# Patient Record
Sex: MALE | Race: WHITE | NOT HISPANIC OR LATINO
[De-identification: names, ages, dates, MRNs, and addresses within clinical notes are randomized per-mention and may not be internally consistent; named-entity substitution may affect disease eponyms.]

---

## 2017-01-03 ENCOUNTER — RX RENEWAL (OUTPATIENT)
Age: 52
End: 2017-01-03

## 2017-01-09 ENCOUNTER — APPOINTMENT (OUTPATIENT)
Dept: FAMILY MEDICINE | Facility: CLINIC | Age: 52
End: 2017-01-09

## 2017-01-09 DIAGNOSIS — Z87.898 PERSONAL HISTORY OF OTHER SPECIFIED CONDITIONS: ICD-10-CM

## 2017-01-09 DIAGNOSIS — Z87.828 PERSONAL HISTORY OF OTHER (HEALED) PHYSICAL INJURY AND TRAUMA: ICD-10-CM

## 2017-01-18 VITALS — SYSTOLIC BLOOD PRESSURE: 130 MMHG | HEART RATE: 72 BPM | DIASTOLIC BLOOD PRESSURE: 72 MMHG

## 2017-03-07 ENCOUNTER — RX RENEWAL (OUTPATIENT)
Age: 52
End: 2017-03-07

## 2017-03-22 ENCOUNTER — RX RENEWAL (OUTPATIENT)
Age: 52
End: 2017-03-22

## 2017-03-31 ENCOUNTER — RX RENEWAL (OUTPATIENT)
Age: 52
End: 2017-03-31

## 2017-04-02 ENCOUNTER — RX RENEWAL (OUTPATIENT)
Age: 52
End: 2017-04-02

## 2017-06-21 ENCOUNTER — RX RENEWAL (OUTPATIENT)
Age: 52
End: 2017-06-21

## 2017-06-22 ENCOUNTER — RX RENEWAL (OUTPATIENT)
Age: 52
End: 2017-06-22

## 2017-07-25 ENCOUNTER — APPOINTMENT (OUTPATIENT)
Dept: FAMILY MEDICINE | Facility: CLINIC | Age: 52
End: 2017-07-25
Payer: MEDICARE

## 2017-07-25 VITALS
HEART RATE: 82 BPM | OXYGEN SATURATION: 96 % | BODY MASS INDEX: 27.77 KG/M2 | DIASTOLIC BLOOD PRESSURE: 70 MMHG | SYSTOLIC BLOOD PRESSURE: 120 MMHG | WEIGHT: 205 LBS | HEIGHT: 72 IN

## 2017-07-25 PROCEDURE — 36415 COLL VENOUS BLD VENIPUNCTURE: CPT

## 2017-07-25 PROCEDURE — 99214 OFFICE O/P EST MOD 30 MIN: CPT | Mod: 25

## 2017-07-25 RX ORDER — QUETIAPINE FUMARATE 25 MG/1
25 TABLET ORAL
Qty: 1 | Refills: 1 | Status: DISCONTINUED | COMMUNITY
Start: 2017-06-22 | End: 2017-07-25

## 2017-07-28 LAB
25(OH)D3 SERPL-MCNC: 25.8 NG/ML
ALBUMIN SERPL ELPH-MCNC: 4.6 G/DL
ALP BLD-CCNC: 67 U/L
ALT SERPL-CCNC: 16 U/L
ANION GAP SERPL CALC-SCNC: 16 MMOL/L
AST SERPL-CCNC: 16 U/L
BASOPHILS # BLD AUTO: 0.04 K/UL
BASOPHILS NFR BLD AUTO: 0.8 %
BILIRUB SERPL-MCNC: 0.2 MG/DL
BUN SERPL-MCNC: 14 MG/DL
CALCIUM SERPL-MCNC: 9.8 MG/DL
CHLORIDE SERPL-SCNC: 105 MMOL/L
CHOLEST SERPL-MCNC: 167 MG/DL
CHOLEST/HDLC SERPL: 4.4 RATIO
CO2 SERPL-SCNC: 25 MMOL/L
CREAT SERPL-MCNC: 0.8 MG/DL
EOSINOPHIL # BLD AUTO: 0.07 K/UL
EOSINOPHIL NFR BLD AUTO: 1.5 %
GLUCOSE SERPL-MCNC: 99 MG/DL
HBA1C MFR BLD HPLC: 5.4 %
HCT VFR BLD CALC: 45.6 %
HDLC SERPL-MCNC: 38 MG/DL
HGB BLD-MCNC: 14.7 G/DL
IMM GRANULOCYTES NFR BLD AUTO: 0 %
LDLC SERPL CALC-MCNC: 55 MG/DL
LYMPHOCYTES # BLD AUTO: 1.42 K/UL
LYMPHOCYTES NFR BLD AUTO: 30.1 %
MAN DIFF?: NORMAL
MCHC RBC-ENTMCNC: 28.7 PG
MCHC RBC-ENTMCNC: 32.2 GM/DL
MCV RBC AUTO: 89.1 FL
MONOCYTES # BLD AUTO: 0.45 K/UL
MONOCYTES NFR BLD AUTO: 9.6 %
NEUTROPHILS # BLD AUTO: 2.73 K/UL
NEUTROPHILS NFR BLD AUTO: 58 %
PLATELET # BLD AUTO: 205 K/UL
POTASSIUM SERPL-SCNC: 4.7 MMOL/L
PROT SERPL-MCNC: 6.6 G/DL
PSA SERPL-MCNC: 0.57 NG/ML
RBC # BLD: 5.12 M/UL
RBC # FLD: 14.5 %
SODIUM SERPL-SCNC: 146 MMOL/L
TRIGL SERPL-MCNC: 372 MG/DL
TSH SERPL-ACNC: 1.68 UIU/ML
WBC # FLD AUTO: 4.71 K/UL

## 2017-07-31 ENCOUNTER — RX RENEWAL (OUTPATIENT)
Age: 52
End: 2017-07-31

## 2017-11-21 ENCOUNTER — RX RENEWAL (OUTPATIENT)
Age: 52
End: 2017-11-21

## 2018-01-10 ENCOUNTER — RX RENEWAL (OUTPATIENT)
Age: 53
End: 2018-01-10

## 2018-02-13 ENCOUNTER — RX RENEWAL (OUTPATIENT)
Age: 53
End: 2018-02-13

## 2018-03-07 ENCOUNTER — APPOINTMENT (OUTPATIENT)
Dept: FAMILY MEDICINE | Facility: CLINIC | Age: 53
End: 2018-03-07

## 2018-03-12 ENCOUNTER — APPOINTMENT (OUTPATIENT)
Dept: FAMILY MEDICINE | Facility: CLINIC | Age: 53
End: 2018-03-12

## 2018-03-15 ENCOUNTER — APPOINTMENT (OUTPATIENT)
Dept: FAMILY MEDICINE | Facility: CLINIC | Age: 53
End: 2018-03-15

## 2018-06-18 ENCOUNTER — RX RENEWAL (OUTPATIENT)
Age: 53
End: 2018-06-18

## 2018-07-12 ENCOUNTER — RX RENEWAL (OUTPATIENT)
Age: 53
End: 2018-07-12

## 2018-08-02 ENCOUNTER — RX RENEWAL (OUTPATIENT)
Age: 53
End: 2018-08-02

## 2018-08-02 ENCOUNTER — MEDICATION RENEWAL (OUTPATIENT)
Age: 53
End: 2018-08-02

## 2018-08-07 ENCOUNTER — RX RENEWAL (OUTPATIENT)
Age: 53
End: 2018-08-07

## 2018-10-27 ENCOUNTER — RX RENEWAL (OUTPATIENT)
Age: 53
End: 2018-10-27

## 2018-11-02 ENCOUNTER — RX RENEWAL (OUTPATIENT)
Age: 53
End: 2018-11-02

## 2018-11-13 ENCOUNTER — RX RENEWAL (OUTPATIENT)
Age: 53
End: 2018-11-13

## 2018-12-05 ENCOUNTER — RX RENEWAL (OUTPATIENT)
Age: 53
End: 2018-12-05

## 2018-12-06 ENCOUNTER — APPOINTMENT (OUTPATIENT)
Dept: FAMILY MEDICINE | Facility: CLINIC | Age: 53
End: 2018-12-06
Payer: MEDICARE

## 2018-12-06 VITALS
HEIGHT: 72 IN | SYSTOLIC BLOOD PRESSURE: 130 MMHG | WEIGHT: 200 LBS | HEART RATE: 80 BPM | DIASTOLIC BLOOD PRESSURE: 86 MMHG | BODY MASS INDEX: 27.09 KG/M2

## 2018-12-06 DIAGNOSIS — Z23 ENCOUNTER FOR IMMUNIZATION: ICD-10-CM

## 2018-12-06 PROCEDURE — G0439: CPT

## 2018-12-06 PROCEDURE — 99213 OFFICE O/P EST LOW 20 MIN: CPT | Mod: 25

## 2018-12-06 PROCEDURE — 36415 COLL VENOUS BLD VENIPUNCTURE: CPT

## 2018-12-06 PROCEDURE — 90662 IIV NO PRSV INCREASED AG IM: CPT

## 2018-12-06 PROCEDURE — G0008: CPT

## 2018-12-08 NOTE — PHYSICAL EXAM
[General Appearance - Alert] : alert [General Appearance - In No Acute Distress] : in no acute distress [Sclera] : the sclera and conjunctiva were normal [Respiration, Rhythm And Depth] : normal respiratory rhythm and effort [Auscultation Breath Sounds / Voice Sounds] : lungs were clear to auscultation bilaterally [Heart Rate And Rhythm] : heart rate was normal and rhythm regular [Heart Sounds] : normal S1 and S2 [Abdomen Soft] : soft [Abdomen Tenderness] : non-tender [Supraclavicular Lymph Nodes Enlarged Bilaterally] : supraclavicular [No Spinal Tenderness] : no spinal tenderness [Involuntary Movements] : no involuntary movements were seen [Skin Turgor] : normal skin turgor [Oriented To Time, Place, And Person] : oriented to person, place, and time [FreeTextEntry1] : LE paraplegia  wearing braces

## 2018-12-08 NOTE — REVIEW OF SYSTEMS
[see HPI] : see HPI [Negative] : Gastrointestinal [Fever] : no fever [Chills] : no chills [Constipation] : no constipation [Diarrhea] : no diarrhea
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
## 2018-12-08 NOTE — HEALTH RISK ASSESSMENT
[0] : 1) Little interest or pleasure doing things: Not at all (0) [1] : 2) Feeling down, depressed, or hopeless for several days (1) [de-identified] : Denies [de-identified] : as in HPI  [de-identified] : Deejay [de-identified] : Denies [de-identified] : DENIES [[id number]] : 1
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
## 2018-12-10 ENCOUNTER — EMERGENCY (EMERGENCY)
Facility: HOSPITAL | Age: 53
LOS: 1 days | Discharge: HOME | End: 2018-12-10
Attending: EMERGENCY MEDICINE
Payer: MEDICARE

## 2018-12-10 VITALS
HEIGHT: 72 IN | HEART RATE: 68 BPM | SYSTOLIC BLOOD PRESSURE: 114 MMHG | DIASTOLIC BLOOD PRESSURE: 75 MMHG | TEMPERATURE: 97 F | WEIGHT: 199.96 LBS | RESPIRATION RATE: 17 BRPM | OXYGEN SATURATION: 97 %

## 2018-12-10 LAB
ALBUMIN SERPL ELPH-MCNC: 4 G/DL — SIGNIFICANT CHANGE UP (ref 3.3–5.2)
ALP SERPL-CCNC: 66 U/L — SIGNIFICANT CHANGE UP (ref 40–120)
ALT FLD-CCNC: 17 U/L — SIGNIFICANT CHANGE UP
AMPHET UR-MCNC: NEGATIVE — SIGNIFICANT CHANGE UP
ANION GAP SERPL CALC-SCNC: 13 MMOL/L — SIGNIFICANT CHANGE UP (ref 5–17)
APAP SERPL-MCNC: <7.5 UG/ML — LOW (ref 10–26)
APPEARANCE UR: CLEAR — SIGNIFICANT CHANGE UP
AST SERPL-CCNC: 17 U/L — SIGNIFICANT CHANGE UP
BACTERIA # UR AUTO: ABNORMAL
BARBITURATES UR SCN-MCNC: NEGATIVE — SIGNIFICANT CHANGE UP
BASOPHILS # BLD AUTO: 0 K/UL — SIGNIFICANT CHANGE UP (ref 0–0.2)
BASOPHILS NFR BLD AUTO: 0.4 % — SIGNIFICANT CHANGE UP (ref 0–2)
BENZODIAZ UR-MCNC: POSITIVE
BILIRUB SERPL-MCNC: 0.4 MG/DL — SIGNIFICANT CHANGE UP (ref 0.4–2)
BILIRUB UR-MCNC: NEGATIVE — SIGNIFICANT CHANGE UP
BUN SERPL-MCNC: 17 MG/DL — SIGNIFICANT CHANGE UP (ref 8–20)
CALCIUM SERPL-MCNC: 9.3 MG/DL — SIGNIFICANT CHANGE UP (ref 8.6–10.2)
CHLORIDE SERPL-SCNC: 103 MMOL/L — SIGNIFICANT CHANGE UP (ref 98–107)
CO2 SERPL-SCNC: 27 MMOL/L — SIGNIFICANT CHANGE UP (ref 22–29)
COCAINE METAB.OTHER UR-MCNC: NEGATIVE — SIGNIFICANT CHANGE UP
COLOR SPEC: YELLOW — SIGNIFICANT CHANGE UP
CREAT SERPL-MCNC: 0.71 MG/DL — SIGNIFICANT CHANGE UP (ref 0.5–1.3)
DIFF PNL FLD: ABNORMAL
EOSINOPHIL # BLD AUTO: 0.1 K/UL — SIGNIFICANT CHANGE UP (ref 0–0.5)
EOSINOPHIL NFR BLD AUTO: 1.2 % — SIGNIFICANT CHANGE UP (ref 0–5)
EPI CELLS # UR: SIGNIFICANT CHANGE UP
ETHANOL SERPL-MCNC: <10 MG/DL — SIGNIFICANT CHANGE UP
GLUCOSE SERPL-MCNC: 88 MG/DL — SIGNIFICANT CHANGE UP (ref 70–115)
GLUCOSE UR QL: NEGATIVE MG/DL — SIGNIFICANT CHANGE UP
HCT VFR BLD CALC: 42.1 % — SIGNIFICANT CHANGE UP (ref 42–52)
HGB BLD-MCNC: 13.7 G/DL — LOW (ref 14–18)
KETONES UR-MCNC: ABNORMAL
LEUKOCYTE ESTERASE UR-ACNC: ABNORMAL
LYMPHOCYTES # BLD AUTO: 1 K/UL — SIGNIFICANT CHANGE UP (ref 1–4.8)
LYMPHOCYTES # BLD AUTO: 11.6 % — LOW (ref 20–55)
MCHC RBC-ENTMCNC: 28.8 PG — SIGNIFICANT CHANGE UP (ref 27–31)
MCHC RBC-ENTMCNC: 32.5 G/DL — SIGNIFICANT CHANGE UP (ref 32–36)
MCV RBC AUTO: 88.6 FL — SIGNIFICANT CHANGE UP (ref 80–94)
METHADONE UR-MCNC: NEGATIVE — SIGNIFICANT CHANGE UP
MONOCYTES # BLD AUTO: 1.3 K/UL — HIGH (ref 0–0.8)
MONOCYTES NFR BLD AUTO: 15.7 % — HIGH (ref 3–10)
NEUTROPHILS # BLD AUTO: 5.8 K/UL — SIGNIFICANT CHANGE UP (ref 1.8–8)
NEUTROPHILS NFR BLD AUTO: 71 % — SIGNIFICANT CHANGE UP (ref 37–73)
NITRITE UR-MCNC: NEGATIVE — SIGNIFICANT CHANGE UP
OPIATES UR-MCNC: NEGATIVE — SIGNIFICANT CHANGE UP
PCP SPEC-MCNC: SIGNIFICANT CHANGE UP
PCP UR-MCNC: NEGATIVE — SIGNIFICANT CHANGE UP
PH UR: 6.5 — SIGNIFICANT CHANGE UP (ref 5–8)
PLATELET # BLD AUTO: 168 K/UL — SIGNIFICANT CHANGE UP (ref 150–400)
POTASSIUM SERPL-MCNC: 4 MMOL/L — SIGNIFICANT CHANGE UP (ref 3.5–5.3)
POTASSIUM SERPL-SCNC: 4 MMOL/L — SIGNIFICANT CHANGE UP (ref 3.5–5.3)
PROT SERPL-MCNC: 6.5 G/DL — LOW (ref 6.6–8.7)
PROT UR-MCNC: 100 MG/DL
RBC # BLD: 4.75 M/UL — SIGNIFICANT CHANGE UP (ref 4.6–6.2)
RBC # FLD: 14.4 % — SIGNIFICANT CHANGE UP (ref 11–15.6)
RBC CASTS # UR COMP ASSIST: ABNORMAL /HPF (ref 0–4)
SALICYLATES SERPL-MCNC: <0.6 MG/DL — LOW (ref 10–20)
SODIUM SERPL-SCNC: 143 MMOL/L — SIGNIFICANT CHANGE UP (ref 135–145)
SP GR SPEC: 1.02 — SIGNIFICANT CHANGE UP (ref 1.01–1.02)
THC UR QL: NEGATIVE — SIGNIFICANT CHANGE UP
TSH SERPL-MCNC: 1.55 UIU/ML — SIGNIFICANT CHANGE UP (ref 0.27–4.2)
UROBILINOGEN FLD QL: 1 MG/DL
WBC # BLD: 8.2 K/UL — SIGNIFICANT CHANGE UP (ref 4.8–10.8)
WBC # FLD AUTO: 8.2 K/UL — SIGNIFICANT CHANGE UP (ref 4.8–10.8)
WBC UR QL: SIGNIFICANT CHANGE UP

## 2018-12-10 PROCEDURE — 70450 CT HEAD/BRAIN W/O DYE: CPT | Mod: 26

## 2018-12-10 PROCEDURE — 71045 X-RAY EXAM CHEST 1 VIEW: CPT | Mod: 26

## 2018-12-10 PROCEDURE — 99285 EMERGENCY DEPT VISIT HI MDM: CPT

## 2018-12-10 PROCEDURE — 93010 ELECTROCARDIOGRAM REPORT: CPT

## 2018-12-10 RX ORDER — NALOXONE HYDROCHLORIDE 4 MG/.1ML
0.4 SPRAY NASAL ONCE
Refills: 0 | Status: COMPLETED | OUTPATIENT
Start: 2018-12-10 | End: 2018-12-10

## 2018-12-10 RX ADMIN — NALOXONE HYDROCHLORIDE 0.4 MILLIGRAM(S): 4 SPRAY NASAL at 15:47

## 2018-12-10 RX ADMIN — NALOXONE HYDROCHLORIDE 0.4 MILLIGRAM(S): 4 SPRAY NASAL at 14:55

## 2018-12-10 NOTE — ED ADULT NURSE NOTE - NSIMPLEMENTINTERV_GEN_ALL_ED
Implemented All Fall with Harm Risk Interventions:  [place] to call system. Call bell, personal items and telephone within reach. Instruct patient to call for assistance. Room bathroom lighting operational. Non-slip footwear when patient is off stretcher. Physically safe environment: no spills, clutter or unnecessary equipment. Stretcher in lowest position, wheels locked, appropriate side rails in place. Provide visual cue, wrist band, yellow gown, etc. Monitor gait and stability. Monitor for mental status changes and reorient to person, place, and time. Review medications for side effects contributing to fall risk. Reinforce activity limits and safety measures with patient and family. Provide visual clues: red socks.

## 2018-12-10 NOTE — ED PROVIDER NOTE - PROGRESS NOTE DETAILS
Cora: pt is considerably more alert. Pt able to provide Hx- stating he took 2 benzo pills and nothing else. Denies feeling depressed or that it was a suicide attempt. Pt still somewhat somnolent and awaiting further sobriety MONIKA: I evaluated the patient. He is comfortable and in no distress. At his mental status baseline, awake, alert, no slurred speech. Ambulette coming to get him at noon.
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
## 2018-12-10 NOTE — ED ADULT NURSE REASSESSMENT NOTE - NS ED NURSE REASSESS COMMENT FT1
Pt. received at 1930. resting in stretcher in yellow gown in cardiac monitor. VSS. oriented to person, place, and time. Requesting food, respirations even and unlabored. informed of plan of care. will continue to monitor and maintain safety.
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
## 2018-12-10 NOTE — ED PROVIDER NOTE - NEUROLOGICAL, MLM
moves all extremities equally, localizes painful stimuli, opens eyes to painful stimuli, incomprehensible sounds
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
## 2018-12-11 VITALS
SYSTOLIC BLOOD PRESSURE: 119 MMHG | OXYGEN SATURATION: 97 % | HEART RATE: 99 BPM | RESPIRATION RATE: 18 BRPM | DIASTOLIC BLOOD PRESSURE: 83 MMHG | TEMPERATURE: 98 F

## 2018-12-11 NOTE — CHART NOTE - NSCHARTNOTEFT_GEN_A_CORE
SW Note - per Dr Sigala called the home AID listed in chart for pt Tesfaye Vargas [phone] and AID states she is not in a position to be able to return to the hospital tonight. She was told earlier "pt will be kept overnight for observation" Ms Vargas states she is able to come in the AM and transport pt home but in unable to transport pt tonight. Communicated same to Dr Sigala.
Spoke to Oscar from [facility] agency. Pt's transport is not available until noon.  will call back and may need details as to why patient came to ER. Relayed to agency, release form must be signed to further discuss patients status.

## 2018-12-11 NOTE — PROVIDER CONTACT NOTE (OTHER) - ASSESSMENT
CM determined that pt has 12h/7d of aide services through [facility] and [facility].  Aide in place for 12:30.  [facility] arranged for 12:30 p/u. Floor RN, MD, Pt, and ANGELICAO aware of same

## 2018-12-11 NOTE — ED ADULT NURSE REASSESSMENT NOTE - NS ED NURSE REASSESS COMMENT FT1
Patient recd this carolyne, ALISE/Yvonne3, VSS, waiting to go back to group home, call put out to group home.

## 2018-12-11 NOTE — ED ADULT NURSE REASSESSMENT NOTE - NS ED NURSE REASSESS COMMENT FT1
Pt. sleeping but awakens to voice. offers no complaints at this time. no aide at group home for patient. will arrange for transport in AM. pt. informed of plan of care. no further questions at this time. will continue to monitor and maintain safety.

## 2018-12-13 LAB
ALBUMIN SERPL ELPH-MCNC: 4.5 G/DL
ALP BLD-CCNC: 83 U/L
ALT SERPL-CCNC: 35 U/L
ANION GAP SERPL CALC-SCNC: 14 MMOL/L
AST SERPL-CCNC: 11 U/L
BASOPHILS # BLD AUTO: 0.07 K/UL
BASOPHILS NFR BLD AUTO: 1 %
BILIRUB SERPL-MCNC: 0.2 MG/DL
BUN SERPL-MCNC: 17 MG/DL
CALCIUM SERPL-MCNC: 9.4 MG/DL
CHLORIDE SERPL-SCNC: 109 MMOL/L
CHOLEST SERPL-MCNC: 153 MG/DL
CHOLEST/HDLC SERPL: 3.6 RATIO
CO2 SERPL-SCNC: 23 MMOL/L
CREAT SERPL-MCNC: 0.71 MG/DL
EOSINOPHIL # BLD AUTO: 0.1 K/UL
EOSINOPHIL NFR BLD AUTO: 1.4 %
GLUCOSE SERPL-MCNC: 108 MG/DL
HBA1C MFR BLD HPLC: 5.6 %
HCT VFR BLD CALC: 45.6 %
HDLC SERPL-MCNC: 43 MG/DL
HGB BLD-MCNC: 15 G/DL
IMM GRANULOCYTES NFR BLD AUTO: 0.1 %
LDLC SERPL CALC-MCNC: NORMAL
LYMPHOCYTES # BLD AUTO: 1.29 K/UL
LYMPHOCYTES NFR BLD AUTO: 18 %
MAN DIFF?: NORMAL
MCHC RBC-ENTMCNC: 29.5 PG
MCHC RBC-ENTMCNC: 32.9 GM/DL
MCV RBC AUTO: 89.6 FL
MONOCYTES # BLD AUTO: 0.54 K/UL
MONOCYTES NFR BLD AUTO: 7.6 %
NEUTROPHILS # BLD AUTO: 5.14 K/UL
NEUTROPHILS NFR BLD AUTO: 71.9 %
PLATELET # BLD AUTO: 190 K/UL
POTASSIUM SERPL-SCNC: 4 MMOL/L
PROT SERPL-MCNC: 6.2 G/DL
PSA SERPL-MCNC: 0.7 NG/ML
RBC # BLD: 5.09 M/UL
RBC # FLD: 15 %
SODIUM SERPL-SCNC: 146 MMOL/L
TRIGL SERPL-MCNC: 562 MG/DL
WBC # FLD AUTO: 7.15 K/UL

## 2018-12-18 LAB — HEMOCCULT STL QL IA: NEGATIVE

## 2018-12-31 ENCOUNTER — RX RENEWAL (OUTPATIENT)
Age: 53
End: 2018-12-31

## 2019-01-27 ENCOUNTER — RX RENEWAL (OUTPATIENT)
Age: 54
End: 2019-01-27

## 2019-01-30 ENCOUNTER — RX RENEWAL (OUTPATIENT)
Age: 54
End: 2019-01-30

## 2019-02-15 ENCOUNTER — OTHER (OUTPATIENT)
Age: 54
End: 2019-02-15

## 2019-02-19 ENCOUNTER — APPOINTMENT (OUTPATIENT)
Dept: ORTHOPEDIC SURGERY | Facility: CLINIC | Age: 54
End: 2019-02-19

## 2019-02-26 ENCOUNTER — RX RENEWAL (OUTPATIENT)
Age: 54
End: 2019-02-26

## 2019-02-26 ENCOUNTER — APPOINTMENT (OUTPATIENT)
Dept: ORTHOPEDIC SURGERY | Facility: CLINIC | Age: 54
End: 2019-02-26
Payer: MEDICARE

## 2019-02-26 VITALS
DIASTOLIC BLOOD PRESSURE: 78 MMHG | HEIGHT: 72 IN | HEART RATE: 76 BPM | WEIGHT: 200 LBS | BODY MASS INDEX: 27.09 KG/M2 | SYSTOLIC BLOOD PRESSURE: 132 MMHG

## 2019-02-26 DIAGNOSIS — T14.8XXA OTHER INJURY OF UNSPECIFIED BODY REGION, INITIAL ENCOUNTER: ICD-10-CM

## 2019-02-26 DIAGNOSIS — L08.9 OTHER INJURY OF UNSPECIFIED BODY REGION, INITIAL ENCOUNTER: ICD-10-CM

## 2019-02-26 DIAGNOSIS — Z87.2 PERSONAL HISTORY OF DISEASES OF THE SKIN AND SUBCUTANEOUS TISSUE: ICD-10-CM

## 2019-02-26 PROCEDURE — 99205 OFFICE O/P NEW HI 60 MIN: CPT

## 2019-02-26 PROCEDURE — 72100 X-RAY EXAM L-S SPINE 2/3 VWS: CPT
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
## 2019-02-26 NOTE — DISCUSSION/SUMMARY
[de-identified] : Pt is already under the care of Dr. Les London. As seeing Dr. London is a physiatrist, I would recommend following up with Dr. London for further evaluation. If there is some surgical focus vs back pain, I would be more than happy to see this patient back for a surgical discussion. I believe this patient is a complex spine patient at this point in time given his history of paraplegia wound problems etc. patient is complaining of chronic back pain and I do not think operative management is in this patient's best interest at this point in time the above-mentioned physiatry he will be contacted with regard to this patient with regard to optimal management of his back pain. I would recommend epidural type injections less pain management techniques.

## 2019-02-26 NOTE — ADDENDUM
[FreeTextEntry1] : Documented by Kaz Onofre acting as a scribe for Dr. Raffy Arreguin on 02/26/2019 . All medical record entries made by the Scribe were at my, Dr. Raffy Arreguin, direction and personally dictated by me on 02/26/2019 . I have reviewed the chart and agree that the record accurately reflects my personal performance of the history, physical exam, assessment and plan. I have also personally directed, reviewed, and agreed with the chart.

## 2019-02-26 NOTE — PHYSICAL EXAM
[Poor Appearance] : well-appearing [Acute Distress] : not in acute distress [de-identified] : The patient appears well nourished and in no apparent distress. The patient is alert and oriented to person, place, and time. Affect and mood appear normal. The head is normocephalic and atraumatic. The eyes reveal normal sclera and extra ocular muscles are intact. The tongue is midline with no apparent lesions. Skin shows normal turgor with no evidence of eczema or psoriasis. No respiratory distress noted. \par \par seated in a motorized wheelchair. he has b/l AFO's in place. he is paralyzed essentially from waist down. some UE diminished motor tone. [de-identified] : X-ray of the lumbar spine taken today shows lumbar spondylosis.

## 2019-02-28 ENCOUNTER — RX RENEWAL (OUTPATIENT)
Age: 54
End: 2019-02-28

## 2019-04-23 ENCOUNTER — RX RENEWAL (OUTPATIENT)
Age: 54
End: 2019-04-23

## 2019-04-30 ENCOUNTER — RX RENEWAL (OUTPATIENT)
Age: 54
End: 2019-04-30

## 2019-05-14 ENCOUNTER — RX RENEWAL (OUTPATIENT)
Age: 54
End: 2019-05-14

## 2019-05-25 ENCOUNTER — RX RENEWAL (OUTPATIENT)
Age: 54
End: 2019-05-25

## 2019-07-02 ENCOUNTER — RX RENEWAL (OUTPATIENT)
Age: 54
End: 2019-07-02

## 2019-08-26 ENCOUNTER — RX RENEWAL (OUTPATIENT)
Age: 54
End: 2019-08-26

## 2019-09-24 ENCOUNTER — RX RENEWAL (OUTPATIENT)
Age: 54
End: 2019-09-24

## 2019-10-01 ENCOUNTER — RX RENEWAL (OUTPATIENT)
Age: 54
End: 2019-10-01

## 2019-10-24 ENCOUNTER — RX RENEWAL (OUTPATIENT)
Age: 54
End: 2019-10-24

## 2020-01-02 ENCOUNTER — RX RENEWAL (OUTPATIENT)
Age: 55
End: 2020-01-02

## 2020-01-24 ENCOUNTER — RX RENEWAL (OUTPATIENT)
Age: 55
End: 2020-01-24

## 2020-02-06 ENCOUNTER — RX RENEWAL (OUTPATIENT)
Age: 55
End: 2020-02-06

## 2020-02-13 ENCOUNTER — RX RENEWAL (OUTPATIENT)
Age: 55
End: 2020-02-13

## 2020-02-28 ENCOUNTER — RX RENEWAL (OUTPATIENT)
Age: 55
End: 2020-02-28

## 2020-03-27 ENCOUNTER — RX RENEWAL (OUTPATIENT)
Age: 55
End: 2020-03-27

## 2020-04-23 ENCOUNTER — RX RENEWAL (OUTPATIENT)
Age: 55
End: 2020-04-23

## 2020-06-04 ENCOUNTER — RX RENEWAL (OUTPATIENT)
Age: 55
End: 2020-06-04

## 2020-06-08 ENCOUNTER — APPOINTMENT (OUTPATIENT)
Dept: PULMONOLOGY | Facility: CLINIC | Age: 55
End: 2020-06-08
Payer: MEDICARE

## 2020-06-08 VITALS — BODY MASS INDEX: 26.45 KG/M2 | WEIGHT: 195 LBS

## 2020-06-08 PROCEDURE — 99214 OFFICE O/P EST MOD 30 MIN: CPT

## 2020-06-08 RX ORDER — METHYLPREDNISOLONE 4 MG/1
4 TABLET ORAL
Qty: 21 | Refills: 0 | Status: DISCONTINUED | COMMUNITY
Start: 2020-02-06 | End: 2020-06-08

## 2020-06-08 RX ORDER — ACETAMINOPHEN 500 MG/1
500 TABLET ORAL
Qty: 100 | Refills: 4 | Status: DISCONTINUED | COMMUNITY
Start: 2017-04-02 | End: 2020-06-08

## 2020-06-10 NOTE — PHYSICAL EXAM
[No Acute Distress] : no acute distress [Normal Oropharynx] : normal oropharynx [Normal Appearance] : normal appearance [No Neck Mass] : no neck mass [Normal Rate/Rhythm] : normal rate/rhythm [Normal S1, S2] : normal s1, s2 [No Murmurs] : no murmurs [No Resp Distress] : no resp distress [Clear to Auscultation Bilaterally] : clear to auscultation bilaterally [No Abnormalities] : no abnormalities [Benign] : benign [Normal Gait] : normal gait [No Clubbing] : no clubbing [No Cyanosis] : no cyanosis [No Edema] : no edema [FROM] : FROM [Normal Color/ Pigmentation] : normal color/ pigmentation [Oriented x3] : oriented x3 [Normal Affect] : normal affect [TextBox_2] : confined to a wheelchair [TextBox_132] : Bilateral lower extremity paralysis
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
## 2020-06-10 NOTE — DISCUSSION/SUMMARY
[FreeTextEntry1] : 54-year-old male, seen today for reevaluation of his sleep apnea. Patient states that his CPAP machine is only 2 years old and therefore, will require a repair attempts before placement. Previous studies from [facility]. Will be obtained.

## 2020-06-15 ENCOUNTER — RX RENEWAL (OUTPATIENT)
Age: 55
End: 2020-06-15

## 2020-06-22 ENCOUNTER — RX RENEWAL (OUTPATIENT)
Age: 55
End: 2020-06-22

## 2020-07-09 DIAGNOSIS — Z01.818 ENCOUNTER FOR OTHER PREPROCEDURAL EXAMINATION: ICD-10-CM

## 2020-07-12 ENCOUNTER — APPOINTMENT (OUTPATIENT)
Dept: DISASTER EMERGENCY | Facility: CLINIC | Age: 55
End: 2020-07-12

## 2020-07-15 ENCOUNTER — APPOINTMENT (OUTPATIENT)
Dept: PULMONOLOGY | Facility: CLINIC | Age: 55
End: 2020-07-15
Payer: MEDICARE

## 2020-07-15 VITALS — HEART RATE: 81 BPM | OXYGEN SATURATION: 94 % | RESPIRATION RATE: 16 BRPM

## 2020-07-15 VITALS — WEIGHT: 215 LBS | HEIGHT: 72 IN | BODY MASS INDEX: 29.12 KG/M2

## 2020-07-15 PROCEDURE — 99213 OFFICE O/P EST LOW 20 MIN: CPT

## 2020-07-15 RX ORDER — VITAMIN B COMPLEX
TABLET ORAL
Qty: 100 | Refills: 4 | Status: COMPLETED | COMMUNITY
Start: 2017-03-07 | End: 2020-07-15

## 2020-07-15 NOTE — HISTORY OF PRESENT ILLNESS
[TextBox_4] : 55-year-old male, seen today status post ORIF for a new BiPAP machine. Patient did not receive his machine. He has no complaints of cough, wheeze, or sputum. He is complaining of excessive daytime somnolence

## 2020-07-15 NOTE — DISCUSSION/SUMMARY
[FreeTextEntry1] : 54-year-old male with a history of excessive daytime somnolence previously maintained on BiPAP seen today for expected review on new BiPAP. Patient presently has not received a machine and information was given to him regarding contact with his DME. Next followup will be the TeleHealth

## 2020-07-15 NOTE — PHYSICAL EXAM
[Normal Appearance] : normal appearance [No Acute Distress] : no acute distress [Normal Oropharynx] : normal oropharynx [Normal Rate/Rhythm] : normal rate/rhythm [Normal S1, S2] : normal s1, s2 [No Neck Mass] : no neck mass [Clear to Auscultation Bilaterally] : clear to auscultation bilaterally [No Murmurs] : no murmurs [No Resp Distress] : no resp distress [No Abnormalities] : no abnormalities [Benign] : benign [No Clubbing] : no clubbing [No Cyanosis] : no cyanosis [Normal Gait] : normal gait [No Edema] : no edema [Normal Color/ Pigmentation] : normal color/ pigmentation [FROM] : FROM [Oriented x3] : oriented x3 [Normal Affect] : normal affect [TextBox_2] : confined to a wheelchair [TextBox_132] : Bilateral lower extremity paralysis

## 2020-08-24 ENCOUNTER — APPOINTMENT (OUTPATIENT)
Dept: PULMONOLOGY | Facility: CLINIC | Age: 55
End: 2020-08-24
Payer: MEDICARE

## 2020-08-24 DIAGNOSIS — G47.9 SLEEP DISORDER, UNSPECIFIED: ICD-10-CM

## 2020-08-24 PROCEDURE — 99213 OFFICE O/P EST LOW 20 MIN: CPT | Mod: 95

## 2020-08-25 NOTE — HISTORY OF PRESENT ILLNESS
[Home] : at home, [unfilled] , at the time of the visit. [Medical Office: ([facility])___] : at the medical office located in  [Verbal consent obtained from patient] : the patient, [unfilled] [Obstructive Sleep Apnea] : obstructive sleep apnea [BPAP:] : BPAP [Lab] : lab [TextBox_4] : Telehealth via [facility] [TextBox_108] : 6.6 [TextBox_100] : 9/30/16 [TextBox_135] : 15/11 [TextBox_120] : split night study [TextBox_127] : 8/6/20 [TextBox_129] : 8/23/20 [TextBox_133] : 72 [TextBox_137] : 56 [TextBox_141] : 7 [TextBox_147] : 6.8 [TextBox_143] : 2 [TextBox_158] : [employer] [TextBox_160] : F38 [TextBox_165] : Did not receive heated hose which is affecting compliance \par  [TextBox_162] : 6/8/20

## 2020-08-25 NOTE — PHYSICAL EXAM
[No Acute Distress] : no acute distress [Well Nourished] : well nourished [Well Groomed] : well groomed [Normal Appearance] : normal appearance [No Deformities] : no deformities [Well Developed] : well developed [TextBox_2] : In wheelchair

## 2020-08-25 NOTE — DISCUSSION/SUMMARY
[FreeTextEntry1] : The patient's obstructive sleep apnea has always been mild and despite the use of BiPAP, which as been compromised by his equipment is appendectomy. Hypoxia. Next has not significantly changed. He remains just above normal. His complaints of unrefreshing sleep, and daytime somnolence may be secondary to traumatic brain injury, depression, as well as medication

## 2020-09-01 ENCOUNTER — EMERGENCY (EMERGENCY)
Facility: HOSPITAL | Age: 55
LOS: 1 days | Discharge: HOME | End: 2020-09-01
Attending: STUDENT IN AN ORGANIZED HEALTH CARE EDUCATION/TRAINING PROGRAM
Payer: MEDICARE

## 2020-09-01 VITALS
WEIGHT: 214.95 LBS | TEMPERATURE: 98 F | RESPIRATION RATE: 16 BRPM | HEART RATE: 98 BPM | DIASTOLIC BLOOD PRESSURE: 81 MMHG | SYSTOLIC BLOOD PRESSURE: 133 MMHG | HEIGHT: 72 IN | OXYGEN SATURATION: 97 %

## 2020-09-01 PROCEDURE — 99284 EMERGENCY DEPT VISIT MOD MDM: CPT

## 2020-09-01 PROCEDURE — 99283 EMERGENCY DEPT VISIT LOW MDM: CPT

## 2020-09-01 PROCEDURE — 73564 X-RAY EXAM KNEE 4 OR MORE: CPT

## 2020-09-01 PROCEDURE — 73564 X-RAY EXAM KNEE 4 OR MORE: CPT | Mod: 26,LT

## 2020-09-01 RX ORDER — OXYCODONE AND ACETAMINOPHEN 5; 325 MG/1; MG/1
1 TABLET ORAL ONCE
Refills: 0 | Status: DISCONTINUED | OUTPATIENT
Start: 2020-09-01 | End: 2020-09-01

## 2020-09-01 RX ORDER — IBUPROFEN 200 MG
600 TABLET ORAL ONCE
Refills: 0 | Status: COMPLETED | OUTPATIENT
Start: 2020-09-01 | End: 2020-09-01

## 2020-09-01 RX ADMIN — OXYCODONE AND ACETAMINOPHEN 1 TABLET(S): 5; 325 TABLET ORAL at 14:33

## 2020-09-01 RX ADMIN — Medication 600 MILLIGRAM(S): at 17:11

## 2020-09-01 NOTE — ED PROVIDER NOTE - PROGRESS NOTE DETAILS
advised on prelim results of xray and highly arthritic appearing knee. advised on RICE, and fu with orthopedic for continued pain

## 2020-09-01 NOTE — ED ADULT TRIAGE NOTE - CHIEF COMPLAINT QUOTE
Patient A&Ox4 complaining of pain to left knee secondary to slip & fall out of wheelchair last night. Stated took 1000mg Tylenol x4 hours ago. Presents with +4 pitting edema to bilat feet, chronic in nature.

## 2020-09-01 NOTE — ED PROVIDER NOTE - CLINICAL SUMMARY MEDICAL DECISION MAKING FREE TEXT BOX
male wheelchair bound with mechanical fall last evening transferring. c/o left knee pain. no apparent deformity. pms intact. xray pain control and re-eval

## 2020-09-01 NOTE — ED PROVIDER NOTE - ATTENDING CONTRIBUTION TO CARE
54 YOM pmh TBI wheelchair bound was transferring from chair to to bed, did not have LOC. Used his life alert device and assistance helped him back into his bed. Went to sleep and this morning upon waking was complaining of left knee pain which prompted him to come to ED. Does not take blood thinners. Did not hit head.   AP - will get xray knee r/o traumatic injury. offered pain control.

## 2020-09-01 NOTE — ED PROVIDER NOTE - PATIENT PORTAL LINK FT
You can access the FollowMyHealth Patient Portal offered by [facility] by registering at the following website: [email or web address]. By joining [employer]’s FollowMyHealth portal, you will also be able to view your health information using other applications (apps) compatible with our system.

## 2020-09-01 NOTE — ED PROVIDER NOTE - CARE PROVIDER_API CALL
David Yoder  ORTHOPAEDIC SURGERY  [address] [place] [address]  Phone: [phone]  Fax: [phone]  Follow Up Time: 7-10 Days

## 2020-09-01 NOTE — ED PROVIDER NOTE - CONSTITUTIONAL, MLM
normal... Well appearing, awake, alert, oriented to person, place, time/situation and in no apparent distress. NCAT

## 2020-09-01 NOTE — ED PROVIDER NOTE - OBJECTIVE STATEMENT
53 yo male hx of tbi wheelchair bound was transferring from chair to bed late last night and fell. denies head injury or loc no blood thinners. states that he used his life alert device for assistance. denies neck or back pain. denies chest pain sob. c/o left knee pain worse with movements took 1000mg tylenol this morning without relief of symptoms. denies numbness or tingling of the lower extremity denies new bladder or bowel incontinence 53 yo male hx of tbi wheelchair bound was transferring from chair to bed late last night and fell. denies head injury or loc no blood thinners. states that he used his life alert device for assistance. denies neck or back pain. denies chest pain sob. c/o left knee pain worse with movements took 1000mg tylenol this morning without relief of symptoms. denies numbness or tingling of the lower extremity denies new bladder or bowel incontinence.

## 2020-09-01 NOTE — ED PROVIDER NOTE - MUSCULOSKELETAL, MLM
Spine appears normal, no midline pt vertebral or step offs. bilaterally 4+ pedal edema bilaterally. 2+ dp pulses sensation intacvt bilaterally. bilateral calfs soft no calf tenderness. KNEE without noted deformity. lateral and medial patella tenderness. no effusion. full flexion and extension

## 2020-09-01 NOTE — CHART NOTE - NSCHARTNOTEFT_GEN_A_CORE
SOCIAL WORK NOTE:  MET WITH PATIENT AS PER MD NEEDS AMBULANCE BACK TO PRIVATE HOME.  PATIENT IS ALERT AND ORIENTED. CONFIRMED HE HAS KEYS TO ENTER HIS APARTMENT AND HIS AID WILL BE THERE UNTIL 6 PM.  PATIENT DOES NOT HAVE HIS WHEELCHAIR HERE WITH HIM AND IS NON AMBULATORY.  AMBULANCE ARRANGED FOR 3;30 PM PICKUP TO HOME.

## 2020-09-08 ENCOUNTER — RX RENEWAL (OUTPATIENT)
Age: 55
End: 2020-09-08

## 2020-09-18 ENCOUNTER — APPOINTMENT (OUTPATIENT)
Dept: FAMILY MEDICINE | Facility: CLINIC | Age: 55
End: 2020-09-18
Payer: MEDICARE

## 2020-09-18 ENCOUNTER — EMERGENCY (EMERGENCY)
Facility: HOSPITAL | Age: 55
LOS: 1 days | Discharge: HOME | End: 2020-09-18
Attending: EMERGENCY MEDICINE
Payer: MEDICARE

## 2020-09-18 VITALS
WEIGHT: 215 LBS | SYSTOLIC BLOOD PRESSURE: 110 MMHG | BODY MASS INDEX: 29.12 KG/M2 | HEIGHT: 72 IN | HEART RATE: 78 BPM | DIASTOLIC BLOOD PRESSURE: 78 MMHG

## 2020-09-18 VITALS
RESPIRATION RATE: 16 BRPM | DIASTOLIC BLOOD PRESSURE: 94 MMHG | HEART RATE: 73 BPM | WEIGHT: 214.95 LBS | TEMPERATURE: 98 F | HEIGHT: 72 IN | SYSTOLIC BLOOD PRESSURE: 144 MMHG | OXYGEN SATURATION: 96 %

## 2020-09-18 LAB
ALBUMIN SERPL ELPH-MCNC: 4.2 G/DL — SIGNIFICANT CHANGE UP (ref 3.3–5.2)
ALP SERPL-CCNC: 92 U/L — SIGNIFICANT CHANGE UP (ref 40–120)
ALT FLD-CCNC: 21 U/L — SIGNIFICANT CHANGE UP
ANION GAP SERPL CALC-SCNC: 11 MMOL/L — SIGNIFICANT CHANGE UP (ref 5–17)
APPEARANCE UR: CLEAR — SIGNIFICANT CHANGE UP
AST SERPL-CCNC: 31 U/L — SIGNIFICANT CHANGE UP
BASOPHILS # BLD AUTO: 0.08 K/UL — SIGNIFICANT CHANGE UP (ref 0–0.2)
BASOPHILS NFR BLD AUTO: 1 % — SIGNIFICANT CHANGE UP (ref 0–2)
BILIRUB SERPL-MCNC: 0.2 MG/DL — LOW (ref 0.4–2)
BILIRUB UR-MCNC: NEGATIVE — SIGNIFICANT CHANGE UP
BUN SERPL-MCNC: 17 MG/DL — SIGNIFICANT CHANGE UP (ref 8–20)
CALCIUM SERPL-MCNC: 9.8 MG/DL — SIGNIFICANT CHANGE UP (ref 8.6–10.2)
CHLORIDE SERPL-SCNC: 102 MMOL/L — SIGNIFICANT CHANGE UP (ref 98–107)
CO2 SERPL-SCNC: 26 MMOL/L — SIGNIFICANT CHANGE UP (ref 22–29)
COLOR SPEC: YELLOW — SIGNIFICANT CHANGE UP
CREAT SERPL-MCNC: 0.68 MG/DL — SIGNIFICANT CHANGE UP (ref 0.5–1.3)
DIFF PNL FLD: NEGATIVE — SIGNIFICANT CHANGE UP
EOSINOPHIL # BLD AUTO: 0.18 K/UL — SIGNIFICANT CHANGE UP (ref 0–0.5)
EOSINOPHIL NFR BLD AUTO: 2.3 % — SIGNIFICANT CHANGE UP (ref 0–6)
GLUCOSE SERPL-MCNC: 107 MG/DL — HIGH (ref 70–99)
GLUCOSE UR QL: NEGATIVE MG/DL — SIGNIFICANT CHANGE UP
HCT VFR BLD CALC: 47.2 % — SIGNIFICANT CHANGE UP (ref 39–50)
HGB BLD-MCNC: 15.3 G/DL — SIGNIFICANT CHANGE UP (ref 13–17)
IMM GRANULOCYTES NFR BLD AUTO: 0.4 % — SIGNIFICANT CHANGE UP (ref 0–1.5)
KETONES UR-MCNC: NEGATIVE — SIGNIFICANT CHANGE UP
LEUKOCYTE ESTERASE UR-ACNC: NEGATIVE — SIGNIFICANT CHANGE UP
LIDOCAIN IGE QN: 31 U/L — SIGNIFICANT CHANGE UP (ref 22–51)
LYMPHOCYTES # BLD AUTO: 1.6 K/UL — SIGNIFICANT CHANGE UP (ref 1–3.3)
LYMPHOCYTES # BLD AUTO: 20.6 % — SIGNIFICANT CHANGE UP (ref 13–44)
MAGNESIUM SERPL-MCNC: 2 MG/DL — SIGNIFICANT CHANGE UP (ref 1.6–2.6)
MCHC RBC-ENTMCNC: 29.5 PG — SIGNIFICANT CHANGE UP (ref 27–34)
MCHC RBC-ENTMCNC: 32.4 GM/DL — SIGNIFICANT CHANGE UP (ref 32–36)
MCV RBC AUTO: 91.1 FL — SIGNIFICANT CHANGE UP (ref 80–100)
MONOCYTES # BLD AUTO: 0.94 K/UL — HIGH (ref 0–0.9)
MONOCYTES NFR BLD AUTO: 12.1 % — SIGNIFICANT CHANGE UP (ref 2–14)
NEUTROPHILS # BLD AUTO: 4.92 K/UL — SIGNIFICANT CHANGE UP (ref 1.8–7.4)
NEUTROPHILS NFR BLD AUTO: 63.6 % — SIGNIFICANT CHANGE UP (ref 43–77)
NITRITE UR-MCNC: NEGATIVE — SIGNIFICANT CHANGE UP
NT-PROBNP SERPL-SCNC: 10 PG/ML — SIGNIFICANT CHANGE UP (ref 0–300)
PH UR: 7 — SIGNIFICANT CHANGE UP (ref 5–8)
PLATELET # BLD AUTO: 189 K/UL — SIGNIFICANT CHANGE UP (ref 150–400)
POTASSIUM SERPL-MCNC: 4.5 MMOL/L — SIGNIFICANT CHANGE UP (ref 3.5–5.3)
POTASSIUM SERPL-SCNC: 4.5 MMOL/L — SIGNIFICANT CHANGE UP (ref 3.5–5.3)
PROT SERPL-MCNC: 6.7 G/DL — SIGNIFICANT CHANGE UP (ref 6.6–8.7)
PROT UR-MCNC: 15 MG/DL
RBC # BLD: 5.18 M/UL — SIGNIFICANT CHANGE UP (ref 4.2–5.8)
RBC # FLD: 14.3 % — SIGNIFICANT CHANGE UP (ref 10.3–14.5)
SODIUM SERPL-SCNC: 139 MMOL/L — SIGNIFICANT CHANGE UP (ref 135–145)
SP GR SPEC: 1.01 — SIGNIFICANT CHANGE UP (ref 1.01–1.02)
TROPONIN T SERPL-MCNC: <0.01 NG/ML — SIGNIFICANT CHANGE UP (ref 0–0.06)
UROBILINOGEN FLD QL: NEGATIVE MG/DL — SIGNIFICANT CHANGE UP
WBC # BLD: 7.75 K/UL — SIGNIFICANT CHANGE UP (ref 3.8–10.5)
WBC # FLD AUTO: 7.75 K/UL — SIGNIFICANT CHANGE UP (ref 3.8–10.5)

## 2020-09-18 PROCEDURE — 73502 X-RAY EXAM HIP UNI 2-3 VIEWS: CPT | Mod: 26,LT

## 2020-09-18 PROCEDURE — 73590 X-RAY EXAM OF LOWER LEG: CPT

## 2020-09-18 PROCEDURE — 71045 X-RAY EXAM CHEST 1 VIEW: CPT | Mod: 26

## 2020-09-18 PROCEDURE — 80053 COMPREHEN METABOLIC PANEL: CPT

## 2020-09-18 PROCEDURE — 93970 EXTREMITY STUDY: CPT | Mod: 26

## 2020-09-18 PROCEDURE — 36415 COLL VENOUS BLD VENIPUNCTURE: CPT

## 2020-09-18 PROCEDURE — 73630 X-RAY EXAM OF FOOT: CPT

## 2020-09-18 PROCEDURE — 72170 X-RAY EXAM OF PELVIS: CPT

## 2020-09-18 PROCEDURE — 73552 X-RAY EXAM OF FEMUR 2/>: CPT

## 2020-09-18 PROCEDURE — 73552 X-RAY EXAM OF FEMUR 2/>: CPT | Mod: 26,LT

## 2020-09-18 PROCEDURE — 81001 URINALYSIS AUTO W/SCOPE: CPT

## 2020-09-18 PROCEDURE — 93005 ELECTROCARDIOGRAM TRACING: CPT

## 2020-09-18 PROCEDURE — 99213 OFFICE O/P EST LOW 20 MIN: CPT

## 2020-09-18 PROCEDURE — 93970 EXTREMITY STUDY: CPT

## 2020-09-18 PROCEDURE — 73562 X-RAY EXAM OF KNEE 3: CPT

## 2020-09-18 PROCEDURE — 71045 X-RAY EXAM CHEST 1 VIEW: CPT

## 2020-09-18 PROCEDURE — 85025 COMPLETE CBC W/AUTO DIFF WBC: CPT

## 2020-09-18 PROCEDURE — 84484 ASSAY OF TROPONIN QUANT: CPT

## 2020-09-18 PROCEDURE — 83690 ASSAY OF LIPASE: CPT

## 2020-09-18 PROCEDURE — 99284 EMERGENCY DEPT VISIT MOD MDM: CPT | Mod: 25

## 2020-09-18 PROCEDURE — 83880 ASSAY OF NATRIURETIC PEPTIDE: CPT

## 2020-09-18 PROCEDURE — 99285 EMERGENCY DEPT VISIT HI MDM: CPT

## 2020-09-18 PROCEDURE — 73590 X-RAY EXAM OF LOWER LEG: CPT | Mod: 26,LT

## 2020-09-18 PROCEDURE — 83735 ASSAY OF MAGNESIUM: CPT

## 2020-09-18 PROCEDURE — 73502 X-RAY EXAM HIP UNI 2-3 VIEWS: CPT

## 2020-09-18 PROCEDURE — 73630 X-RAY EXAM OF FOOT: CPT | Mod: 26,LT

## 2020-09-18 PROCEDURE — 73562 X-RAY EXAM OF KNEE 3: CPT | Mod: 26,LT

## 2020-09-18 PROCEDURE — 93010 ELECTROCARDIOGRAM REPORT: CPT

## 2020-09-18 PROCEDURE — 72170 X-RAY EXAM OF PELVIS: CPT | Mod: 26,59

## 2020-09-18 RX ORDER — SERTRALINE HYDROCHLORIDE 50 MG/1
50 TABLET, FILM COATED ORAL
Qty: 90 | Refills: 0 | Status: DISCONTINUED | COMMUNITY
Start: 2018-02-13 | End: 2020-09-18

## 2020-09-18 NOTE — CHART NOTE - NSCHARTNOTEFT_GEN_A_CORE
SWNote: p/c from pt's EDMD (Brenda) to inform worker that pt is medically ready for discharge and needs [facility] assistance . Worker to speak with pt to obtain info about house arrangements. SW to follow.

## 2020-09-18 NOTE — ED PROVIDER NOTE - OBJECTIVE STATEMENT
The patient is a 55 year old male presents with LLE swelling paraplegic fell 2 weeks ago and sent by PMD for possible DVT

## 2020-09-18 NOTE — ED PROVIDER NOTE - PROGRESS NOTE DETAILS
Plan to d/c patient. The patient appears well and US showing no DVT and x-ray shows no fx according to [facility] and will dc home and follow up with PMD

## 2020-09-18 NOTE — ED ADULT NURSE REASSESSMENT NOTE - NS ED NURSE REASSESS COMMENT FT1
assumed care of pt @ 1930, report received from [facility] JOYCE. pt AOx4 in NAD, Vital Signs Stable. pt presents with LLE swelling and warmth. +2 pitting edema noted to LLE. pt states that he fell out of his wheelchair last week and hit left knee. pt denies any discomfort. no redness noted to effected extremity. respirations even and unlabored, denies any SOB. pt denies chest discomfort. awaiting ultrasound at this time. XR completed, results pending. safety maintained.

## 2020-09-18 NOTE — ED PROVIDER NOTE - ATTENDING CONTRIBUTION TO CARE
The patient seen and examined    LLE shelley    I, Duncan Galvan, performed the initial face to face bedside interview with this patient regarding history of present illness, review of symptoms and relevant past medical, social and family history.  I completed an independent physical examination.  I was the initial provider who evaluated this patient. I have signed out the follow up of any pending tests (i.e. labs, radiological studies) to the resident  I have communicated the patient’s plan of care and disposition with the resident

## 2020-09-18 NOTE — ED ADULT NURSE REASSESSMENT NOTE - NS ED NURSE REASSESS COMMENT FT1
discharge dispo received. pt denies any complaints. awaiting ambulance transport. Vital Signs Stable. all questions and concerns addressed.

## 2020-09-18 NOTE — ED PROVIDER NOTE - PATIENT PORTAL LINK FT
You can access the FollowMyHealth Patient Portal offered by [facility] by registering at the following website: [email or web address]. By joining [employer]’s FollowMyHealth portal, you will also be able to view your health information using other applications (apps) compatible with our system.

## 2020-09-18 NOTE — ED ADULT NURSE NOTE - OBJECTIVE STATEMENT
pt presents to ed a&ox3, no acute distress c/o swelling to LLE x1 week, sent by Dr Miranda for r/o DVT. notable swelling and pitting edema noted to LLE. denies pain. denies chest pain or sob. no other complaints at this time. reports slipped out of wheelchair last week and fell to floor where he bumped his knee. small superficial abrasion noted to L knee. no bleeding noted at this time. no bruising or redness noted. denies hitting head. denies blood thinners.

## 2020-09-18 NOTE — ED ADULT TRIAGE NOTE - CHIEF COMPLAINT QUOTE
Pt A&Ox4 states "My left leg is more swollen."  BIBA c/o LLE swelling. Patient is a parapalgic sent from MD office to r/o DVT, has bilat LE swelling +4 to LLE

## 2020-09-18 NOTE — ED ADULT NURSE NOTE - INTERVENTIONS DEFINITIONS
Non-slip footwear when patient is off stretcher/[place] to call system/Stretcher in lowest position, wheels locked, appropriate side rails in place/Call bell, personal items and telephone within reach

## 2020-09-19 VITALS
HEART RATE: 70 BPM | RESPIRATION RATE: 16 BRPM | DIASTOLIC BLOOD PRESSURE: 74 MMHG | SYSTOLIC BLOOD PRESSURE: 119 MMHG | TEMPERATURE: 98 F | OXYGEN SATURATION: 98 %

## 2020-09-23 NOTE — PHYSICAL EXAM
[No Acute Distress] : no acute distress [Well Nourished] : well nourished [Well Developed] : well developed [Well-Appearing] : well-appearing [Normal Sclera/Conjunctiva] : normal sclera/conjunctiva [PERRL] : pupils equal round and reactive to light [EOMI] : extraocular movements intact [Normal Outer Ear/Nose] : the outer ears and nose were normal in appearance [Normal Oropharynx] : the oropharynx was normal [No JVD] : no jugular venous distention [No Lymphadenopathy] : no lymphadenopathy [Supple] : supple [Thyroid Normal, No Nodules] : the thyroid was normal and there were no nodules present [No Respiratory Distress] : no respiratory distress  [No Accessory Muscle Use] : no accessory muscle use [Clear to Auscultation] : lungs were clear to auscultation bilaterally [Normal Rate] : normal rate  [Regular Rhythm] : with a regular rhythm [Normal S1, S2] : normal S1 and S2 [No Murmur] : no murmur heard [No Carotid Bruits] : no carotid bruits [No Abdominal Bruit] : a ~M bruit was not heard ~T in the abdomen [No Varicosities] : no varicosities [Pedal Pulses Present] : the pedal pulses are present [No Edema] : there was no peripheral edema [No Palpable Aorta] : no palpable aorta [No Extremity Clubbing/Cyanosis] : no extremity clubbing/cyanosis [Soft] : abdomen soft [Non Tender] : non-tender [Non-distended] : non-distended [No Masses] : no abdominal mass palpated [No HSM] : no HSM [Normal Bowel Sounds] : normal bowel sounds [Normal Posterior Cervical Nodes] : no posterior cervical lymphadenopathy [Normal Anterior Cervical Nodes] : no anterior cervical lymphadenopathy [No CVA Tenderness] : no CVA  tenderness [No Spinal Tenderness] : no spinal tenderness [No Joint Swelling] : no joint swelling [Grossly Normal Strength/Tone] : grossly normal strength/tone [No Rash] : no rash [Coordination Grossly Intact] : coordination grossly intact [No Focal Deficits] : no focal deficits [Normal Gait] : normal gait [Normal Affect] : the affect was normal [Normal Insight/Judgement] : insight and judgment were intact [de-identified] : left ankle swelling
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
## 2020-09-23 NOTE — PLAN
[FreeTextEntry1] : left leg swelling came here by ambulette\par needs to go to Er for Left ankle swelling - r/o DVT\par EMS called and sent to ER\par

## 2020-09-24 ENCOUNTER — EMERGENCY (EMERGENCY)
Facility: HOSPITAL | Age: 55
LOS: 1 days | Discharge: HOME | End: 2020-09-24
Attending: EMERGENCY MEDICINE
Payer: MEDICARE

## 2020-09-24 VITALS — WEIGHT: 315 LBS | HEIGHT: 72 IN

## 2020-09-24 VITALS — SYSTOLIC BLOOD PRESSURE: 138 MMHG | HEART RATE: 94 BPM | DIASTOLIC BLOOD PRESSURE: 81 MMHG

## 2020-09-24 PROCEDURE — 99284 EMERGENCY DEPT VISIT MOD MDM: CPT | Mod: 25

## 2020-09-24 PROCEDURE — 93971 EXTREMITY STUDY: CPT | Mod: 26,LT

## 2020-09-24 PROCEDURE — 93971 EXTREMITY STUDY: CPT

## 2020-09-24 PROCEDURE — 73610 X-RAY EXAM OF ANKLE: CPT

## 2020-09-24 PROCEDURE — 73610 X-RAY EXAM OF ANKLE: CPT | Mod: 26,LT

## 2020-09-24 PROCEDURE — 99284 EMERGENCY DEPT VISIT MOD MDM: CPT

## 2020-09-24 RX ORDER — BACLOFEN 100 %
30 POWDER (GRAM) MISCELLANEOUS ONCE
Refills: 0 | Status: COMPLETED | OUTPATIENT
Start: 2020-09-24 | End: 2020-09-24

## 2020-09-24 RX ADMIN — Medication 30 MILLIGRAM(S): at 18:52

## 2020-09-24 NOTE — ED ADULT TRIAGE NOTE - CHIEF COMPLAINT QUOTE
Patient c/o left lower extremity swelling.  Patient with b/l lower extremity swelling, left seems more swollen. positive pulses bilaterally, denies pain, no shortness of breath/difficulty breathing.

## 2020-09-24 NOTE — ED PROVIDER NOTE - PROGRESS NOTE DETAILS
no DVT, no ankle fraxture. again demineralization, pt explained findings. likely soft tissue injury will d/c -Slowey DO

## 2020-09-24 NOTE — ED PROVIDER NOTE - CLINICAL SUMMARY MEDICAL DECISION MAKING FREE TEXT BOX
reviewed last w/u, duplex negative but incomplete visualization- will repat. had multiple xray no obvious fx but significant demineralization, will xray ankle as wasn't completed. since patient nonweightbearing limited utility for CT/MRI for ocult fracture

## 2020-09-24 NOTE — ED PROVIDER NOTE - OBJECTIVE STATEMENT
54yo M paraplegic with injury to left leg ~ 1 week ago, stuck between bed and wall when getting out and twisted. no pain but noted swelling. last visit had Xray and US all negative per patient. tried to put on brace today but was having difficult so came to ED. swelling worsening gradually. no CP/SOB. no swelling elsehwere.

## 2020-09-24 NOTE — ED PROVIDER NOTE - PHYSICAL EXAMINATION
Gen: NAD, AOx3  Head: NCAT  HEENT: EOMI, oral mucosa moist, normal conjunctiva, neck supple  Lung: no respiratory distress, CTAB  CV:  Normal perfusion  MSK: chronic deformity b/l LE, +2 swelling left foot/ankle/calf with warmth noted in ankle region, no erythema, no drainage/wounds, no TTP  Neuro: paraplegic  Skin: No rash   Psych: normal affect

## 2020-09-24 NOTE — ED PROVIDER NOTE - NSFOLLOWUPINSTRUCTIONS_ED_ALL_ED_FT
1. Return to ED for worsening, progressive or any other concerning symptoms   2. Follow up with your primary care doctor in 2-3days   3. Take motrin 400mg every 6 hours as needed for pain and Take Tylenol up to 1000 mg every 6 hours as needed for pain.     Sprain    A sprain is a stretch or tear in one of the tough, fiber-like tissues (ligaments) in your body. This is caused by an injury to the area such as a twisting mechanism. Symptoms include pain, swelling, or bruising. Rest that area over the next several days and slowly resume activity when tolerated. Ice can help with swelling and pain.     SEEK IMMEDIATE MEDICAL CARE IF YOU HAVE ANY OF THE FOLLOWING SYMPTOMS: worsening pain, inability to move that body part, numbness or tingling.

## 2020-09-24 NOTE — ED PROVIDER NOTE - PATIENT PORTAL LINK FT
You can access the FollowMyHealth Patient Portal offered by [facility] by registering at the following website: [email or web address]. By joining [employer]’s FollowMyHealth portal, you will also be able to view your health information using other applications (apps) compatible with our system.

## 2020-09-28 ENCOUNTER — APPOINTMENT (OUTPATIENT)
Dept: INTERNAL MEDICINE | Facility: CLINIC | Age: 55
End: 2020-09-28

## 2020-10-03 ENCOUNTER — TRANSCRIPTION ENCOUNTER (OUTPATIENT)
Age: 55
End: 2020-10-03

## 2020-10-12 ENCOUNTER — APPOINTMENT (OUTPATIENT)
Dept: FAMILY MEDICINE | Facility: CLINIC | Age: 55
End: 2020-10-12
Payer: MEDICARE

## 2020-10-12 VITALS
HEART RATE: 74 BPM | TEMPERATURE: 97.7 F | WEIGHT: 215 LBS | SYSTOLIC BLOOD PRESSURE: 130 MMHG | DIASTOLIC BLOOD PRESSURE: 84 MMHG | BODY MASS INDEX: 29.12 KG/M2 | OXYGEN SATURATION: 94 % | HEIGHT: 72 IN

## 2020-10-12 DIAGNOSIS — E78.5 HYPERLIPIDEMIA, UNSPECIFIED: ICD-10-CM

## 2020-10-12 DIAGNOSIS — R60.0 LOCALIZED EDEMA: ICD-10-CM

## 2020-10-12 PROCEDURE — 36415 COLL VENOUS BLD VENIPUNCTURE: CPT

## 2020-10-12 PROCEDURE — 99215 OFFICE O/P EST HI 40 MIN: CPT | Mod: 25

## 2020-10-13 PROBLEM — R60.0 PEDAL EDEMA: Noted: 2020-10-13

## 2020-10-13 RX ORDER — HYDROCHLOROTHIAZIDE 12.5 MG/1
12.5 CAPSULE ORAL
Qty: 90 | Refills: 1 | Status: DISCONTINUED | COMMUNITY
Start: 2020-08-13 | End: 2020-10-13

## 2020-10-13 NOTE — HISTORY OF PRESENT ILLNESS
[FreeTextEntry8] : Mr. MOOKIE PIPER is a 55 year male who presents to office to establish care. Patient states he has a history of lower leg swelling but seems to be worsening in the past 2 months. \par Patient is also requesting refill of ED medications. According to patient, his insurance does not cover sildenafil and would like to try Levitra.\par He is due for labs today. He is requesting referrals for  Dentist, PT, Neurologist.

## 2020-10-13 NOTE — PLAN
[FreeTextEntry1] : 1. Labs in office today\par 2. Follow up in 1 month to re-eval leg swelling\par 3. Rx new ED medication\par 4. Catch up Health Maintenance at follow up\par

## 2020-10-13 NOTE — PHYSICAL EXAM
[No Acute Distress] : no acute distress [Well Nourished] : well nourished [Well Developed] : well developed [Normal Sclera/Conjunctiva] : normal sclera/conjunctiva [EOMI] : extraocular movements intact [Normal Outer Ear/Nose] : the outer ears and nose were normal in appearance [No Lymphadenopathy] : no lymphadenopathy [Supple] : supple [No Respiratory Distress] : no respiratory distress  [No Accessory Muscle Use] : no accessory muscle use [Clear to Auscultation] : lungs were clear to auscultation bilaterally [Normal Rate] : normal rate  [Regular Rhythm] : with a regular rhythm [Normal S1, S2] : normal S1 and S2 [No Murmur] : no murmur heard [No Rash] : no rash [Coordination Grossly Intact] : coordination grossly intact [No Focal Deficits] : no focal deficits [Normal Gait] : normal gait [Normal Affect] : the affect was normal [de-identified] : bilateral pedal edema [de-identified] : Paraplegic

## 2020-10-16 LAB
ALBUMIN SERPL ELPH-MCNC: 4.2 G/DL
ALP BLD-CCNC: 60 U/L
ALT SERPL-CCNC: 20 U/L
ANION GAP SERPL CALC-SCNC: 12 MMOL/L
AST SERPL-CCNC: 17 U/L
BASOPHILS # BLD AUTO: 0.06 K/UL
BASOPHILS NFR BLD AUTO: 1 %
BILIRUB SERPL-MCNC: 0.2 MG/DL
BUN SERPL-MCNC: 15 MG/DL
CALCIUM SERPL-MCNC: 9 MG/DL
CHLORIDE SERPL-SCNC: 108 MMOL/L
CHOLEST SERPL-MCNC: 150 MG/DL
CHOLEST/HDLC SERPL: 3.5 RATIO
CO2 SERPL-SCNC: 23 MMOL/L
CREAT SERPL-MCNC: 0.78 MG/DL
EOSINOPHIL # BLD AUTO: 0.18 K/UL
EOSINOPHIL NFR BLD AUTO: 3 %
ESTIMATED AVERAGE GLUCOSE: 111 MG/DL
GLUCOSE SERPL-MCNC: 117 MG/DL
HBA1C MFR BLD HPLC: 5.5 %
HCT VFR BLD CALC: 44.9 %
HDLC SERPL-MCNC: 43 MG/DL
HGB BLD-MCNC: 14.4 G/DL
IMM GRANULOCYTES NFR BLD AUTO: 0.3 %
LDLC SERPL CALC-MCNC: 54 MG/DL
LYMPHOCYTES # BLD AUTO: 1.38 K/UL
LYMPHOCYTES NFR BLD AUTO: 22.7 %
MAN DIFF?: NORMAL
MCHC RBC-ENTMCNC: 29.4 PG
MCHC RBC-ENTMCNC: 32.1 GM/DL
MCV RBC AUTO: 91.8 FL
MONOCYTES # BLD AUTO: 0.63 K/UL
MONOCYTES NFR BLD AUTO: 10.3 %
NEUTROPHILS # BLD AUTO: 3.82 K/UL
NEUTROPHILS NFR BLD AUTO: 62.7 %
PLATELET # BLD AUTO: 164 K/UL
POTASSIUM SERPL-SCNC: 4.5 MMOL/L
PROT SERPL-MCNC: 5.9 G/DL
RBC # BLD: 4.89 M/UL
RBC # FLD: 14.6 %
SODIUM SERPL-SCNC: 143 MMOL/L
TRIGL SERPL-MCNC: 266 MG/DL
TSH SERPL-ACNC: 1.31 UIU/ML
WBC # FLD AUTO: 6.09 K/UL

## 2020-10-26 LAB
TESTOST BND SERPL-MCNC: 4.6 PG/ML
TESTOST SERPL-MCNC: 308.3 NG/DL

## 2020-10-27 ENCOUNTER — TRANSCRIPTION ENCOUNTER (OUTPATIENT)
Age: 55
End: 2020-10-27

## 2020-11-04 ENCOUNTER — EMERGENCY (EMERGENCY)
Facility: HOSPITAL | Age: 55
LOS: 1 days | Discharge: HOME | End: 2020-11-04
Attending: STUDENT IN AN ORGANIZED HEALTH CARE EDUCATION/TRAINING PROGRAM
Payer: MEDICARE

## 2020-11-04 VITALS
WEIGHT: 190.04 LBS | HEIGHT: 72 IN | SYSTOLIC BLOOD PRESSURE: 139 MMHG | HEART RATE: 75 BPM | TEMPERATURE: 98 F | OXYGEN SATURATION: 96 % | DIASTOLIC BLOOD PRESSURE: 90 MMHG | RESPIRATION RATE: 18 BRPM

## 2020-11-04 LAB
ALBUMIN SERPL ELPH-MCNC: 4.5 G/DL — SIGNIFICANT CHANGE UP (ref 3.3–5.2)
ALP SERPL-CCNC: 68 U/L — SIGNIFICANT CHANGE UP (ref 40–120)
ALT FLD-CCNC: 28 U/L — SIGNIFICANT CHANGE UP
ANION GAP SERPL CALC-SCNC: 11 MMOL/L — SIGNIFICANT CHANGE UP (ref 5–17)
APPEARANCE UR: CLEAR — SIGNIFICANT CHANGE UP
AST SERPL-CCNC: 27 U/L — SIGNIFICANT CHANGE UP
BILIRUB SERPL-MCNC: 0.4 MG/DL — SIGNIFICANT CHANGE UP (ref 0.4–2)
BILIRUB UR-MCNC: NEGATIVE — SIGNIFICANT CHANGE UP
BUN SERPL-MCNC: 14 MG/DL — SIGNIFICANT CHANGE UP (ref 8–20)
CALCIUM SERPL-MCNC: 9.6 MG/DL — SIGNIFICANT CHANGE UP (ref 8.6–10.2)
CHLORIDE SERPL-SCNC: 102 MMOL/L — SIGNIFICANT CHANGE UP (ref 98–107)
CK SERPL-CCNC: 97 U/L — SIGNIFICANT CHANGE UP (ref 30–200)
CO2 SERPL-SCNC: 30 MMOL/L — HIGH (ref 22–29)
COLOR SPEC: YELLOW — SIGNIFICANT CHANGE UP
CREAT SERPL-MCNC: 0.79 MG/DL — SIGNIFICANT CHANGE UP (ref 0.5–1.3)
DIFF PNL FLD: NEGATIVE — SIGNIFICANT CHANGE UP
GLUCOSE SERPL-MCNC: 118 MG/DL — HIGH (ref 70–99)
GLUCOSE UR QL: NEGATIVE MG/DL — SIGNIFICANT CHANGE UP
HCT VFR BLD CALC: 48.1 % — SIGNIFICANT CHANGE UP (ref 39–50)
HGB BLD-MCNC: 15.8 G/DL — SIGNIFICANT CHANGE UP (ref 13–17)
KETONES UR-MCNC: NEGATIVE — SIGNIFICANT CHANGE UP
LEUKOCYTE ESTERASE UR-ACNC: NEGATIVE — SIGNIFICANT CHANGE UP
MAGNESIUM SERPL-MCNC: 1.8 MG/DL — SIGNIFICANT CHANGE UP (ref 1.8–2.6)
MCHC RBC-ENTMCNC: 29.6 PG — SIGNIFICANT CHANGE UP (ref 27–34)
MCHC RBC-ENTMCNC: 32.8 GM/DL — SIGNIFICANT CHANGE UP (ref 32–36)
MCV RBC AUTO: 90.1 FL — SIGNIFICANT CHANGE UP (ref 80–100)
NITRITE UR-MCNC: NEGATIVE — SIGNIFICANT CHANGE UP
PH UR: 7 — SIGNIFICANT CHANGE UP (ref 5–8)
PLATELET # BLD AUTO: 202 K/UL — SIGNIFICANT CHANGE UP (ref 150–400)
POTASSIUM SERPL-MCNC: 4.3 MMOL/L — SIGNIFICANT CHANGE UP (ref 3.5–5.3)
POTASSIUM SERPL-SCNC: 4.3 MMOL/L — SIGNIFICANT CHANGE UP (ref 3.5–5.3)
PROT SERPL-MCNC: 7.1 G/DL — SIGNIFICANT CHANGE UP (ref 6.6–8.7)
PROT UR-MCNC: NEGATIVE MG/DL — SIGNIFICANT CHANGE UP
RBC # BLD: 5.34 M/UL — SIGNIFICANT CHANGE UP (ref 4.2–5.8)
RBC # FLD: 13.4 % — SIGNIFICANT CHANGE UP (ref 10.3–14.5)
SODIUM SERPL-SCNC: 143 MMOL/L — SIGNIFICANT CHANGE UP (ref 135–145)
SP GR SPEC: 1 — LOW (ref 1.01–1.02)
TROPONIN T SERPL-MCNC: <0.01 NG/ML — SIGNIFICANT CHANGE UP (ref 0–0.06)
UROBILINOGEN FLD QL: NEGATIVE MG/DL — SIGNIFICANT CHANGE UP
WBC # BLD: 6.23 K/UL — SIGNIFICANT CHANGE UP (ref 3.8–10.5)
WBC # FLD AUTO: 6.23 K/UL — SIGNIFICANT CHANGE UP (ref 3.8–10.5)

## 2020-11-04 PROCEDURE — 93010 ELECTROCARDIOGRAM REPORT: CPT

## 2020-11-04 PROCEDURE — 70450 CT HEAD/BRAIN W/O DYE: CPT

## 2020-11-04 PROCEDURE — 82550 ASSAY OF CK (CPK): CPT

## 2020-11-04 PROCEDURE — 85027 COMPLETE CBC AUTOMATED: CPT

## 2020-11-04 PROCEDURE — 83735 ASSAY OF MAGNESIUM: CPT

## 2020-11-04 PROCEDURE — 99285 EMERGENCY DEPT VISIT HI MDM: CPT

## 2020-11-04 PROCEDURE — 36415 COLL VENOUS BLD VENIPUNCTURE: CPT

## 2020-11-04 PROCEDURE — 81001 URINALYSIS AUTO W/SCOPE: CPT

## 2020-11-04 PROCEDURE — 93005 ELECTROCARDIOGRAM TRACING: CPT

## 2020-11-04 PROCEDURE — 80053 COMPREHEN METABOLIC PANEL: CPT

## 2020-11-04 PROCEDURE — 99284 EMERGENCY DEPT VISIT MOD MDM: CPT | Mod: 25

## 2020-11-04 PROCEDURE — 71045 X-RAY EXAM CHEST 1 VIEW: CPT

## 2020-11-04 PROCEDURE — 71045 X-RAY EXAM CHEST 1 VIEW: CPT | Mod: 26

## 2020-11-04 PROCEDURE — 84484 ASSAY OF TROPONIN QUANT: CPT
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
## 2020-11-04 NOTE — ED PROVIDER NOTE - CLINICAL SUMMARY MEDICAL DECISION MAKING FREE TEXT BOX
patient called as CODE STROKE by RN, canceled. NIH 0 has baseline paraplegia. complaint of total body heavinee- b/l arms. and 'brain fog' but AOx3 and speaking clearly. will check labs. screening EKG. ct head likely dc

## 2020-11-04 NOTE — ED PROVIDER NOTE - PROGRESS NOTE DETAILS
Pt states he felt entire body heaviness earlier but denies any acute neuro deficit occuring pt feels completely better here, labs at baseline, no acute findings on cth, pt follow up with as outpt

## 2020-11-04 NOTE — ED PROVIDER NOTE - PATIENT PORTAL LINK FT
You can access the FollowMyHealth Patient Portal offered by [facility] by registering at the following website: [email or web address]. By joining [employer]’s FollowMyHealth portal, you will also be able to view your health information using other applications (apps) compatible with our system.
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
## 2020-11-04 NOTE — ED PROVIDER NOTE - NSFOLLOWUPINSTRUCTIONS_ED_ALL_ED_FT
Medical Clearance    A medical screening exam has been done. This exam is meant to determine whether you need emergency treatment. Your exam has not demonstrated the need for emergency treatment at this point. It is safe for you to go to your caregiver's office or clinic for further evaluation and/or treatment. You should make an appointment to see your caregiver as soon as he or she is available.    1) Please follow-up with your primary care doctor in the next 2 days.  Please call tomorrow for an appointment.  If you cannot follow-up with your primary care doctor please return to the ED for any urgent issues.  2) You were given a copy of the tests performed today.  Please bring the results with you and review them with your primary care doctor.  3) If you have any worsening of symptoms or any other concerns please return to the ED immediately.  4) Please continue taking your home medications as directed.

## 2020-11-04 NOTE — ED PROVIDER NOTE - PHYSICAL EXAMINATION
Gen: NAD, AOx3  Head: NCAT  HEENT: PERRL, EOMI, oral mucosa moist, normal conjunctiva, neck supple  Lung: CTAB, no respiratory distress  CV: rrr, no murmur, Normal perfusion  Abd: soft, NTND  MSK: no visible deformities  Neuro: CN II-XII intact, 5/5 UE strength, sensation intact, no dysmetria/ataxia, paralyzed b/l LE- legs in brace  Skin: No rash   Psych: normal affect

## 2020-11-05 VITALS
HEART RATE: 84 BPM | TEMPERATURE: 98 F | DIASTOLIC BLOOD PRESSURE: 71 MMHG | SYSTOLIC BLOOD PRESSURE: 114 MMHG | RESPIRATION RATE: 18 BRPM | OXYGEN SATURATION: 93 %

## 2020-11-05 PROCEDURE — 70450 CT HEAD/BRAIN W/O DYE: CPT | Mod: 26

## 2020-11-11 ENCOUNTER — APPOINTMENT (OUTPATIENT)
Dept: NEUROLOGY | Facility: CLINIC | Age: 55
End: 2020-11-11
Payer: MEDICARE

## 2020-11-11 PROCEDURE — 99072 ADDL SUPL MATRL&STAF TM PHE: CPT

## 2020-11-11 PROCEDURE — 99202 OFFICE O/P NEW SF 15 MIN: CPT

## 2020-11-11 RX ORDER — VARDENAFIL 10 MG/1
10 TABLET, FILM COATED ORAL DAILY
Qty: 10 | Refills: 3 | Status: DISCONTINUED | COMMUNITY
Start: 2020-10-13 | End: 2020-11-11

## 2020-11-11 NOTE — PHYSICAL EXAM
[General Appearance - Alert] : alert [General Appearance - In No Acute Distress] : in no acute distress [Person] : oriented to person [Place] : oriented to place [Visual Intact] : visual attention was ~T not ~L decreased [Writing A Sentence] : no difficulty writing a sentence [Fluency] : fluency intact [Comprehension] : comprehension intact [Reading] : reading intact [Current Events] : adequate knowledge of current events [Past History] : adequate knowledge of personal past history [Vocabulary] : adequate range of vocabulary [Cranial Nerves Optic (II)] : visual acuity intact bilaterally,  visual fields full to confrontation, pupils equal round and reactive to light [Cranial Nerves Oculomotor (III)] : extraocular motion intact [Cranial Nerves Trigeminal (V)] : facial sensation intact symmetrically [Cranial Nerves Facial (VII)] : face symmetrical [Cranial Nerves Vestibulocochlear (VIII)] : hearing was intact bilaterally [Cranial Nerves Glossopharyngeal (IX)] : tongue and palate midline [Cranial Nerves Accessory (XI - Cranial And Spinal)] : head turning and shoulder shrug symmetric [Cranial Nerves Hypoglossal (XII)] : there was no tongue deviation with protrusion [Motor Handedness Right-Handed] : the patient is right hand dominant [Paraparesis (Lower Extremities)] : the patient was paraplegic [Motor Strength Upper Extremities Bilaterally] : strength was normal in both upper extremities [Motor Strength Lower Extremities Bilaterally] : there was weakness in both lower extremities [Tactile Decrease Entire Leg Right] : diminished right leg [Tactile Decrease Entire Leg Left] : diminished left leg [1+] : Brachioradialis left 1+ [0] : Patella left 0 [FreeTextEntry6] : flexion contraction of both knees, bilateral ankle-foot orthosis
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
## 2020-11-11 NOTE — REVIEW OF SYSTEMS
[As Noted in HPI] : as noted in HPI [Leg Weakness] : leg weakness [Numbness] : numbness [Tingling] : tingling [Abnormal Sensation] : an abnormal sensation [Inability to Walk] : inability to walk [Negative] : Heme/Lymph
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
## 2020-11-24 ENCOUNTER — APPOINTMENT (OUTPATIENT)
Dept: FAMILY MEDICINE | Facility: CLINIC | Age: 55
End: 2020-11-24
Payer: MEDICARE

## 2020-11-24 VITALS
HEART RATE: 88 BPM | SYSTOLIC BLOOD PRESSURE: 126 MMHG | TEMPERATURE: 97.4 F | OXYGEN SATURATION: 93 % | HEIGHT: 72 IN | DIASTOLIC BLOOD PRESSURE: 75 MMHG | WEIGHT: 215 LBS | BODY MASS INDEX: 29.12 KG/M2

## 2020-11-24 DIAGNOSIS — M77.8 OTHER ENTHESOPATHIES, NOT ELSEWHERE CLASSIFIED: ICD-10-CM

## 2020-11-24 DIAGNOSIS — Z12.11 ENCOUNTER FOR SCREENING FOR MALIGNANT NEOPLASM OF COLON: ICD-10-CM

## 2020-11-24 DIAGNOSIS — Z12.12 ENCOUNTER FOR SCREENING FOR MALIGNANT NEOPLASM OF COLON: ICD-10-CM

## 2020-11-24 PROCEDURE — 99214 OFFICE O/P EST MOD 30 MIN: CPT

## 2020-11-25 PROBLEM — M77.8 TENDONITIS OF ELBOW, RIGHT: Status: ACTIVE | Noted: 2020-11-25

## 2020-11-25 PROBLEM — Z12.11 SCREENING FOR COLORECTAL CANCER: Status: RESOLVED | Noted: 2018-12-06 | Resolved: 2020-11-24

## 2020-11-25 NOTE — PHYSICAL EXAM
[No Acute Distress] : no acute distress [Well Nourished] : well nourished [Well Developed] : well developed [Normal Sclera/Conjunctiva] : normal sclera/conjunctiva [EOMI] : extraocular movements intact [Normal Outer Ear/Nose] : the outer ears and nose were normal in appearance [No Lymphadenopathy] : no lymphadenopathy [Supple] : supple [No Respiratory Distress] : no respiratory distress  [No Accessory Muscle Use] : no accessory muscle use [Clear to Auscultation] : lungs were clear to auscultation bilaterally [Normal Rate] : normal rate  [Regular Rhythm] : with a regular rhythm [Normal S1, S2] : normal S1 and S2 [No Murmur] : no murmur heard [No Rash] : no rash [Coordination Grossly Intact] : coordination grossly intact [No Focal Deficits] : no focal deficits [Normal Gait] : normal gait [Normal Affect] : the affect was normal [de-identified] : bilateral pedal edema [de-identified] : Paraplegic

## 2020-11-25 NOTE — HISTORY OF PRESENT ILLNESS
[FreeTextEntry1] : c/o of right elbow pain\par follow up for paraplegic spasticity [de-identified] : Mr. MOOKIE PIPER is a 55 year male who presents to office c/o of right elbow pain, it is a recurring symptoms and he typically goes for physical therapy to help with pain.\par Patient also had appointment with Neurology and has to change to new neurologist because of distance and convenience of the building. Patient has the appointment for Dec. He is also due to see Urologist in December as well. \par [facility]

## 2020-11-27 ENCOUNTER — RX RENEWAL (OUTPATIENT)
Age: 55
End: 2020-11-27

## 2020-11-30 ENCOUNTER — APPOINTMENT (OUTPATIENT)
Dept: FAMILY MEDICINE | Facility: CLINIC | Age: 55
End: 2020-11-30

## 2020-12-07 ENCOUNTER — APPOINTMENT (OUTPATIENT)
Dept: UROLOGY | Facility: CLINIC | Age: 55
End: 2020-12-07
Payer: MEDICARE

## 2020-12-07 VITALS
DIASTOLIC BLOOD PRESSURE: 77 MMHG | BODY MASS INDEX: 29.12 KG/M2 | HEART RATE: 96 BPM | WEIGHT: 215 LBS | SYSTOLIC BLOOD PRESSURE: 119 MMHG | RESPIRATION RATE: 16 BRPM | HEIGHT: 72 IN | TEMPERATURE: 98.6 F

## 2020-12-07 DIAGNOSIS — N52.9 MALE ERECTILE DYSFUNCTION, UNSPECIFIED: ICD-10-CM

## 2020-12-07 PROCEDURE — 99072 ADDL SUPL MATRL&STAF TM PHE: CPT

## 2020-12-07 PROCEDURE — 99203 OFFICE O/P NEW LOW 30 MIN: CPT

## 2020-12-07 RX ORDER — AMOXICILLIN AND CLAVULANATE POTASSIUM 875; 125 MG/1; MG/1
875-125 TABLET, COATED ORAL
Qty: 20 | Refills: 0 | Status: DISCONTINUED | COMMUNITY
Start: 2020-08-17 | End: 2020-12-07

## 2020-12-07 NOTE — ASSESSMENT
[FreeTextEntry1] : Impression:\par \par erectile dysfunction\par \par Plan:\par \par tadalafil 20 mg\par Followup in 6 months.

## 2020-12-07 NOTE — LETTER BODY
[Dear  ___] : Dear  [unfilled], [Courtesy Letter:] : I had the pleasure of seeing your patient, [unfilled], in my office today. [Please see my note below.] : Please see my note below. [Sincerely,] : Sincerely, [FreeTextEntry3] : Ed\par \par Abraham Lee MD\par [facility]\par  of Urology\par [employer] at [facility]\par

## 2020-12-07 NOTE — HISTORY OF PRESENT ILLNESS
[FreeTextEntry1] : patient has not had adequate erections.  Libido is ok.  he has tried sildenafil. He took 100 mg.  It was on an empty stomach.  He

## 2020-12-16 ENCOUNTER — TRANSCRIPTION ENCOUNTER (OUTPATIENT)
Age: 55
End: 2020-12-16

## 2021-01-04 ENCOUNTER — TRANSCRIPTION ENCOUNTER (OUTPATIENT)
Age: 56
End: 2021-01-04

## 2021-01-08 ENCOUNTER — RX RENEWAL (OUTPATIENT)
Age: 56
End: 2021-01-08

## 2021-01-21 ENCOUNTER — TRANSCRIPTION ENCOUNTER (OUTPATIENT)
Age: 56
End: 2021-01-21

## 2021-02-03 ENCOUNTER — RX RENEWAL (OUTPATIENT)
Age: 56
End: 2021-02-03

## 2021-02-23 ENCOUNTER — RX RENEWAL (OUTPATIENT)
Age: 56
End: 2021-02-23

## 2021-02-24 ENCOUNTER — APPOINTMENT (OUTPATIENT)
Dept: FAMILY MEDICINE | Facility: CLINIC | Age: 56
End: 2021-02-24

## 2021-03-04 ENCOUNTER — RX RENEWAL (OUTPATIENT)
Age: 56
End: 2021-03-04

## 2021-03-05 ENCOUNTER — RX RENEWAL (OUTPATIENT)
Age: 56
End: 2021-03-05

## 2021-03-16 ENCOUNTER — RX RENEWAL (OUTPATIENT)
Age: 56
End: 2021-03-16

## 2021-03-29 ENCOUNTER — RX RENEWAL (OUTPATIENT)
Age: 56
End: 2021-03-29

## 2021-03-30 ENCOUNTER — RX RENEWAL (OUTPATIENT)
Age: 56
End: 2021-03-30

## 2021-04-16 ENCOUNTER — APPOINTMENT (OUTPATIENT)
Dept: FAMILY MEDICINE | Facility: CLINIC | Age: 56
End: 2021-04-16

## 2021-05-06 ENCOUNTER — APPOINTMENT (OUTPATIENT)
Dept: FAMILY MEDICINE | Facility: CLINIC | Age: 56
End: 2021-05-06
Payer: MEDICARE

## 2021-05-06 VITALS
TEMPERATURE: 98.6 F | HEIGHT: 72 IN | DIASTOLIC BLOOD PRESSURE: 80 MMHG | HEART RATE: 81 BPM | OXYGEN SATURATION: 96 % | BODY MASS INDEX: 29.12 KG/M2 | WEIGHT: 215 LBS | SYSTOLIC BLOOD PRESSURE: 100 MMHG

## 2021-05-06 VITALS — DIASTOLIC BLOOD PRESSURE: 82 MMHG | SYSTOLIC BLOOD PRESSURE: 108 MMHG

## 2021-05-06 DIAGNOSIS — R53.83 OTHER FATIGUE: ICD-10-CM

## 2021-05-06 DIAGNOSIS — R32 UNSPECIFIED URINARY INCONTINENCE: ICD-10-CM

## 2021-05-06 DIAGNOSIS — E78.1 PURE HYPERGLYCERIDEMIA: ICD-10-CM

## 2021-05-06 DIAGNOSIS — E55.9 VITAMIN D DEFICIENCY, UNSPECIFIED: ICD-10-CM

## 2021-05-06 PROCEDURE — 99072 ADDL SUPL MATRL&STAF TM PHE: CPT

## 2021-05-06 PROCEDURE — 99214 OFFICE O/P EST MOD 30 MIN: CPT

## 2021-05-06 RX ORDER — TADALAFIL 10 MG/1
10 TABLET, FILM COATED ORAL
Qty: 12 | Refills: 3 | Status: DISCONTINUED | COMMUNITY
Start: 2020-11-11 | End: 2021-05-06

## 2021-05-06 RX ORDER — AMOXICILLIN 500 MG/1
500 CAPSULE ORAL
Qty: 4 | Refills: 3 | Status: DISCONTINUED | COMMUNITY
Start: 2018-09-19 | End: 2021-05-06

## 2021-05-06 NOTE — PHYSICAL EXAM
[Normal] : affect was normal and insight and judgment were intact [de-identified] : motor strength 1/5 b/l LE, 5/5 UE b/l, sensation intact
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
## 2021-05-06 NOTE — HEALTH RISK ASSESSMENT
[Patient reported colonoscopy was normal] : Patient reported colonoscopy was normal [Good] : ~his/her~  mood as  good [Yes] : Yes [Monthly or less (1 pt)] : Monthly or less (1 point) [1 or 2 (0 pts)] : 1 or 2 (0 points) [Never (0 pts)] : Never (0 points) [No] : In the past 12 months have you used drugs other than those required for medical reasons? No [No falls in past year] : Patient reported no falls in the past year [Patient not ambulatory] : Patient is not ambulatory [Assistive Device] : Patient uses an assistive device [2] : 1) Little interest or pleasure doing things for more than half of the days (2) [3] : 2) Feeling down, depressed, or hopeless for nearly every day (3) [HIV test declined] : HIV test declined [Hepatitis C test declined] : Hepatitis C test declined [Alone] : lives alone [On disability] : on disability [Single] : single [Feels Safe at Home] : Feels safe at home [Fully functional (bathing, dressing, toileting, transferring, walking, feeding)] : Fully functional (bathing, dressing, toileting, transferring, walking, feeding) [Fully functional (using the telephone, shopping, preparing meals, housekeeping, doing laundry, using] : Fully functional and needs no help or supervision to perform IADLs (using the telephone, shopping, preparing meals, housekeeping, doing laundry, using transportation, managing medications and managing finances) [] : No [de-identified] : 13 yrs, 1 ppd [YearQuit] : 1994 [Audit-CScore] : 1 [DUL2Zeici] : 5 [KAI9Lhqvy] : 14 [Sexually Active] : not sexually active [High Risk Behavior] : no high risk behavior [Reports changes in hearing] : Reports no changes in hearing [Reports changes in vision] : Reports no changes in vision [Reports changes in dental health] : Reports no changes in dental health [ColonoscopyDate] : 12/20 [ColonoscopyComments] : Cologuard
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
## 2021-05-17 ENCOUNTER — APPOINTMENT (OUTPATIENT)
Dept: UROLOGY | Facility: CLINIC | Age: 56
End: 2021-05-17

## 2021-05-18 DIAGNOSIS — M77.01 MEDIAL EPICONDYLITIS, RIGHT ELBOW: ICD-10-CM

## 2021-05-20 ENCOUNTER — APPOINTMENT (OUTPATIENT)
Dept: UROLOGY | Facility: CLINIC | Age: 56
End: 2021-05-20
Payer: MEDICARE

## 2021-05-20 VITALS — SYSTOLIC BLOOD PRESSURE: 116 MMHG | HEART RATE: 92 BPM | TEMPERATURE: 98.5 F | DIASTOLIC BLOOD PRESSURE: 81 MMHG

## 2021-05-20 PROCEDURE — 99212 OFFICE O/P EST SF 10 MIN: CPT

## 2021-05-20 PROCEDURE — 99072 ADDL SUPL MATRL&STAF TM PHE: CPT

## 2021-05-20 RX ORDER — BACILLUS COAGULANS 1B CELL
CAPSULE ORAL
Qty: 90 | Refills: 0 | Status: DISCONTINUED | COMMUNITY
Start: 2020-12-22 | End: 2021-05-20

## 2021-05-20 NOTE — HISTORY OF PRESENT ILLNESS
[FreeTextEntry1] : patient has been taking tadalafil as needed. works well. no dyspepsia or headache. no lightheadedness. no other complaints.

## 2021-05-20 NOTE — ASSESSMENT
[FreeTextEntry1] : Impression:\par ED\par \par Plan:\par \par continue tadalfil\par follow up in one year.

## 2021-05-20 NOTE — PHYSICAL EXAM

## 2021-05-20 NOTE — LETTER BODY
[Dear  ___] : Dear  [unfilled], [Courtesy Letter:] : I had the pleasure of seeing your patient, [unfilled], in my office today. [Please see my note below.] : Please see my note below. [Sincerely,] : Sincerely, [FreeTextEntry3] : Ed\par \par Abraham Lee MD\par [facility]\par  of Urology\par [employer] at [facility]\par

## 2021-06-02 ENCOUNTER — APPOINTMENT (OUTPATIENT)
Dept: UROLOGY | Facility: CLINIC | Age: 56
End: 2021-06-02
Payer: MEDICARE

## 2021-06-02 VITALS
RESPIRATION RATE: 18 BRPM | HEART RATE: 95 BPM | DIASTOLIC BLOOD PRESSURE: 83 MMHG | SYSTOLIC BLOOD PRESSURE: 117 MMHG | TEMPERATURE: 97.8 F

## 2021-06-02 LAB
BILIRUB UR QL STRIP: NORMAL
CLARITY UR: CLEAR
COLLECTION METHOD: NORMAL
GLUCOSE UR-MCNC: NORMAL
HCG UR QL: 0.2 EU/DL
HGB UR QL STRIP.AUTO: ABNORMAL
KETONES UR-MCNC: NORMAL
LEUKOCYTE ESTERASE UR QL STRIP: ABNORMAL
NITRITE UR QL STRIP: NORMAL
PH UR STRIP: 7
PROT UR STRIP-MCNC: NORMAL
SP GR UR STRIP: 1.01

## 2021-06-02 PROCEDURE — 99213 OFFICE O/P EST LOW 20 MIN: CPT

## 2021-06-02 PROCEDURE — 99072 ADDL SUPL MATRL&STAF TM PHE: CPT

## 2021-06-02 NOTE — HISTORY OF PRESENT ILLNESS
[Urinary Urgency] : urinary urgency [Urinary Frequency] : urinary frequency [Weak Stream] : weak stream [FreeTextEntry1] : Was seen by Dr. Sandoval ([facility]) 5/20/21 for c/o dysuria, was treated for UTI. At present  c/o frequency, urgency, weak stream. Denies hematuria, fever, chills.\par In motorized wheelchair, paraplegic

## 2021-06-02 NOTE — ASSESSMENT
[FreeTextEntry1] : Abnormal urine\par \par Urine dip> small blood, trace leukocytes\par Lab: culture\par RTO as scheduled or sooner if no resolution in symptoms\par

## 2021-06-02 NOTE — PHYSICAL EXAM
[General Appearance - Well Developed] : well developed [General Appearance - Well Nourished] : well nourished [Normal Appearance] : normal appearance [Well Groomed] : well groomed [General Appearance - In No Acute Distress] : no acute distress [Skin Color & Pigmentation] : normal skin color and pigmentation [] : no respiratory distress [Exaggerated Use Of Accessory Muscles For Inspiration] : no accessory muscle use [Oriented To Time, Place, And Person] : oriented to person, place, and time [Affect] : the affect was normal [Mood] : the mood was normal [Not Anxious] : not anxious [FreeTextEntry1] : wheelchair dependent

## 2021-06-07 LAB — BACTERIA UR CULT: NORMAL

## 2021-06-10 RX ORDER — CEPHALEXIN 500 MG/1
500 CAPSULE ORAL
Qty: 10 | Refills: 0 | Status: DISCONTINUED | COMMUNITY
Start: 2021-06-02 | End: 2021-06-10

## 2021-06-21 ENCOUNTER — APPOINTMENT (OUTPATIENT)
Dept: UROLOGY | Facility: CLINIC | Age: 56
End: 2021-06-21
Payer: MEDICARE

## 2021-06-21 VITALS — TEMPERATURE: 98.5 F

## 2021-06-21 PROCEDURE — 99072 ADDL SUPL MATRL&STAF TM PHE: CPT

## 2021-06-21 PROCEDURE — 99213 OFFICE O/P EST LOW 20 MIN: CPT

## 2021-06-21 NOTE — ASSESSMENT
[FreeTextEntry1] : Impression:\par \par UTI, essentially resolved. \par ED\par \par Plan:\par \par continue tadalafil\par followup in 6 months. sooner if problems develop.

## 2021-06-21 NOTE — PHYSICAL EXAM
[General Appearance - Well Developed] : well developed [General Appearance - Well Nourished] : well nourished [Normal Appearance] : normal appearance [Well Groomed] : well groomed [General Appearance - In No Acute Distress] : no acute distress [] : no rash [Oriented To Time, Place, And Person] : oriented to person, place, and time [Affect] : the affect was normal [Mood] : the mood was normal [Not Anxious] : not anxious [FreeTextEntry1] : wn a wheelchair.

## 2021-06-21 NOTE — HISTORY OF PRESENT ILLNESS
[FreeTextEntry1] : Finished a course of cephalexin. no dysuria.  no dysuria.  no urgency.  no frequency.  Tadalafil works well.

## 2021-06-21 NOTE — LETTER BODY
[Dear  ___] : Dear  [unfilled], [Courtesy Letter:] : I had the pleasure of seeing your patient, [unfilled], in my office today. [Please see my note below.] : Please see my note below. [Sincerely,] : Sincerely, [FreeTextEntry3] : Ed\par \par Abraham Lee MD\par [facility]\par  of Urology\par [employer] at [facility]\par

## 2021-06-23 ENCOUNTER — APPOINTMENT (OUTPATIENT)
Dept: FAMILY MEDICINE | Facility: CLINIC | Age: 56
End: 2021-06-23
Payer: MEDICARE

## 2021-06-23 VITALS
OXYGEN SATURATION: 98 % | SYSTOLIC BLOOD PRESSURE: 130 MMHG | TEMPERATURE: 98 F | HEIGHT: 72 IN | DIASTOLIC BLOOD PRESSURE: 80 MMHG | HEART RATE: 97 BPM

## 2021-06-23 VITALS — SYSTOLIC BLOOD PRESSURE: 124 MMHG | DIASTOLIC BLOOD PRESSURE: 82 MMHG

## 2021-06-23 DIAGNOSIS — M54.5 LOW BACK PAIN: ICD-10-CM

## 2021-06-23 DIAGNOSIS — G89.29 LOW BACK PAIN: ICD-10-CM

## 2021-06-23 PROCEDURE — 99213 OFFICE O/P EST LOW 20 MIN: CPT

## 2021-06-23 PROCEDURE — 99072 ADDL SUPL MATRL&STAF TM PHE: CPT
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
## 2021-06-23 NOTE — PHYSICAL EXAM
[Normal] : no posterior cervical lymphadenopathy and no anterior cervical lymphadenopathy [No CVA Tenderness] : no CVA  tenderness [No Spinal Tenderness] : no spinal tenderness [de-identified] : wheelchair bound
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
## 2021-06-29 ENCOUNTER — RX RENEWAL (OUTPATIENT)
Age: 56
End: 2021-06-29

## 2021-07-01 ENCOUNTER — APPOINTMENT (OUTPATIENT)
Dept: UROLOGY | Facility: CLINIC | Age: 56
End: 2021-07-01

## 2021-07-16 ENCOUNTER — APPOINTMENT (OUTPATIENT)
Dept: FAMILY MEDICINE | Facility: CLINIC | Age: 56
End: 2021-07-16

## 2021-08-02 ENCOUNTER — APPOINTMENT (OUTPATIENT)
Dept: FAMILY MEDICINE | Facility: CLINIC | Age: 56
End: 2021-08-02
Payer: MEDICARE

## 2021-08-02 ENCOUNTER — NON-APPOINTMENT (OUTPATIENT)
Age: 56
End: 2021-08-02

## 2021-08-02 VITALS
WEIGHT: 215 LBS | HEIGHT: 72 IN | OXYGEN SATURATION: 94 % | SYSTOLIC BLOOD PRESSURE: 122 MMHG | BODY MASS INDEX: 29.12 KG/M2 | HEART RATE: 80 BPM | TEMPERATURE: 97.7 F | DIASTOLIC BLOOD PRESSURE: 82 MMHG

## 2021-08-02 DIAGNOSIS — M13.0 POLYARTHRITIS, UNSPECIFIED: ICD-10-CM

## 2021-08-02 PROCEDURE — 99213 OFFICE O/P EST LOW 20 MIN: CPT

## 2021-08-02 NOTE — PHYSICAL EXAM
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 no swelling/erythema, mild tenderness in PIP joints of right hand, sensation intact [de-identified] : see msk normal...

## 2021-08-02 NOTE — HISTORY OF PRESENT ILLNESS
[FreeTextEntry8] : 57 yo male presents with complaint of pain and stiffness in b/l hands. He says he feels pain in the joints of his fingers. Pain is 7/10 in severity, varies, intermittent, dull, began 2 months ago. Denies fever, chills, cp, palpitations, sob, nv, heat/cold intolerance, dizziness, melena, hematochezia, muscle weakness, loss of sensation, bowel/bladder incontinence or calf pain.\par

## 2021-08-02 NOTE — ASSESSMENT
[FreeTextEntry1] : B/l hand pain: mild tenderness in right hand PIP joints, f/u xray, f/u labwork, c/w tylenol prn for pain\par RTC 2 wks

## 2021-08-05 ENCOUNTER — RX RENEWAL (OUTPATIENT)
Age: 56
End: 2021-08-05

## 2021-08-09 LAB
ALBUMIN SERPL ELPH-MCNC: 4.6 G/DL
ALP BLD-CCNC: 52 U/L
ALT SERPL-CCNC: 12 U/L
ANA SER IF-ACNC: NEGATIVE
ANION GAP SERPL CALC-SCNC: 14 MMOL/L
APPEARANCE: CLEAR
AST SERPL-CCNC: 15 U/L
BACTERIA: NEGATIVE
BASOPHILS # BLD AUTO: 0.08 K/UL
BASOPHILS NFR BLD AUTO: 1.4 %
BILIRUB SERPL-MCNC: 0.2 MG/DL
BILIRUBIN URINE: NEGATIVE
BLOOD URINE: NEGATIVE
BUN SERPL-MCNC: 21 MG/DL
CALCIUM SERPL-MCNC: 9.8 MG/DL
CCP AB SER IA-ACNC: <8 UNITS
CHLORIDE SERPL-SCNC: 104 MMOL/L
CO2 SERPL-SCNC: 24 MMOL/L
COLOR: YELLOW
CREAT SERPL-MCNC: 0.82 MG/DL
EOSINOPHIL # BLD AUTO: 0.07 K/UL
EOSINOPHIL NFR BLD AUTO: 1.2 %
ERYTHROCYTE [SEDIMENTATION RATE] IN BLOOD BY WESTERGREN METHOD: 9 MM/HR
GLUCOSE QUALITATIVE U: NEGATIVE
GLUCOSE SERPL-MCNC: 107 MG/DL
HBV CORE IGG+IGM SER QL: NONREACTIVE
HBV CORE IGM SER QL: NONREACTIVE
HBV SURFACE AB SER QL: NONREACTIVE
HBV SURFACE AG SER QL: NONREACTIVE
HCT VFR BLD CALC: 46.2 %
HCV AB SER QL: NONREACTIVE
HCV S/CO RATIO: 0.11 S/CO
HGB BLD-MCNC: 15 G/DL
HYALINE CASTS: 1 /LPF
IMM GRANULOCYTES NFR BLD AUTO: 0.2 %
KETONES URINE: NEGATIVE
LEUKOCYTE ESTERASE URINE: NEGATIVE
LYMPHOCYTES # BLD AUTO: 1.3 K/UL
LYMPHOCYTES NFR BLD AUTO: 22.5 %
MAN DIFF?: NORMAL
MCHC RBC-ENTMCNC: 29.6 PG
MCHC RBC-ENTMCNC: 32.5 GM/DL
MCV RBC AUTO: 91.1 FL
MICROSCOPIC-UA: NORMAL
MONOCYTES # BLD AUTO: 0.53 K/UL
MONOCYTES NFR BLD AUTO: 9.2 %
NEUTROPHILS # BLD AUTO: 3.78 K/UL
NEUTROPHILS NFR BLD AUTO: 65.5 %
NITRITE URINE: NEGATIVE
PH URINE: 6.5
PLATELET # BLD AUTO: 195 K/UL
POTASSIUM SERPL-SCNC: 4.2 MMOL/L
PROT SERPL-MCNC: 6.3 G/DL
PROTEIN URINE: NEGATIVE
RBC # BLD: 5.07 M/UL
RBC # FLD: 14.6 %
RED BLOOD CELLS URINE: 2 /HPF
RF+CCP IGG SER-IMP: NEGATIVE
RHEUMATOID FACT SER QL: 74 IU/ML
SODIUM SERPL-SCNC: 143 MMOL/L
SPECIFIC GRAVITY URINE: 1.02
SQUAMOUS EPITHELIAL CELLS: 2 /HPF
TSH SERPL-ACNC: 1 UIU/ML
UROBILINOGEN URINE: NORMAL
WBC # FLD AUTO: 5.77 K/UL
WHITE BLOOD CELLS URINE: 1 /HPF

## 2021-08-13 ENCOUNTER — APPOINTMENT (OUTPATIENT)
Dept: FAMILY MEDICINE | Facility: CLINIC | Age: 56
End: 2021-08-13
Payer: MEDICARE

## 2021-08-13 PROCEDURE — 90471 IMMUNIZATION ADMIN: CPT

## 2021-08-13 PROCEDURE — 90746 HEPB VACCINE 3 DOSE ADULT IM: CPT

## 2021-08-19 DIAGNOSIS — M19.049 PRIMARY OSTEOARTHRITIS, UNSPECIFIED HAND: ICD-10-CM

## 2021-08-26 ENCOUNTER — RX RENEWAL (OUTPATIENT)
Age: 56
End: 2021-08-26

## 2021-09-07 ENCOUNTER — RX RENEWAL (OUTPATIENT)
Age: 56
End: 2021-09-07

## 2021-09-08 ENCOUNTER — RX RENEWAL (OUTPATIENT)
Age: 56
End: 2021-09-08

## 2021-09-09 ENCOUNTER — RX RENEWAL (OUTPATIENT)
Age: 56
End: 2021-09-09

## 2021-09-13 ENCOUNTER — RX RENEWAL (OUTPATIENT)
Age: 56
End: 2021-09-13

## 2021-09-14 ENCOUNTER — RX RENEWAL (OUTPATIENT)
Age: 56
End: 2021-09-14

## 2021-09-16 PROBLEM — M19.049 CMC ARTHRITIS: Status: ACTIVE | Noted: 2021-09-16

## 2021-09-29 ENCOUNTER — RX RENEWAL (OUTPATIENT)
Age: 56
End: 2021-09-29

## 2021-10-11 ENCOUNTER — APPOINTMENT (OUTPATIENT)
Dept: PHYSICAL MEDICINE AND REHAB | Facility: CLINIC | Age: 56
End: 2021-10-11

## 2021-10-28 ENCOUNTER — RX RENEWAL (OUTPATIENT)
Age: 56
End: 2021-10-28

## 2021-10-29 ENCOUNTER — APPOINTMENT (OUTPATIENT)
Dept: PHYSICAL MEDICINE AND REHAB | Facility: CLINIC | Age: 56
End: 2021-10-29
Payer: MEDICARE

## 2021-10-29 VITALS
RESPIRATION RATE: 14 BRPM | SYSTOLIC BLOOD PRESSURE: 115 MMHG | WEIGHT: 215 LBS | DIASTOLIC BLOOD PRESSURE: 75 MMHG | HEART RATE: 69 BPM | BODY MASS INDEX: 29.12 KG/M2 | HEIGHT: 72 IN

## 2021-10-29 DIAGNOSIS — Z78.9 OTHER SPECIFIED HEALTH STATUS: ICD-10-CM

## 2021-10-29 DIAGNOSIS — Z86.39 PERSONAL HISTORY OF OTHER ENDOCRINE, NUTRITIONAL AND METABOLIC DISEASE: ICD-10-CM

## 2021-10-29 DIAGNOSIS — Z87.820 PERSONAL HISTORY OF TRAUMATIC BRAIN INJURY: ICD-10-CM

## 2021-10-29 PROCEDURE — 99204 OFFICE O/P NEW MOD 45 MIN: CPT | Mod: GC

## 2021-10-29 NOTE — ASSESSMENT
[FreeTextEntry1] : Mr. MOOKIE PIPER is a 56 year old male with a hx of TBI with resultant paraplegia who presents with low back pain. His back pain is most consistent with myofascial pain. Denies any red flag signs. MRI L Spine reviewed with patient. \par Will recommend:\par - MRI L Spine reviewed. Medicine and Neurology notes reviewed. \par - PT 2-3x/week for low back for stretching, strengthening (especially of core muscles), ROM exercises, HEP and modalities PRN including myofascial release, moist heat, and TENS therapy \par - Patient requesting names of Neurosurgeons for baclofen pump placement consultation, for which he was provided with a few names. \par - Patient to follow up with Dr. London as indicated\par \par RTC 4-6 weeks to reassess pain. Patient aware of red flag signs including any changes to their bowel/bladder control, groin numbness or new weakness. Patient knows to seek immediate attention by calling 911 or going to nearest ER if these symptoms appear.
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
## 2021-10-29 NOTE — DATA REVIEWED
[FreeTextEntry1] : MRI L SPine 21  radiology reviewed personally by me: no areas of severe canal stenosis, mild lumbar spondylosis\par \par PATIENT NAME: Duncan Cleveland\par PATIENT PHONE NUMBER:\par PATIENT ID: [id number]\par : 1965\par DATE OF EXAM: 2021\par R. Phys. Name: Andres Mays\par R. Phys. Address: [address] [place] [address], [address], [place], [facility]\par R. Phys. Phone: [phone]\par MRI-3T LUMBAR SPINE NON CONTRAST\par \par HISTORY: M56.5 Lower Back Pain\par \par TECHNIQUE: MR imaging of the lumbar spine was performed without contrast on a\par 3.0 Maria high-field wide-bore magnet.\par \par COMPARISON: MRI lumbar spine dated/10/2019\par \par FINDINGS:\par \par SEGMENTATION: Normal.\par \par ALIGNMENT: There is mild thoracic dextroscoliosis, partially imaged.\par \par BONES: Vertebral body heights are maintained. Marrow signal is within normal\par limits.\par \par CONUS: Normal in signal and morphology.\par \par CANAL: No congenital narrowing of the spinal canal.\par \par DISCS: See details below.\par \par L1-L2: There is no disc bulge, herniation, thecal sac compression or foraminal\par narrowing.\par \par L2-L3: There is no disc bulge, herniation, thecal sac compression or foraminal\par narrowing.\par \par L3-L4: There is no disc bulge, herniation, thecal sac compression or foraminal\par narrowing.\par \par L4-L5: There is a disc bulge and central annular fissure impressing the thecal\par sac. There is no significant stenosis.\par \par L5-S1: There is a central disc herniation/annular fissure impressing upon the\par thecal sac. There is no significant stenosis.\par \par The visualized abdominal and pelvic structures are unremarkable.\par \par IMPRESSION:\par \par \par No significant change since 2019. There is no high-grade stenosis at any\par lumbar level.\par \par L4-L5: There is a disc bulge and central annular fissure impressing the thecal\par sac. There is no significant stenosis.\par \par L5-S1: There is a central disc herniation/annular fissure impressing upon the\par thecal sac. There is no significant stenosis.\par \par Signed by: Hans Mccarthy MD\par Signed Date: 2021 2:47 PM EDT\par \par \par \par SIGNED BY: Hans Mccarthy M.D., Ext. [phone] 02:47 PM

## 2021-10-29 NOTE — PHYSICAL EXAM
[FreeTextEntry1] : Constitutional: In NAD, calm and cooperative, sitting in powered wheelchair\par HEENT: NCAT, Anicteric sclera, no lid-lag\par Cardio: Extremities appear pink and well perfused, no peripheral edema\par Respiratory: Normal respiratory effort on room air, no accessory muscle use\par Skin: no rashes seen on exposed skin, no visible abrasions\par Psych: Normal affect, intact judgment and insight\par Neuro: Awake, alert and oriented x 3, see below for focused neurological exam\par \par MSK (Back)\par                 Inspection: no gross swelling identified\par                 Palpation: Tenderness of the left lower upper lumbar paraspinals\par                 ROM: IMPROVED pain at end lumbar extension, but worsened with flexion.\par                 Strength: 5/5 strength in bilateral lower extremities\par                 Reflexes: 2+ Patella reflex bilaterally, 1+ Achilles reflex bilaterally, negative clonus bilaterally\par                 Sensation: Intact to light touch in bilateral lower extremities\par                 Special tests: Modified SLR: negative bilaterally\par

## 2021-11-04 ENCOUNTER — RX RENEWAL (OUTPATIENT)
Age: 56
End: 2021-11-04

## 2021-11-30 ENCOUNTER — RX RENEWAL (OUTPATIENT)
Age: 56
End: 2021-11-30

## 2021-12-04 ENCOUNTER — RX RENEWAL (OUTPATIENT)
Age: 56
End: 2021-12-04

## 2021-12-07 ENCOUNTER — RX RENEWAL (OUTPATIENT)
Age: 56
End: 2021-12-07

## 2021-12-09 ENCOUNTER — APPOINTMENT (OUTPATIENT)
Dept: UROLOGY | Facility: CLINIC | Age: 56
End: 2021-12-09

## 2021-12-10 ENCOUNTER — APPOINTMENT (OUTPATIENT)
Dept: PHYSICAL MEDICINE AND REHAB | Facility: CLINIC | Age: 56
End: 2021-12-10

## 2021-12-22 ENCOUNTER — APPOINTMENT (OUTPATIENT)
Dept: UROLOGY | Facility: CLINIC | Age: 56
End: 2021-12-22

## 2021-12-23 ENCOUNTER — RX RENEWAL (OUTPATIENT)
Age: 56
End: 2021-12-23

## 2022-01-05 ENCOUNTER — APPOINTMENT (OUTPATIENT)
Dept: PHYSICAL MEDICINE AND REHAB | Facility: CLINIC | Age: 57
End: 2022-01-05
Payer: MEDICARE

## 2022-01-05 PROCEDURE — 99213 OFFICE O/P EST LOW 20 MIN: CPT | Mod: GC

## 2022-01-09 NOTE — PHYSICAL EXAM
[FreeTextEntry1] : Constitutional: In NAD, calm and cooperative, sitting in powered wheelchair\par HEENT: NCAT, Anicteric sclera, no lid-lag\par Cardio: Extremities appear pink and well perfused, no peripheral edema\par Respiratory: Normal respiratory effort on room air, no accessory muscle use\par Skin: no rashes seen on exposed skin, no visible abrasions\par Psych: Normal affect, intact judgment and insight\par Neuro: Awake, alert and oriented x 3, see below for focused neurological exam\par \par MSK (Back)\par                 Inspection: no gross swelling identified, b/l AFOs\par                 Palpation: Tenderness of the left lower upper lumbar paraspinals\par                 ROM: IMPROVED pain at end lumbar extension, but worsened with flexion.\par                 Strength: 5/5 strength in bilateral lower extremities\par                 Reflexes: 2+ Patella reflex bilaterally, 1+ Achilles reflex bilaterally, negative clonus bilaterally\par                 Sensation: Intact to light touch in bilateral lower extremities\par                 Special tests: Modified SLR: negative bilaterally\par
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
## 2022-01-10 ENCOUNTER — APPOINTMENT (OUTPATIENT)
Dept: UROLOGY | Facility: CLINIC | Age: 57
End: 2022-01-10

## 2022-01-20 ENCOUNTER — APPOINTMENT (OUTPATIENT)
Dept: UROLOGY | Facility: CLINIC | Age: 57
End: 2022-01-20
Payer: MEDICARE

## 2022-01-20 ENCOUNTER — RESULT CHARGE (OUTPATIENT)
Age: 57
End: 2022-01-20

## 2022-01-20 LAB
BILIRUB UR QL STRIP: NORMAL
CLARITY UR: CLEAR
COLLECTION METHOD: NORMAL
GLUCOSE UR-MCNC: NORMAL
HCG UR QL: 0.2 EU/DL
HGB UR QL STRIP.AUTO: NORMAL
KETONES UR-MCNC: NORMAL
LEUKOCYTE ESTERASE UR QL STRIP: NORMAL
NITRITE UR QL STRIP: NORMAL
PH UR STRIP: 6
PROT UR STRIP-MCNC: ABNORMAL
SP GR UR STRIP: 1.02

## 2022-01-20 PROCEDURE — 99213 OFFICE O/P EST LOW 20 MIN: CPT

## 2022-01-20 NOTE — ASSESSMENT
[FreeTextEntry1] : dsyruia\par ED\par \par Paln:'\par urine culture\par tadlafil script\par \par Call on Monday

## 2022-01-20 NOTE — HISTORY OF PRESENT ILLNESS
[FreeTextEntry1] : Patient has urgency and feels he may have a UTI>  Has dysuria.  He voiding frequently. has erection issues.  could not afford meds last visit.

## 2022-01-20 NOTE — LETTER BODY
[Dear  ___] : Dear  [unfilled], [Courtesy Letter:] : I had the pleasure of seeing your patient, [unfilled], in my office today. [Please see my note below.] : Please see my note below. [Sincerely,] : Sincerely, [FreeTextEntry3] : Ed\par \par Abraham Lee MD\par [facility]\par  of Urology\par [employer] at [facility]\par

## 2022-01-23 LAB — BACTERIA UR CULT: NORMAL

## 2022-01-24 ENCOUNTER — APPOINTMENT (OUTPATIENT)
Dept: FAMILY MEDICINE | Facility: CLINIC | Age: 57
End: 2022-01-24

## 2022-02-09 ENCOUNTER — APPOINTMENT (OUTPATIENT)
Dept: FAMILY MEDICINE | Facility: CLINIC | Age: 57
End: 2022-02-09
Payer: MEDICARE

## 2022-02-09 VITALS
HEIGHT: 72 IN | TEMPERATURE: 98 F | HEART RATE: 71 BPM | SYSTOLIC BLOOD PRESSURE: 130 MMHG | OXYGEN SATURATION: 98 % | DIASTOLIC BLOOD PRESSURE: 80 MMHG | BODY MASS INDEX: 29.12 KG/M2 | WEIGHT: 215 LBS

## 2022-02-09 DIAGNOSIS — B35.3 TINEA PEDIS: ICD-10-CM

## 2022-02-09 DIAGNOSIS — G47.00 INSOMNIA, UNSPECIFIED: ICD-10-CM

## 2022-02-09 PROCEDURE — 99214 OFFICE O/P EST MOD 30 MIN: CPT

## 2022-02-09 NOTE — PHYSICAL EXAM
[Normal] : affect was normal and insight and judgment were intact [de-identified] : white skin changes b/w toes

## 2022-02-09 NOTE — HISTORY OF PRESENT ILLNESS
[FreeTextEntry1] : foot rash [de-identified] : 57 yo male presents with white skin changes b/w toes on right foot. In addition, pt reports difficulty sleeping at night. Denies fever, chills, cp, palpitations, sob, nv, heat/cold intolerance, dizziness, melena, hematochezia, muscle weakness, loss of sensation, bowel/bladder incontinence or calf pain.\par

## 2022-02-09 NOTE — ASSESSMENT
[FreeTextEntry1] : Tinea pedis: start terbinafine as prescribed x 10 days\par Insomnia: discussed sleep hygiene guidelines\par RTC 2 wks

## 2022-02-14 ENCOUNTER — APPOINTMENT (OUTPATIENT)
Dept: PULMONOLOGY | Facility: CLINIC | Age: 57
End: 2022-02-14

## 2022-02-15 ENCOUNTER — APPOINTMENT (OUTPATIENT)
Dept: PHYSICAL MEDICINE AND REHAB | Facility: CLINIC | Age: 57
End: 2022-02-15

## 2022-02-21 ENCOUNTER — RX RENEWAL (OUTPATIENT)
Age: 57
End: 2022-02-21

## 2022-02-23 ENCOUNTER — RX RENEWAL (OUTPATIENT)
Age: 57
End: 2022-02-23

## 2022-02-28 ENCOUNTER — RX RENEWAL (OUTPATIENT)
Age: 57
End: 2022-02-28

## 2022-03-02 ENCOUNTER — RX RENEWAL (OUTPATIENT)
Age: 57
End: 2022-03-02

## 2022-03-07 ENCOUNTER — RX RENEWAL (OUTPATIENT)
Age: 57
End: 2022-03-07

## 2022-03-09 ENCOUNTER — NON-APPOINTMENT (OUTPATIENT)
Age: 57
End: 2022-03-09

## 2022-03-09 ENCOUNTER — APPOINTMENT (OUTPATIENT)
Dept: NEUROSURGERY | Facility: CLINIC | Age: 57
End: 2022-03-09

## 2022-03-09 VITALS
DIASTOLIC BLOOD PRESSURE: 80 MMHG | WEIGHT: 235 LBS | HEART RATE: 87 BPM | BODY MASS INDEX: 31.83 KG/M2 | HEIGHT: 72 IN | OXYGEN SATURATION: 97 % | SYSTOLIC BLOOD PRESSURE: 129 MMHG | TEMPERATURE: 98.5 F

## 2022-03-11 ENCOUNTER — APPOINTMENT (OUTPATIENT)
Dept: PHYSICAL MEDICINE AND REHAB | Facility: CLINIC | Age: 57
End: 2022-03-11
Payer: MEDICARE

## 2022-03-11 VITALS
OXYGEN SATURATION: 92 % | DIASTOLIC BLOOD PRESSURE: 89 MMHG | HEIGHT: 72 IN | RESPIRATION RATE: 14 BRPM | WEIGHT: 235 LBS | SYSTOLIC BLOOD PRESSURE: 138 MMHG | HEART RATE: 80 BPM | BODY MASS INDEX: 31.83 KG/M2

## 2022-03-11 PROCEDURE — 99213 OFFICE O/P EST LOW 20 MIN: CPT

## 2022-03-11 NOTE — PHYSICAL EXAM
[FreeTextEntry1] : Constitutional: In NAD, calm and cooperative, sitting in powered wheelchair\par MSK (Back)\par                 Inspection: no gross swelling identified, b/l AFOs\par                 Palpation: Minimal enderness of the left lower upper lumbar paraspinals\par                 ROM: IMPROVED pain at end lumbar extension, but worsened with flexion.\par                 Strength: 5/5 strength in bilateral lower extremities\par                 Reflexes: Reflexes symmetrical bilaterally\par                 Sensation: Intact to light touch in bilateral lower extremities\par                 Special tests: Modified SLR: negative bilaterally\par
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
## 2022-03-15 ENCOUNTER — APPOINTMENT (OUTPATIENT)
Dept: UROLOGY | Facility: CLINIC | Age: 57
End: 2022-03-15

## 2022-03-20 ENCOUNTER — RX RENEWAL (OUTPATIENT)
Age: 57
End: 2022-03-20

## 2022-04-05 ENCOUNTER — APPOINTMENT (OUTPATIENT)
Dept: NEUROSURGERY | Facility: CLINIC | Age: 57
End: 2022-04-05
Payer: MEDICARE

## 2022-04-12 ENCOUNTER — APPOINTMENT (OUTPATIENT)
Dept: NEUROSURGERY | Facility: CLINIC | Age: 57
End: 2022-04-12
Payer: MEDICARE

## 2022-04-12 VITALS
OXYGEN SATURATION: 96 % | SYSTOLIC BLOOD PRESSURE: 131 MMHG | BODY MASS INDEX: 31.83 KG/M2 | HEART RATE: 73 BPM | WEIGHT: 235 LBS | DIASTOLIC BLOOD PRESSURE: 84 MMHG | HEIGHT: 72 IN

## 2022-04-12 DIAGNOSIS — Z87.891 PERSONAL HISTORY OF NICOTINE DEPENDENCE: ICD-10-CM

## 2022-04-12 PROCEDURE — 99204 OFFICE O/P NEW MOD 45 MIN: CPT

## 2022-04-12 RX ORDER — ROPINIROLE 1 MG/1
1 TABLET, FILM COATED ORAL
Qty: 90 | Refills: 3 | Status: DISCONTINUED | COMMUNITY
Start: 2021-08-11 | End: 2022-04-12

## 2022-04-12 RX ORDER — MELATONIN 5 MG
5 CAPSULE ORAL
Qty: 30 | Refills: 1 | Status: DISCONTINUED | COMMUNITY
Start: 2021-09-01 | End: 2022-04-12

## 2022-04-12 RX ORDER — ACETAMINOPHEN 500 MG/1
500 TABLET, COATED ORAL EVERY 6 HOURS
Qty: 100 | Refills: 0 | Status: DISCONTINUED | COMMUNITY
Start: 2021-11-30 | End: 2022-04-12

## 2022-04-19 ENCOUNTER — APPOINTMENT (OUTPATIENT)
Dept: PHYSICAL MEDICINE AND REHAB | Facility: CLINIC | Age: 57
End: 2022-04-19

## 2022-04-22 ENCOUNTER — APPOINTMENT (OUTPATIENT)
Dept: PHYSICAL MEDICINE AND REHAB | Facility: CLINIC | Age: 57
End: 2022-04-22

## 2022-04-23 NOTE — REASON FOR VISIT
[Referred By: _________] : Patient was referred by ELISA [FreeTextEntry1] : ITP Baclofen trial consult

## 2022-04-23 NOTE — REVIEW OF SYSTEMS
[Inability to Walk] : inability to walk [Incontinence] : incontinence [As Noted in HPI] : as noted in HPI [Negative] : Respiratory [Abdominal Pain] : no abdominal pain [Vomiting] : no vomiting [Diarrhea] : no diarrhea [Skin Lesions] : no skin lesions [Easy Bleeding] : no tendency for easy bleeding [Easy Bruising] : no tendency for easy bruising [de-identified] : paraplegic [FreeTextEntry8] : paraplegia [FreeTextEntry9] : spasticity BLE
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
## 2022-04-23 NOTE — PHYSICAL EXAM
[General Appearance - Alert] : alert [General Appearance - In No Acute Distress] : in no acute distress [Oriented To Time, Place, And Person] : oriented to person, place, and time [1] : L2 Iliopsoas 1/5 [5] : C7 triceps 5/5 [0] : L2 Iliopsoas 0/5 [Sclera] : the sclera and conjunctiva were normal [Hearing Threshold Finger Rub Not [place]] : hearing was normal [Neck Appearance] : the appearance of the neck was normal [] : no respiratory distress [Respiration, Rhythm And Depth] : normal respiratory rhythm and effort [Involuntary Movements] : no involuntary movements were seen [Skin Color & Pigmentation] : normal skin color and pigmentation [FreeTextEntry1] : Paraplegic, RLE hip flex 1/5, LLE trace hip flex - non ambulatory

## 2022-05-02 ENCOUNTER — APPOINTMENT (OUTPATIENT)
Dept: PHYSICAL MEDICINE AND REHAB | Facility: CLINIC | Age: 57
End: 2022-05-02

## 2022-05-11 ENCOUNTER — APPOINTMENT (OUTPATIENT)
Dept: PHYSICAL MEDICINE AND REHAB | Facility: CLINIC | Age: 57
End: 2022-05-11

## 2022-05-11 ENCOUNTER — APPOINTMENT (OUTPATIENT)
Dept: PHYSICAL MEDICINE AND REHAB | Facility: CLINIC | Age: 57
End: 2022-05-11
Payer: MEDICARE

## 2022-05-11 DIAGNOSIS — M79.18 MYALGIA, OTHER SITE: ICD-10-CM

## 2022-05-11 DIAGNOSIS — M47.816 SPONDYLOSIS W/OUT MYELOPATHY OR RADICULOPATHY, LUMBAR REGION: ICD-10-CM

## 2022-05-11 PROCEDURE — 99213 OFFICE O/P EST LOW 20 MIN: CPT | Mod: 95
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
## 2022-05-12 ENCOUNTER — RX RENEWAL (OUTPATIENT)
Age: 57
End: 2022-05-12

## 2022-05-17 ENCOUNTER — RX RENEWAL (OUTPATIENT)
Age: 57
End: 2022-05-17

## 2022-05-20 ENCOUNTER — OUTPATIENT (OUTPATIENT)
Dept: OUTPATIENT SERVICES | Facility: HOSPITAL | Age: 57
LOS: 1 days | End: 2022-05-20
Payer: MEDICARE

## 2022-05-20 ENCOUNTER — RX RENEWAL (OUTPATIENT)
Age: 57
End: 2022-05-20

## 2022-05-20 VITALS
DIASTOLIC BLOOD PRESSURE: 88 MMHG | HEART RATE: 76 BPM | WEIGHT: 214.95 LBS | HEIGHT: 72 IN | RESPIRATION RATE: 16 BRPM | TEMPERATURE: 98 F | SYSTOLIC BLOOD PRESSURE: 128 MMHG | OXYGEN SATURATION: 96 %

## 2022-05-20 DIAGNOSIS — Z98.890 OTHER SPECIFIED POSTPROCEDURAL STATES: Chronic | ICD-10-CM

## 2022-05-20 DIAGNOSIS — Z01.818 ENCOUNTER FOR OTHER PREPROCEDURAL EXAMINATION: ICD-10-CM

## 2022-05-20 DIAGNOSIS — R25.2 CRAMP AND SPASM: ICD-10-CM

## 2022-05-20 DIAGNOSIS — Z89.022 ACQUIRED ABSENCE OF LEFT FINGER(S): Chronic | ICD-10-CM

## 2022-05-20 DIAGNOSIS — G47.33 OBSTRUCTIVE SLEEP APNEA (ADULT) (PEDIATRIC): ICD-10-CM

## 2022-05-20 DIAGNOSIS — S06.9X9A UNSPECIFIED INTRACRANIAL INJURY WITH LOSS OF CONSCIOUSNESS OF UNSPECIFIED DURATION, INITIAL ENCOUNTER: Chronic | ICD-10-CM

## 2022-05-20 LAB
ANION GAP SERPL CALC-SCNC: 13 MMOL/L — SIGNIFICANT CHANGE UP (ref 5–17)
BUN SERPL-MCNC: 16 MG/DL — SIGNIFICANT CHANGE UP (ref 7–23)
CALCIUM SERPL-MCNC: 9.5 MG/DL — SIGNIFICANT CHANGE UP (ref 8.4–10.5)
CHLORIDE SERPL-SCNC: 104 MMOL/L — SIGNIFICANT CHANGE UP (ref 96–108)
CO2 SERPL-SCNC: 24 MMOL/L — SIGNIFICANT CHANGE UP (ref 22–31)
CREAT SERPL-MCNC: 0.85 MG/DL — SIGNIFICANT CHANGE UP (ref 0.5–1.3)
EGFR: 103 ML/MIN/1.73M2 — SIGNIFICANT CHANGE UP
GLUCOSE SERPL-MCNC: 94 MG/DL — SIGNIFICANT CHANGE UP (ref 70–99)
HCT VFR BLD CALC: 47.5 % — SIGNIFICANT CHANGE UP (ref 39–50)
HGB BLD-MCNC: 15.1 G/DL — SIGNIFICANT CHANGE UP (ref 13–17)
MCHC RBC-ENTMCNC: 28.7 PG — SIGNIFICANT CHANGE UP (ref 27–34)
MCHC RBC-ENTMCNC: 31.8 GM/DL — LOW (ref 32–36)
MCV RBC AUTO: 90.3 FL — SIGNIFICANT CHANGE UP (ref 80–100)
NRBC # BLD: 0 /100 WBCS — SIGNIFICANT CHANGE UP (ref 0–0)
PLATELET # BLD AUTO: 177 K/UL — SIGNIFICANT CHANGE UP (ref 150–400)
POTASSIUM SERPL-MCNC: 4.2 MMOL/L — SIGNIFICANT CHANGE UP (ref 3.5–5.3)
POTASSIUM SERPL-SCNC: 4.2 MMOL/L — SIGNIFICANT CHANGE UP (ref 3.5–5.3)
RBC # BLD: 5.26 M/UL — SIGNIFICANT CHANGE UP (ref 4.2–5.8)
RBC # FLD: 13.8 % — SIGNIFICANT CHANGE UP (ref 10.3–14.5)
SODIUM SERPL-SCNC: 141 MMOL/L — SIGNIFICANT CHANGE UP (ref 135–145)
WBC # BLD: 6.58 K/UL — SIGNIFICANT CHANGE UP (ref 3.8–10.5)
WBC # FLD AUTO: 6.58 K/UL — SIGNIFICANT CHANGE UP (ref 3.8–10.5)

## 2022-05-20 PROCEDURE — 36415 COLL VENOUS BLD VENIPUNCTURE: CPT

## 2022-05-20 PROCEDURE — G0463: CPT

## 2022-05-20 PROCEDURE — 85027 COMPLETE CBC AUTOMATED: CPT

## 2022-05-20 PROCEDURE — 80048 BASIC METABOLIC PNL TOTAL CA: CPT

## 2022-05-20 PROCEDURE — 87640 STAPH A DNA AMP PROBE: CPT

## 2022-05-20 PROCEDURE — 87641 MR-STAPH DNA AMP PROBE: CPT
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
## 2022-05-20 NOTE — H&P PST ADULT - PROBLEM SELECTOR PLAN 1
Baclofen Pump Trial on 6/6/22.  Medical Eval pending  Pre-op education provided - all questions answered. Pt verbalized understanding
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
## 2022-05-21 LAB
MRSA PCR RESULT.: SIGNIFICANT CHANGE UP
S AUREUS DNA NOSE QL NAA+PROBE: SIGNIFICANT CHANGE UP

## 2022-06-02 ENCOUNTER — APPOINTMENT (OUTPATIENT)
Dept: UROLOGY | Facility: CLINIC | Age: 57
End: 2022-06-02

## 2022-06-07 PROBLEM — E55.9 VITAMIN D DEFICIENCY, UNSPECIFIED: Chronic | Status: ACTIVE | Noted: 2022-05-20

## 2022-06-07 PROBLEM — M21.372 FOOT DROP, LEFT FOOT: Chronic | Status: ACTIVE | Noted: 2022-05-20

## 2022-06-07 PROBLEM — G47.33 OBSTRUCTIVE SLEEP APNEA (ADULT) (PEDIATRIC): Chronic | Status: ACTIVE | Noted: 2022-05-20

## 2022-06-07 PROBLEM — E78.1 PURE HYPERGLYCERIDEMIA: Chronic | Status: ACTIVE | Noted: 2022-05-20

## 2022-06-07 PROBLEM — G47.00 INSOMNIA, UNSPECIFIED: Chronic | Status: ACTIVE | Noted: 2022-05-20

## 2022-06-07 PROBLEM — I10 ESSENTIAL (PRIMARY) HYPERTENSION: Chronic | Status: ACTIVE | Noted: 2022-05-20

## 2022-06-07 PROBLEM — G82.20 PARAPLEGIA, UNSPECIFIED: Chronic | Status: ACTIVE | Noted: 2020-09-18

## 2022-06-07 PROBLEM — R25.2 CRAMP AND SPASM: Chronic | Status: ACTIVE | Noted: 2022-05-20

## 2022-06-07 PROBLEM — Z87.898 PERSONAL HISTORY OF OTHER SPECIFIED CONDITIONS: Chronic | Status: ACTIVE | Noted: 2022-05-20

## 2022-06-10 ENCOUNTER — APPOINTMENT (OUTPATIENT)
Dept: FAMILY MEDICINE | Facility: CLINIC | Age: 57
End: 2022-06-10

## 2022-06-14 ENCOUNTER — APPOINTMENT (OUTPATIENT)
Dept: NEUROSURGERY | Facility: CLINIC | Age: 57
End: 2022-06-14

## 2022-06-14 ENCOUNTER — APPOINTMENT (OUTPATIENT)
Dept: NEUROSURGERY | Facility: HOSPITAL | Age: 57
End: 2022-06-14

## 2022-06-14 DIAGNOSIS — R25.2 CRAMP AND SPASM: ICD-10-CM

## 2022-06-14 PROCEDURE — 99213 OFFICE O/P EST LOW 20 MIN: CPT | Mod: 57,95

## 2022-06-20 ENCOUNTER — OUTPATIENT (OUTPATIENT)
Dept: OUTPATIENT SERVICES | Facility: HOSPITAL | Age: 57
LOS: 1 days | End: 2022-06-20
Payer: MEDICARE

## 2022-06-20 DIAGNOSIS — Z11.52 ENCOUNTER FOR SCREENING FOR COVID-19: ICD-10-CM

## 2022-06-20 DIAGNOSIS — Z98.890 OTHER SPECIFIED POSTPROCEDURAL STATES: Chronic | ICD-10-CM

## 2022-06-20 DIAGNOSIS — Z89.022 ACQUIRED ABSENCE OF LEFT FINGER(S): Chronic | ICD-10-CM

## 2022-06-20 DIAGNOSIS — S06.9X9A UNSPECIFIED INTRACRANIAL INJURY WITH LOSS OF CONSCIOUSNESS OF UNSPECIFIED DURATION, INITIAL ENCOUNTER: Chronic | ICD-10-CM

## 2022-06-20 LAB — SARS-COV-2 RNA SPEC QL NAA+PROBE: SIGNIFICANT CHANGE UP

## 2022-06-20 PROCEDURE — U0003: CPT

## 2022-06-20 PROCEDURE — C9803: CPT

## 2022-06-20 PROCEDURE — U0005: CPT

## 2022-06-21 ENCOUNTER — APPOINTMENT (OUTPATIENT)
Dept: FAMILY MEDICINE | Facility: CLINIC | Age: 57
End: 2022-06-21
Payer: MEDICARE

## 2022-06-21 DIAGNOSIS — Z02.83 ENCOUNTER FOR BLOOD-ALCOHOL AND BLOOD-DRUG TEST: ICD-10-CM

## 2022-06-21 PROCEDURE — P9615: CPT

## 2022-06-22 ENCOUNTER — APPOINTMENT (OUTPATIENT)
Dept: FAMILY MEDICINE | Facility: CLINIC | Age: 57
End: 2022-06-22

## 2022-06-23 ENCOUNTER — APPOINTMENT (OUTPATIENT)
Dept: NEUROSURGERY | Facility: HOSPITAL | Age: 57
End: 2022-06-23

## 2022-06-23 PROCEDURE — ZZZZZ: CPT

## 2022-06-24 LAB
AMPHET UR-MCNC: NEGATIVE
BARBITURATES UR-MCNC: NEGATIVE
BENZODIAZ UR-MCNC: NEGATIVE
COCAINE METAB.OTHER UR-MCNC: NEGATIVE
CREATININE, URINE: 23.2 MG/DL
METHADONE UR-MCNC: NEGATIVE
METHAQUALONE UR-MCNC: NEGATIVE
OPIATES UR-MCNC: NEGATIVE
PCP UR-MCNC: NEGATIVE
PROPOXYPH UR QL: NEGATIVE
THC UR QL: NEGATIVE

## 2022-06-29 ENCOUNTER — RX RENEWAL (OUTPATIENT)
Age: 57
End: 2022-06-29

## 2022-07-06 ENCOUNTER — RX RENEWAL (OUTPATIENT)
Age: 57
End: 2022-07-06

## 2022-07-06 ENCOUNTER — APPOINTMENT (OUTPATIENT)
Dept: UROLOGY | Facility: CLINIC | Age: 57
End: 2022-07-06

## 2022-07-07 ENCOUNTER — APPOINTMENT (OUTPATIENT)
Dept: FAMILY MEDICINE | Facility: CLINIC | Age: 57
End: 2022-07-07

## 2022-07-07 ENCOUNTER — EMERGENCY (EMERGENCY)
Facility: HOSPITAL | Age: 57
LOS: 1 days | Discharge: HOME | End: 2022-07-07
Attending: STUDENT IN AN ORGANIZED HEALTH CARE EDUCATION/TRAINING PROGRAM
Payer: COMMERCIAL

## 2022-07-07 VITALS
SYSTOLIC BLOOD PRESSURE: 138 MMHG | TEMPERATURE: 98 F | WEIGHT: 190.04 LBS | OXYGEN SATURATION: 95 % | HEART RATE: 104 BPM | DIASTOLIC BLOOD PRESSURE: 98 MMHG | HEIGHT: 72 IN | RESPIRATION RATE: 20 BRPM

## 2022-07-07 VITALS
SYSTOLIC BLOOD PRESSURE: 146 MMHG | OXYGEN SATURATION: 96 % | TEMPERATURE: 98 F | RESPIRATION RATE: 19 BRPM | DIASTOLIC BLOOD PRESSURE: 80 MMHG | HEART RATE: 75 BPM

## 2022-07-07 DIAGNOSIS — Z98.890 OTHER SPECIFIED POSTPROCEDURAL STATES: Chronic | ICD-10-CM

## 2022-07-07 DIAGNOSIS — Z89.022 ACQUIRED ABSENCE OF LEFT FINGER(S): Chronic | ICD-10-CM

## 2022-07-07 DIAGNOSIS — U07.1 COVID-19: ICD-10-CM

## 2022-07-07 DIAGNOSIS — S06.9X9A UNSPECIFIED INTRACRANIAL INJURY WITH LOSS OF CONSCIOUSNESS OF UNSPECIFIED DURATION, INITIAL ENCOUNTER: Chronic | ICD-10-CM

## 2022-07-07 LAB
ALBUMIN SERPL ELPH-MCNC: 4 G/DL — SIGNIFICANT CHANGE UP (ref 3.3–5.2)
ALP SERPL-CCNC: 67 U/L — SIGNIFICANT CHANGE UP (ref 40–120)
ALT FLD-CCNC: 18 U/L — SIGNIFICANT CHANGE UP
ANION GAP SERPL CALC-SCNC: 13 MMOL/L — SIGNIFICANT CHANGE UP (ref 5–17)
APTT BLD: 33.7 SEC — SIGNIFICANT CHANGE UP (ref 27.5–35.5)
AST SERPL-CCNC: 34 U/L — SIGNIFICANT CHANGE UP
B PERT DNA SPEC QL NAA+PROBE: SIGNIFICANT CHANGE UP
BASOPHILS # BLD AUTO: 0.02 K/UL — SIGNIFICANT CHANGE UP (ref 0–0.2)
BASOPHILS NFR BLD AUTO: 0.5 % — SIGNIFICANT CHANGE UP (ref 0–2)
BILIRUB SERPL-MCNC: 0.4 MG/DL — SIGNIFICANT CHANGE UP (ref 0.4–2)
BUN SERPL-MCNC: 11.4 MG/DL — SIGNIFICANT CHANGE UP (ref 8–20)
C PNEUM DNA SPEC QL NAA+PROBE: SIGNIFICANT CHANGE UP
CALCIUM SERPL-MCNC: 8.7 MG/DL — SIGNIFICANT CHANGE UP (ref 8.6–10.2)
CHLORIDE SERPL-SCNC: 110 MMOL/L — HIGH (ref 98–107)
CK SERPL-CCNC: 83 U/L — SIGNIFICANT CHANGE UP (ref 30–200)
CO2 SERPL-SCNC: 19 MMOL/L — LOW (ref 22–29)
CREAT SERPL-MCNC: 0.73 MG/DL — SIGNIFICANT CHANGE UP (ref 0.5–1.3)
CRP SERPL-MCNC: 12 MG/L — HIGH
D DIMER BLD IA.RAPID-MCNC: <150 NG/ML DDU — SIGNIFICANT CHANGE UP
EGFR: 107 ML/MIN/1.73M2 — SIGNIFICANT CHANGE UP
EOSINOPHIL # BLD AUTO: 0.05 K/UL — SIGNIFICANT CHANGE UP (ref 0–0.5)
EOSINOPHIL NFR BLD AUTO: 1.1 % — SIGNIFICANT CHANGE UP (ref 0–6)
FERRITIN SERPL-MCNC: 160 NG/ML — SIGNIFICANT CHANGE UP (ref 30–400)
FLUAV H1 2009 PAND RNA SPEC QL NAA+PROBE: SIGNIFICANT CHANGE UP
FLUAV H1 RNA SPEC QL NAA+PROBE: SIGNIFICANT CHANGE UP
FLUAV H3 RNA SPEC QL NAA+PROBE: SIGNIFICANT CHANGE UP
FLUAV SUBTYP SPEC NAA+PROBE: SIGNIFICANT CHANGE UP
FLUBV RNA SPEC QL NAA+PROBE: SIGNIFICANT CHANGE UP
GLUCOSE SERPL-MCNC: 143 MG/DL — HIGH (ref 70–99)
HADV DNA SPEC QL NAA+PROBE: SIGNIFICANT CHANGE UP
HCOV PNL SPEC NAA+PROBE: SIGNIFICANT CHANGE UP
HCT VFR BLD CALC: 46 % — SIGNIFICANT CHANGE UP (ref 39–50)
HGB BLD-MCNC: 15.3 G/DL — SIGNIFICANT CHANGE UP (ref 13–17)
HMPV RNA SPEC QL NAA+PROBE: SIGNIFICANT CHANGE UP
HPIV1 RNA SPEC QL NAA+PROBE: SIGNIFICANT CHANGE UP
HPIV2 RNA SPEC QL NAA+PROBE: SIGNIFICANT CHANGE UP
HPIV3 RNA SPEC QL NAA+PROBE: SIGNIFICANT CHANGE UP
HPIV4 RNA SPEC QL NAA+PROBE: SIGNIFICANT CHANGE UP
IMM GRANULOCYTES NFR BLD AUTO: 0.5 % — SIGNIFICANT CHANGE UP (ref 0–1.5)
INR BLD: 0.97 RATIO — SIGNIFICANT CHANGE UP (ref 0.88–1.16)
LYMPHOCYTES # BLD AUTO: 0.96 K/UL — LOW (ref 1–3.3)
LYMPHOCYTES # BLD AUTO: 22 % — SIGNIFICANT CHANGE UP (ref 13–44)
MCHC RBC-ENTMCNC: 29.5 PG — SIGNIFICANT CHANGE UP (ref 27–34)
MCHC RBC-ENTMCNC: 33.3 GM/DL — SIGNIFICANT CHANGE UP (ref 32–36)
MCV RBC AUTO: 88.6 FL — SIGNIFICANT CHANGE UP (ref 80–100)
MONOCYTES # BLD AUTO: 0.4 K/UL — SIGNIFICANT CHANGE UP (ref 0–0.9)
MONOCYTES NFR BLD AUTO: 9.2 % — SIGNIFICANT CHANGE UP (ref 2–14)
NEUTROPHILS # BLD AUTO: 2.91 K/UL — SIGNIFICANT CHANGE UP (ref 1.8–7.4)
NEUTROPHILS NFR BLD AUTO: 66.7 % — SIGNIFICANT CHANGE UP (ref 43–77)
PLATELET # BLD AUTO: 152 K/UL — SIGNIFICANT CHANGE UP (ref 150–400)
POTASSIUM SERPL-MCNC: 4.6 MMOL/L — SIGNIFICANT CHANGE UP (ref 3.5–5.3)
POTASSIUM SERPL-SCNC: 4.6 MMOL/L — SIGNIFICANT CHANGE UP (ref 3.5–5.3)
PROCALCITONIN SERPL-MCNC: 0.05 NG/ML — SIGNIFICANT CHANGE UP (ref 0.02–0.1)
PROT SERPL-MCNC: 6.5 G/DL — LOW (ref 6.6–8.7)
PROTHROM AB SERPL-ACNC: 11.2 SEC — SIGNIFICANT CHANGE UP (ref 10.5–13.4)
RAPID RVP RESULT: DETECTED
RBC # BLD: 5.19 M/UL — SIGNIFICANT CHANGE UP (ref 4.2–5.8)
RBC # FLD: 13.6 % — SIGNIFICANT CHANGE UP (ref 10.3–14.5)
RV+EV RNA SPEC QL NAA+PROBE: SIGNIFICANT CHANGE UP
SARS-COV-2 RNA SPEC QL NAA+PROBE: DETECTED
SODIUM SERPL-SCNC: 142 MMOL/L — SIGNIFICANT CHANGE UP (ref 135–145)
TROPONIN T SERPL-MCNC: <0.01 NG/ML — SIGNIFICANT CHANGE UP (ref 0–0.06)
WBC # BLD: 4.36 K/UL — SIGNIFICANT CHANGE UP (ref 3.8–10.5)
WBC # FLD AUTO: 4.36 K/UL — SIGNIFICANT CHANGE UP (ref 3.8–10.5)

## 2022-07-07 PROCEDURE — 85610 PROTHROMBIN TIME: CPT

## 2022-07-07 PROCEDURE — 99285 EMERGENCY DEPT VISIT HI MDM: CPT | Mod: 25

## 2022-07-07 PROCEDURE — 93005 ELECTROCARDIOGRAM TRACING: CPT

## 2022-07-07 PROCEDURE — 0225U NFCT DS DNA&RNA 21 SARSCOV2: CPT

## 2022-07-07 PROCEDURE — 36415 COLL VENOUS BLD VENIPUNCTURE: CPT

## 2022-07-07 PROCEDURE — 85025 COMPLETE CBC W/AUTO DIFF WBC: CPT

## 2022-07-07 PROCEDURE — 80053 COMPREHEN METABOLIC PANEL: CPT

## 2022-07-07 PROCEDURE — 71045 X-RAY EXAM CHEST 1 VIEW: CPT | Mod: 26

## 2022-07-07 PROCEDURE — 84145 PROCALCITONIN (PCT): CPT

## 2022-07-07 PROCEDURE — 99284 EMERGENCY DEPT VISIT MOD MDM: CPT

## 2022-07-07 PROCEDURE — 82550 ASSAY OF CK (CPK): CPT

## 2022-07-07 PROCEDURE — 93010 ELECTROCARDIOGRAM REPORT: CPT

## 2022-07-07 PROCEDURE — 82728 ASSAY OF FERRITIN: CPT

## 2022-07-07 PROCEDURE — 99213 OFFICE O/P EST LOW 20 MIN: CPT | Mod: 95

## 2022-07-07 PROCEDURE — 86140 C-REACTIVE PROTEIN: CPT

## 2022-07-07 PROCEDURE — 71045 X-RAY EXAM CHEST 1 VIEW: CPT

## 2022-07-07 PROCEDURE — 85730 THROMBOPLASTIN TIME PARTIAL: CPT

## 2022-07-07 PROCEDURE — 94640 AIRWAY INHALATION TREATMENT: CPT

## 2022-07-07 PROCEDURE — 84484 ASSAY OF TROPONIN QUANT: CPT

## 2022-07-07 PROCEDURE — 85379 FIBRIN DEGRADATION QUANT: CPT

## 2022-07-07 RX ORDER — ALBUTEROL 90 UG/1
2 AEROSOL, METERED ORAL ONCE
Refills: 0 | Status: COMPLETED | OUTPATIENT
Start: 2022-07-07 | End: 2022-07-07

## 2022-07-07 RX ORDER — NAPROXEN 500 MG/1
500 TABLET ORAL
Qty: 42 | Refills: 0 | Status: DISCONTINUED | COMMUNITY
Start: 2021-12-30 | End: 2022-07-07

## 2022-07-07 RX ORDER — ACETAMINOPHEN 500 MG
650 TABLET ORAL ONCE
Refills: 0 | Status: COMPLETED | OUTPATIENT
Start: 2022-07-07 | End: 2022-07-07

## 2022-07-07 RX ORDER — NIRMATRELVIR AND RITONAVIR 150-100 MG
1 KIT ORAL
Qty: 10 | Refills: 0
Start: 2022-07-07 | End: 2022-07-11

## 2022-07-07 RX ORDER — BACLOFEN 10 MG/1
10 TABLET ORAL
Qty: 270 | Refills: 0 | Status: DISCONTINUED | COMMUNITY
Start: 2020-05-18 | End: 2022-07-07

## 2022-07-07 RX ORDER — GUAIFENESIN/DEXTROMETHORPHAN 600MG-30MG
10 TABLET, EXTENDED RELEASE 12 HR ORAL ONCE
Refills: 0 | Status: COMPLETED | OUTPATIENT
Start: 2022-07-07 | End: 2022-07-07

## 2022-07-07 RX ORDER — SODIUM CHLORIDE 9 MG/ML
1000 INJECTION INTRAMUSCULAR; INTRAVENOUS; SUBCUTANEOUS ONCE
Refills: 0 | Status: COMPLETED | OUTPATIENT
Start: 2022-07-07 | End: 2022-07-07

## 2022-07-07 RX ADMIN — Medication 10 MILLILITER(S): at 14:59

## 2022-07-07 RX ADMIN — Medication 650 MILLIGRAM(S): at 14:59

## 2022-07-07 RX ADMIN — SODIUM CHLORIDE 1000 MILLILITER(S): 9 INJECTION INTRAMUSCULAR; INTRAVENOUS; SUBCUTANEOUS at 14:59

## 2022-07-07 RX ADMIN — ALBUTEROL 2 PUFF(S): 90 AEROSOL, METERED ORAL at 14:52

## 2022-07-07 NOTE — REVIEW OF SYSTEMS
[Fatigue] : fatigue [Nasal Discharge] : nasal discharge [Shortness Of Breath] : shortness of breath [Cough] : cough [Negative] : Psychiatric

## 2022-07-07 NOTE — ED ADULT NURSE NOTE - OBJECTIVE STATEMENT
pt alrtt and oriented x4 comes in cov+ as of sunday home test. pt c/o cough, body aches congestion. was told to come to Emergency Department for antivirals. respiratins even unlabored
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
## 2022-07-07 NOTE — ED PROVIDER NOTE - PATIENT PORTAL LINK FT
You can access the FollowMyHealth Patient Portal offered by [facility] by registering at the following website: [email or web address]. By joining [employer]’s FollowMyHealth portal, you will also be able to view your health information using other applications (apps) compatible with our system.

## 2022-07-07 NOTE — ASSESSMENT
[FreeTextEntry1] : Covid19 infection: pt notes sob, recommend ER evaluation, pt referred to hospital\par RTC 2 wks

## 2022-07-07 NOTE — ED PROVIDER NOTE - CLINICAL SUMMARY MEDICAL DECISION MAKING FREE TEXT BOX
plan to check labs, r/o pna, will teat with antiviral given  h/o tbi and plan for outpatient monoclonal antibody therapy

## 2022-07-07 NOTE — HISTORY OF PRESENT ILLNESS
[Home] : at home, [unfilled] , at the time of the visit. [Medical Office: ([facility])___] : at the medical office located in  [Verbal consent obtained from patient] : the patient, [unfilled] [FreeTextEntry8] : 56 yo male presents with complaint of covid19 infection. pt reports that reports malaise, nasal congestion, tested positive on Bernard 7/3/22. Reports having difficulty breathing for the past few days. Denies fever, chills, cp, palpitations, nv, heat/cold intolerance, dizziness, melena, hematochezia, muscle weakness, loss of sensation, bowel/bladder incontinence or calf pain.\par

## 2022-07-07 NOTE — ED PROVIDER NOTE - MUSCULOSKELETAL, MLM
Spine appears normal, range of motion is not limited, no muscle or joint tenderness, b/l leg swelling
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
## 2022-07-07 NOTE — ED PROVIDER NOTE - NSFOLLOWUPINSTRUCTIONS_ED_ALL_ED_FT
-You tested positive for COVID  -A prescription for Paxlovid was sent to [facility] - take twice daily for 5 days   -Return to the ED with any new/worse/concerning symptoms      SEEK IMMEDIATE MEDICAL CARE IF YOU HAVE ANY OF THE FOLLOWING SYMPTOMS:  shortness of breath, chest pain, back pain, abdominal pain, fever, coughing up blood, lightheadedness/dizziness.

## 2022-07-07 NOTE — ED ADULT NURSE REASSESSMENT NOTE - NS ED NURSE REASSESS COMMENT FT1
Report received, care assumed.  Patient has been medically cleared for discharge.  Ambulance present to take patient back to his home.  IV removed.  Offers no complaints at this time.

## 2022-07-12 ENCOUNTER — RX RENEWAL (OUTPATIENT)
Age: 57
End: 2022-07-12

## 2022-07-13 ENCOUNTER — OUTPATIENT (OUTPATIENT)
Dept: OUTPATIENT SERVICES | Facility: HOSPITAL | Age: 57
LOS: 1 days | End: 2022-07-13
Payer: MEDICARE

## 2022-07-13 VITALS
WEIGHT: 210.1 LBS | OXYGEN SATURATION: 96 % | HEIGHT: 72 IN | SYSTOLIC BLOOD PRESSURE: 113 MMHG | HEART RATE: 78 BPM | DIASTOLIC BLOOD PRESSURE: 77 MMHG | RESPIRATION RATE: 16 BRPM | TEMPERATURE: 98 F

## 2022-07-13 DIAGNOSIS — G47.33 OBSTRUCTIVE SLEEP APNEA (ADULT) (PEDIATRIC): ICD-10-CM

## 2022-07-13 DIAGNOSIS — Z98.890 OTHER SPECIFIED POSTPROCEDURAL STATES: Chronic | ICD-10-CM

## 2022-07-13 DIAGNOSIS — R25.2 CRAMP AND SPASM: ICD-10-CM

## 2022-07-13 DIAGNOSIS — Z01.818 ENCOUNTER FOR OTHER PREPROCEDURAL EXAMINATION: ICD-10-CM

## 2022-07-13 DIAGNOSIS — S06.9X9A UNSPECIFIED INTRACRANIAL INJURY WITH LOSS OF CONSCIOUSNESS OF UNSPECIFIED DURATION, INITIAL ENCOUNTER: Chronic | ICD-10-CM

## 2022-07-13 DIAGNOSIS — Z89.022 ACQUIRED ABSENCE OF LEFT FINGER(S): Chronic | ICD-10-CM

## 2022-07-13 LAB
ANION GAP SERPL CALC-SCNC: 12 MMOL/L — SIGNIFICANT CHANGE UP (ref 5–17)
BUN SERPL-MCNC: 13 MG/DL — SIGNIFICANT CHANGE UP (ref 7–23)
CALCIUM SERPL-MCNC: 9.1 MG/DL — SIGNIFICANT CHANGE UP (ref 8.4–10.5)
CHLORIDE SERPL-SCNC: 107 MMOL/L — SIGNIFICANT CHANGE UP (ref 96–108)
CO2 SERPL-SCNC: 25 MMOL/L — SIGNIFICANT CHANGE UP (ref 22–31)
CREAT SERPL-MCNC: 0.69 MG/DL — SIGNIFICANT CHANGE UP (ref 0.5–1.3)
EGFR: 109 ML/MIN/1.73M2 — SIGNIFICANT CHANGE UP
GLUCOSE SERPL-MCNC: 109 MG/DL — HIGH (ref 70–99)
HCT VFR BLD CALC: 44.2 % — SIGNIFICANT CHANGE UP (ref 39–50)
HGB BLD-MCNC: 14.3 G/DL — SIGNIFICANT CHANGE UP (ref 13–17)
MCHC RBC-ENTMCNC: 29.2 PG — SIGNIFICANT CHANGE UP (ref 27–34)
MCHC RBC-ENTMCNC: 32.4 GM/DL — SIGNIFICANT CHANGE UP (ref 32–36)
MCV RBC AUTO: 90.2 FL — SIGNIFICANT CHANGE UP (ref 80–100)
NRBC # BLD: 0 /100 WBCS — SIGNIFICANT CHANGE UP (ref 0–0)
PLATELET # BLD AUTO: 216 K/UL — SIGNIFICANT CHANGE UP (ref 150–400)
POTASSIUM SERPL-MCNC: 4.2 MMOL/L — SIGNIFICANT CHANGE UP (ref 3.5–5.3)
POTASSIUM SERPL-SCNC: 4.2 MMOL/L — SIGNIFICANT CHANGE UP (ref 3.5–5.3)
RBC # BLD: 4.9 M/UL — SIGNIFICANT CHANGE UP (ref 4.2–5.8)
RBC # FLD: 14 % — SIGNIFICANT CHANGE UP (ref 10.3–14.5)
SODIUM SERPL-SCNC: 144 MMOL/L — SIGNIFICANT CHANGE UP (ref 135–145)
WBC # BLD: 5.45 K/UL — SIGNIFICANT CHANGE UP (ref 3.8–10.5)
WBC # FLD AUTO: 5.45 K/UL — SIGNIFICANT CHANGE UP (ref 3.8–10.5)

## 2022-07-13 PROCEDURE — 87641 MR-STAPH DNA AMP PROBE: CPT

## 2022-07-13 PROCEDURE — 87640 STAPH A DNA AMP PROBE: CPT

## 2022-07-13 PROCEDURE — G0463: CPT

## 2022-07-13 PROCEDURE — 80048 BASIC METABOLIC PNL TOTAL CA: CPT

## 2022-07-13 PROCEDURE — 36415 COLL VENOUS BLD VENIPUNCTURE: CPT

## 2022-07-13 PROCEDURE — 85027 COMPLETE CBC AUTOMATED: CPT

## 2022-07-13 RX ORDER — CEFAZOLIN SODIUM 1 G
2000 VIAL (EA) INJECTION ONCE
Refills: 0 | Status: DISCONTINUED | OUTPATIENT
Start: 2022-07-21 | End: 2022-08-04

## 2022-07-13 RX ORDER — ATORVASTATIN CALCIUM 80 MG/1
1 TABLET, FILM COATED ORAL
Qty: 0 | Refills: 0 | DISCHARGE

## 2022-07-13 RX ORDER — BACLOFEN 100 %
30 POWDER (GRAM) MISCELLANEOUS
Qty: 0 | Refills: 0 | DISCHARGE

## 2022-07-13 RX ORDER — QUETIAPINE FUMARATE 200 MG/1
1 TABLET, FILM COATED ORAL
Qty: 0 | Refills: 0 | DISCHARGE

## 2022-07-13 NOTE — H&P PST ADULT - NS PRO ABUSE SCREEN SUSPICION NEGLECT YN
Detail Level: Detailed Introduction Text (Please End With A Colon): The following procedure was deferred: Scheduling Instructions (Optional): schedule 10 weeks ,patient having surgery no
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
## 2022-07-13 NOTE — H&P PST ADULT - FALL HARM RISK - HARM RISK INTERVENTIONS

## 2022-07-13 NOTE — H&P PST ADULT - PROBLEM SELECTOR PLAN 1
Baclofen Pump Trial on 6/6/22.  Medical Eval pending  Pre-op education provided - all questions answered. Pt verbalized understanding INTRATHECAL BACLOFEN TRIAL  Pre-op education provided - all questions answered   Chlorhex soap & instructions given  CBC, BMP, MRSA/MSSA swab sent in PST  Continue dantrolene, baclofen, sertraline DOS w/small sips of water  To continue on Asprin until day of surgery

## 2022-07-14 LAB
MRSA PCR RESULT.: SIGNIFICANT CHANGE UP
S AUREUS DNA NOSE QL NAA+PROBE: SIGNIFICANT CHANGE UP

## 2022-07-18 PROBLEM — J30.2 OTHER SEASONAL ALLERGIC RHINITIS: Chronic | Status: ACTIVE | Noted: 2022-07-13

## 2022-07-18 PROBLEM — Z86.59 PERSONAL HISTORY OF OTHER MENTAL AND BEHAVIORAL DISORDERS: Chronic | Status: ACTIVE | Noted: 2022-07-13

## 2022-07-18 PROBLEM — U07.1 COVID-19: Chronic | Status: ACTIVE | Noted: 2022-07-13

## 2022-07-20 ENCOUNTER — TRANSCRIPTION ENCOUNTER (OUTPATIENT)
Age: 57
End: 2022-07-20

## 2022-07-20 ENCOUNTER — APPOINTMENT (OUTPATIENT)
Dept: UROLOGY | Facility: CLINIC | Age: 57
End: 2022-07-20

## 2022-07-20 DIAGNOSIS — N52.9 MALE ERECTILE DYSFUNCTION, UNSPECIFIED: ICD-10-CM

## 2022-07-20 PROCEDURE — 99213 OFFICE O/P EST LOW 20 MIN: CPT

## 2022-07-20 RX ORDER — HYDROCHLOROTHIAZIDE 25 MG/1
25 TABLET ORAL
Qty: 90 | Refills: 0 | Status: DISCONTINUED | COMMUNITY
Start: 2020-10-13 | End: 2022-07-20

## 2022-07-20 RX ORDER — CYANOCOBALAMIN (VITAMIN B-12) 100 MCG
100 TABLET ORAL
Qty: 90 | Refills: 0 | Status: DISCONTINUED | COMMUNITY
Start: 2019-10-24 | End: 2022-07-20

## 2022-07-20 RX ORDER — FENOFIBRATE 160 MG/1
160 TABLET ORAL
Qty: 90 | Refills: 0 | Status: DISCONTINUED | COMMUNITY
Start: 2018-12-13 | End: 2022-07-20

## 2022-07-20 RX ORDER — TERBINAFINE HYDROCHLORIDE 1 G/100G
1 CREAM TOPICAL 3 TIMES DAILY
Qty: 1 | Refills: 0 | Status: DISCONTINUED | COMMUNITY
Start: 2022-02-09 | End: 2022-07-20

## 2022-07-20 RX ORDER — MULTIVIT-MIN/FOLIC/VIT K/LYCOP 400-300MCG
250 TABLET ORAL
Qty: 90 | Refills: 0 | Status: DISCONTINUED | COMMUNITY
Start: 2018-08-07 | End: 2022-07-20

## 2022-07-20 NOTE — LETTER BODY
[Dear  ___] : Dear  [unfilled], [Courtesy Letter:] : I had the pleasure of seeing your patient, [unfilled], in my office today. [Please see my note below.] : Please see my note below. [Sincerely,] : Sincerely, [FreeTextEntry3] : Ed\par \par Abraham Lee MD\par [facility]\par  of Urology\par [employer] at [facility]\par

## 2022-07-20 NOTE — ASSESSMENT
[FreeTextEntry1] : Impression:\par \par possible external sphincter spasm, he ahs a baclofen pump trial. We will refill tamsulosin  and evaluate efficacy of baclofen pump. \par Followup 6 months.

## 2022-07-20 NOTE — HISTORY OF PRESENT ILLNESS
[FreeTextEntry1] : he ahs some dysuria at the tip.  Reasonably good FOS. no urgency.  he basically feels empty after voiding.  he describes a weird feeling when he gets the urge to void.  "It's hard to explain." Erections with the tadalafil are good.

## 2022-07-21 ENCOUNTER — TRANSCRIPTION ENCOUNTER (OUTPATIENT)
Age: 57
End: 2022-07-21

## 2022-07-21 ENCOUNTER — APPOINTMENT (OUTPATIENT)
Dept: NEUROSURGERY | Facility: HOSPITAL | Age: 57
End: 2022-07-21

## 2022-07-21 ENCOUNTER — OUTPATIENT (OUTPATIENT)
Dept: INPATIENT UNIT | Facility: HOSPITAL | Age: 57
LOS: 1 days | End: 2022-07-21
Payer: MEDICARE

## 2022-07-21 VITALS
DIASTOLIC BLOOD PRESSURE: 85 MMHG | HEART RATE: 97 BPM | HEIGHT: 72 IN | WEIGHT: 210.1 LBS | TEMPERATURE: 98 F | RESPIRATION RATE: 14 BRPM | OXYGEN SATURATION: 95 % | SYSTOLIC BLOOD PRESSURE: 124 MMHG

## 2022-07-21 VITALS
RESPIRATION RATE: 16 BRPM | SYSTOLIC BLOOD PRESSURE: 133 MMHG | DIASTOLIC BLOOD PRESSURE: 77 MMHG | OXYGEN SATURATION: 98 % | HEART RATE: 82 BPM

## 2022-07-21 DIAGNOSIS — R25.2 CRAMP AND SPASM: ICD-10-CM

## 2022-07-21 DIAGNOSIS — Z98.890 OTHER SPECIFIED POSTPROCEDURAL STATES: Chronic | ICD-10-CM

## 2022-07-21 DIAGNOSIS — S06.9X9A UNSPECIFIED INTRACRANIAL INJURY WITH LOSS OF CONSCIOUSNESS OF UNSPECIFIED DURATION, INITIAL ENCOUNTER: Chronic | ICD-10-CM

## 2022-07-21 DIAGNOSIS — Z89.022 ACQUIRED ABSENCE OF LEFT FINGER(S): Chronic | ICD-10-CM

## 2022-07-21 PROCEDURE — 62323 NJX INTERLAMINAR LMBR/SAC: CPT

## 2022-07-21 PROCEDURE — 97110 THERAPEUTIC EXERCISES: CPT

## 2022-07-21 PROCEDURE — 76000 FLUOROSCOPY <1 HR PHYS/QHP: CPT

## 2022-07-21 PROCEDURE — 97161 PT EVAL LOW COMPLEX 20 MIN: CPT

## 2022-07-21 RX ORDER — ACETAMINOPHEN 500 MG
1000 TABLET ORAL ONCE
Refills: 0 | Status: DISCONTINUED | OUTPATIENT
Start: 2022-07-21 | End: 2022-07-21

## 2022-07-21 RX ORDER — BACLOFEN 100 %
75 POWDER (GRAM) MISCELLANEOUS ONCE
Refills: 0 | Status: DISCONTINUED | OUTPATIENT
Start: 2022-07-21 | End: 2022-07-21

## 2022-07-21 RX ORDER — BACLOFEN 100 %
50 POWDER (GRAM) MISCELLANEOUS ONCE
Refills: 0 | Status: DISCONTINUED | OUTPATIENT
Start: 2022-07-21 | End: 2022-07-21

## 2022-07-21 RX ORDER — BACLOFEN 100 %
30 POWDER (GRAM) MISCELLANEOUS ONCE
Refills: 0 | Status: DISCONTINUED | OUTPATIENT
Start: 2022-07-21 | End: 2022-08-04

## 2022-07-21 RX ORDER — LIDOCAINE HCL 20 MG/ML
0.2 VIAL (ML) INJECTION ONCE
Refills: 0 | Status: COMPLETED | OUTPATIENT
Start: 2022-07-21 | End: 2022-07-21

## 2022-07-21 RX ORDER — ONDANSETRON 8 MG/1
4 TABLET, FILM COATED ORAL ONCE
Refills: 0 | Status: DISCONTINUED | OUTPATIENT
Start: 2022-07-21 | End: 2022-07-21

## 2022-07-21 RX ORDER — SODIUM CHLORIDE 9 MG/ML
3 INJECTION INTRAMUSCULAR; INTRAVENOUS; SUBCUTANEOUS EVERY 8 HOURS
Refills: 0 | Status: DISCONTINUED | OUTPATIENT
Start: 2022-07-21 | End: 2022-07-21

## 2022-07-21 RX ORDER — SODIUM CHLORIDE 9 MG/ML
1000 INJECTION, SOLUTION INTRAVENOUS
Refills: 0 | Status: DISCONTINUED | OUTPATIENT
Start: 2022-07-21 | End: 2022-08-04

## 2022-07-21 RX ORDER — CHLORHEXIDINE GLUCONATE 213 G/1000ML
1 SOLUTION TOPICAL ONCE
Refills: 0 | Status: COMPLETED | OUTPATIENT
Start: 2022-07-21 | End: 2022-07-21

## 2022-07-21 RX ADMIN — CHLORHEXIDINE GLUCONATE 1 APPLICATION(S): 213 SOLUTION TOPICAL at 09:00

## 2022-07-21 RX ADMIN — SODIUM CHLORIDE 75 MILLILITER(S): 9 INJECTION, SOLUTION INTRAVENOUS at 12:35

## 2022-07-21 NOTE — ASU DISCHARGE PLAN (ADULT/PEDIATRIC) - CARE PROVIDER_API CALL
Janee Barraza (MD)  Neurosurgery  [address], [address]  [place] [address]  Phone: [phone]  Fax: [phone]  Follow Up Time:

## 2022-07-21 NOTE — PHYSICAL THERAPY INITIAL EVALUATION ADULT - ADDITIONAL COMMENTS
pt uses power wheelchair for mobility. pt performs PT Wound Care to bed transfers with a sliding board and toilet transfers via pop-over. bilateral hamstrings with decreased flexibility R worse than L (R hip flexion via SLR ~40deg, L hip flexion via SLR ~60deg).  Modified Aleshia Scale:   L ankle DF/PF: 0, L knee ext: 2, L knee flex: 0, L hip flex: 1+, L hip ext: 0, L hip abd: 3, L hip add: 0, L hip ER: 2, L hip IR: 0  R ankle DF: 1, R ankle PF: 0, R knee ext: 3, R knee flex: 0, R hip flex: 3, R hip ext: 0, R hip abd: 3, R hip add: 0, R hip ER: 3, R hip IR: 0

## 2022-07-21 NOTE — ASU PATIENT PROFILE, ADULT - FALL HARM RISK - HARM RISK INTERVENTIONS
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
## 2022-07-21 NOTE — BRIEF OPERATIVE NOTE - NSICDXBRIEFPROCEDURE_GEN_ALL_CORE_FT
PROCEDURES:  Injection, baclofen, spine, lumbar, single, for trial 21-Jul-2022 11:27:25  Emely Ronquillo

## 2022-07-21 NOTE — ASU DISCHARGE PLAN (ADULT/PEDIATRIC) - ASU DC SPECIAL INSTRUCTIONSFT
Patient underwent intrathecal baclofen injection as trial for possible baclofen pump. He will be evaluated by PT in the recovery area and if experiences improvement in spasticity will eventually be scheduled for pump.     Dressing can be removed tomorrow. No change to activity or diet, continue home medications. No antibiotics or other new prescriptions.     Follow up with Dr. Barraza in 7-10 days.
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
## 2022-07-26 ENCOUNTER — APPOINTMENT (OUTPATIENT)
Dept: FAMILY MEDICINE | Facility: CLINIC | Age: 57
End: 2022-07-26

## 2022-07-26 PROCEDURE — 81003 URINALYSIS AUTO W/O SCOPE: CPT | Mod: QW

## 2022-07-31 LAB
AMPHET UR-MCNC: NEGATIVE
BARBITURATES UR-MCNC: NEGATIVE
BENZODIAZ UR-MCNC: NEGATIVE
COCAINE METAB.OTHER UR-MCNC: NEGATIVE
CREATININE, URINE: 52.5 MG/DL
METHADONE UR-MCNC: NEGATIVE
METHAQUALONE UR-MCNC: NEGATIVE
OPIATES UR-MCNC: NEGATIVE
PCP UR-MCNC: NEGATIVE
PROPOXYPH UR QL: NEGATIVE
THC UR QL: NEGATIVE

## 2022-08-02 ENCOUNTER — APPOINTMENT (OUTPATIENT)
Dept: NEUROSURGERY | Facility: CLINIC | Age: 57
End: 2022-08-02

## 2022-08-09 ENCOUNTER — RX RENEWAL (OUTPATIENT)
Age: 57
End: 2022-08-09

## 2022-08-09 DIAGNOSIS — R21 RASH AND OTHER NONSPECIFIC SKIN ERUPTION: ICD-10-CM

## 2022-08-10 ENCOUNTER — RX RENEWAL (OUTPATIENT)
Age: 57
End: 2022-08-10

## 2022-08-14 ENCOUNTER — RX RENEWAL (OUTPATIENT)
Age: 57
End: 2022-08-14

## 2022-08-16 ENCOUNTER — APPOINTMENT (OUTPATIENT)
Dept: FAMILY MEDICINE | Facility: CLINIC | Age: 57
End: 2022-08-16

## 2022-08-16 VITALS
DIASTOLIC BLOOD PRESSURE: 88 MMHG | TEMPERATURE: 97.5 F | HEART RATE: 89 BPM | SYSTOLIC BLOOD PRESSURE: 138 MMHG | HEIGHT: 72 IN | OXYGEN SATURATION: 96 % | WEIGHT: 235 LBS | BODY MASS INDEX: 31.83 KG/M2

## 2022-08-16 PROCEDURE — 36415 COLL VENOUS BLD VENIPUNCTURE: CPT

## 2022-08-16 PROCEDURE — 99213 OFFICE O/P EST LOW 20 MIN: CPT | Mod: 25

## 2022-08-16 NOTE — ASSESSMENT
[FreeTextEntry1] : HTN: restart HCTZ 25 mg po daily, recommend low salt diet, wt loss, exercise, DASH diet\par Leg swelling: f/u US duplex b/l LE, f/u labwork including CMP, pr/cr ratio, refer to cardio for TTE, recommend leg exercises/leg raise/compression stockings\par RTC 2 wks

## 2022-08-16 NOTE — PHYSICAL EXAM
[Normal] : affect was normal and insight and judgment were intact [de-identified] : b/l LE edema 2+ [de-identified] : b/l LE motor strength 1/5, sensation intact

## 2022-08-16 NOTE — HISTORY OF PRESENT ILLNESS
[FreeTextEntry8] : 56 yo male presents with complaint of b/l leg swelling. he says it began a few weeks ago. He was previously on HCTZ which he says helped prevent swelling. he has no sob. Denies fever, chills, cp, palpitations, sob, nv, heat/cold intolerance, dizziness, melena, hematochezia, muscle weakness, loss of sensation, bowel/bladder incontinence or calf pain.\par

## 2022-08-18 ENCOUNTER — RX RENEWAL (OUTPATIENT)
Age: 57
End: 2022-08-18

## 2022-08-19 ENCOUNTER — APPOINTMENT (OUTPATIENT)
Dept: NEUROSURGERY | Facility: CLINIC | Age: 57
End: 2022-08-19

## 2022-08-19 PROCEDURE — 99214 OFFICE O/P EST MOD 30 MIN: CPT | Mod: 57

## 2022-08-23 ENCOUNTER — RX RENEWAL (OUTPATIENT)
Age: 57
End: 2022-08-23

## 2022-08-23 ENCOUNTER — APPOINTMENT (OUTPATIENT)
Dept: ULTRASOUND IMAGING | Facility: CLINIC | Age: 57
End: 2022-08-23

## 2022-08-23 LAB
ALBUMIN SERPL ELPH-MCNC: 4.4 G/DL
ALP BLD-CCNC: 78 U/L
ALT SERPL-CCNC: 20 U/L
ANION GAP SERPL CALC-SCNC: 13 MMOL/L
AST SERPL-CCNC: 19 U/L
BASOPHILS # BLD AUTO: 0.09 K/UL
BASOPHILS NFR BLD AUTO: 1.5 %
BILIRUB SERPL-MCNC: 0.4 MG/DL
BUN SERPL-MCNC: 8 MG/DL
CALCIUM SERPL-MCNC: 9.5 MG/DL
CHLORIDE SERPL-SCNC: 106 MMOL/L
CO2 SERPL-SCNC: 23 MMOL/L
CREAT SERPL-MCNC: 0.73 MG/DL
CREAT SPEC-SCNC: 33 MG/DL
CREAT/PROT UR: 0.2 RATIO
EGFR: 107 ML/MIN/1.73M2
EOSINOPHIL # BLD AUTO: 0.1 K/UL
EOSINOPHIL NFR BLD AUTO: 1.7 %
GLUCOSE SERPL-MCNC: 100 MG/DL
HCT VFR BLD CALC: 45.4 %
HGB BLD-MCNC: 14.7 G/DL
IMM GRANULOCYTES NFR BLD AUTO: 0.3 %
LYMPHOCYTES # BLD AUTO: 1.22 K/UL
LYMPHOCYTES NFR BLD AUTO: 20.3 %
MAN DIFF?: NORMAL
MCHC RBC-ENTMCNC: 30.3 PG
MCHC RBC-ENTMCNC: 32.4 GM/DL
MCV RBC AUTO: 93.6 FL
MONOCYTES # BLD AUTO: 0.66 K/UL
MONOCYTES NFR BLD AUTO: 11 %
NEUTROPHILS # BLD AUTO: 3.93 K/UL
NEUTROPHILS NFR BLD AUTO: 65.2 %
PLATELET # BLD AUTO: 195 K/UL
POTASSIUM SERPL-SCNC: 4.5 MMOL/L
PROT SERPL-MCNC: 6.2 G/DL
PROT UR-MCNC: 7 MG/DL
RBC # BLD: 4.85 M/UL
RBC # FLD: 15.4 %
SODIUM SERPL-SCNC: 142 MMOL/L
TSH SERPL-ACNC: 1.26 UIU/ML
WBC # FLD AUTO: 6.02 K/UL

## 2022-08-24 ENCOUNTER — APPOINTMENT (OUTPATIENT)
Dept: CARDIOLOGY | Facility: CLINIC | Age: 57
End: 2022-08-24

## 2022-08-30 ENCOUNTER — APPOINTMENT (OUTPATIENT)
Dept: CARDIOLOGY | Facility: CLINIC | Age: 57
End: 2022-08-30

## 2022-08-31 ENCOUNTER — APPOINTMENT (OUTPATIENT)
Dept: ULTRASOUND IMAGING | Facility: CLINIC | Age: 57
End: 2022-08-31

## 2022-09-01 ENCOUNTER — APPOINTMENT (OUTPATIENT)
Dept: FAMILY MEDICINE | Facility: CLINIC | Age: 57
End: 2022-09-01

## 2022-09-01 PROCEDURE — P9615: CPT

## 2022-09-02 ENCOUNTER — OUTPATIENT (OUTPATIENT)
Dept: OUTPATIENT SERVICES | Facility: HOSPITAL | Age: 57
LOS: 1 days | End: 2022-09-02
Payer: MEDICARE

## 2022-09-02 VITALS
HEIGHT: 72 IN | RESPIRATION RATE: 18 BRPM | WEIGHT: 210.1 LBS | SYSTOLIC BLOOD PRESSURE: 111 MMHG | DIASTOLIC BLOOD PRESSURE: 77 MMHG | OXYGEN SATURATION: 96 % | TEMPERATURE: 97 F | HEART RATE: 74 BPM

## 2022-09-02 DIAGNOSIS — G47.33 OBSTRUCTIVE SLEEP APNEA (ADULT) (PEDIATRIC): ICD-10-CM

## 2022-09-02 DIAGNOSIS — Z98.890 OTHER SPECIFIED POSTPROCEDURAL STATES: Chronic | ICD-10-CM

## 2022-09-02 DIAGNOSIS — Z01.818 ENCOUNTER FOR OTHER PREPROCEDURAL EXAMINATION: ICD-10-CM

## 2022-09-02 DIAGNOSIS — R25.2 CRAMP AND SPASM: ICD-10-CM

## 2022-09-02 DIAGNOSIS — S06.9X9A UNSPECIFIED INTRACRANIAL INJURY WITH LOSS OF CONSCIOUSNESS OF UNSPECIFIED DURATION, INITIAL ENCOUNTER: Chronic | ICD-10-CM

## 2022-09-02 DIAGNOSIS — Z89.022 ACQUIRED ABSENCE OF LEFT FINGER(S): Chronic | ICD-10-CM

## 2022-09-02 LAB
ANION GAP SERPL CALC-SCNC: 12 MMOL/L — SIGNIFICANT CHANGE UP (ref 5–17)
BUN SERPL-MCNC: 16 MG/DL — SIGNIFICANT CHANGE UP (ref 7–23)
CALCIUM SERPL-MCNC: 9.7 MG/DL — SIGNIFICANT CHANGE UP (ref 8.4–10.5)
CHLORIDE SERPL-SCNC: 105 MMOL/L — SIGNIFICANT CHANGE UP (ref 96–108)
CO2 SERPL-SCNC: 26 MMOL/L — SIGNIFICANT CHANGE UP (ref 22–31)
CREAT SERPL-MCNC: 0.93 MG/DL — SIGNIFICANT CHANGE UP (ref 0.5–1.3)
EGFR: 97 ML/MIN/1.73M2 — SIGNIFICANT CHANGE UP
GLUCOSE SERPL-MCNC: 101 MG/DL — HIGH (ref 70–99)
HCT VFR BLD CALC: 45.9 % — SIGNIFICANT CHANGE UP (ref 39–50)
HGB BLD-MCNC: 14.8 G/DL — SIGNIFICANT CHANGE UP (ref 13–17)
MCHC RBC-ENTMCNC: 29.6 PG — SIGNIFICANT CHANGE UP (ref 27–34)
MCHC RBC-ENTMCNC: 32.2 GM/DL — SIGNIFICANT CHANGE UP (ref 32–36)
MCV RBC AUTO: 91.8 FL — SIGNIFICANT CHANGE UP (ref 80–100)
NRBC # BLD: 0 /100 WBCS — SIGNIFICANT CHANGE UP (ref 0–0)
PLATELET # BLD AUTO: 204 K/UL — SIGNIFICANT CHANGE UP (ref 150–400)
POTASSIUM SERPL-MCNC: 4 MMOL/L — SIGNIFICANT CHANGE UP (ref 3.5–5.3)
POTASSIUM SERPL-SCNC: 4 MMOL/L — SIGNIFICANT CHANGE UP (ref 3.5–5.3)
RBC # BLD: 5 M/UL — SIGNIFICANT CHANGE UP (ref 4.2–5.8)
RBC # FLD: 14.4 % — SIGNIFICANT CHANGE UP (ref 10.3–14.5)
SODIUM SERPL-SCNC: 143 MMOL/L — SIGNIFICANT CHANGE UP (ref 135–145)
WBC # BLD: 5.42 K/UL — SIGNIFICANT CHANGE UP (ref 3.8–10.5)
WBC # FLD AUTO: 5.42 K/UL — SIGNIFICANT CHANGE UP (ref 3.8–10.5)

## 2022-09-02 PROCEDURE — G0463: CPT

## 2022-09-02 PROCEDURE — 36415 COLL VENOUS BLD VENIPUNCTURE: CPT

## 2022-09-02 PROCEDURE — 80048 BASIC METABOLIC PNL TOTAL CA: CPT

## 2022-09-02 PROCEDURE — 85027 COMPLETE CBC AUTOMATED: CPT

## 2022-09-02 RX ORDER — DOCUSATE SODIUM 100 MG
1 CAPSULE ORAL
Qty: 0 | Refills: 0 | DISCHARGE

## 2022-09-02 RX ORDER — CEFAZOLIN SODIUM 1 G
2000 VIAL (EA) INJECTION ONCE
Refills: 0 | Status: DISCONTINUED | OUTPATIENT
Start: 2022-09-19 | End: 2022-10-03
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
## 2022-09-02 NOTE — H&P PST ADULT - MUSCULOSKELETAL COMMENTS
BLE in leg braces Restleg syndrome motorized wheelchair. Restless leg syndrome motorized wheelchair.
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
## 2022-09-02 NOTE — H&P PST ADULT - PROBLEM SELECTOR PLAN 1
Scheduled for Baclofen pump implantation on 9/19 with Dr. Barraza  Preop instructions and chlorhexidine soap given  Labs CBC BMP performed in PST
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
## 2022-09-03 NOTE — PHYSICAL EXAM
[General Appearance - Alert] : alert [General Appearance - In No Acute Distress] : in no acute distress [Oriented To Time, Place, And Person] : oriented to person, place, and time [Impaired Insight] : insight and judgment were intact [Affect] : the affect was normal [FreeTextEntry6] : paraplegia on motor wheelchair

## 2022-09-09 ENCOUNTER — APPOINTMENT (OUTPATIENT)
Dept: ULTRASOUND IMAGING | Facility: CLINIC | Age: 57
End: 2022-09-09

## 2022-09-09 LAB
AMPHET UR-MCNC: NEGATIVE
BARBITURATES UR-MCNC: NEGATIVE
BENZODIAZ UR-MCNC: NEGATIVE
COCAINE METAB.OTHER UR-MCNC: NEGATIVE
CREATININE, URINE: 59.3 MG/DL
METHADONE UR-MCNC: NEGATIVE
METHAQUALONE UR-MCNC: NEGATIVE
OPIATES UR-MCNC: NEGATIVE
PCP UR-MCNC: NEGATIVE
PROPOXYPH UR QL: NEGATIVE
THC UR QL: NEGATIVE

## 2022-09-15 NOTE — H&P PST ADULT - PSYCHIATRIC
Emergency Department      Chief Complaint   Patient presents with   • Syncope   • Dizziness        HPI:     Patient is a 76-year-old female with reactive airway disease, CKD, hypertension, anxiety, IBS presenting to the emergency department for an episode of dizziness this morning.  She reports that this morning she woke up from bed, she got up to go to the bathroom and she felt a sudden onset of room spinning dizziness.  She states that she sat down and it resolved, she reports that since then when she moves in certain ways or turns her head in various directions she feels the onset of room spinning sensation.  She denies any other symptoms associated with this other than some mild congestion and feeling like her right ear is congested.  She denies any hearing loss, visual changes, focal weakness or deficits, inability to walk.  She has no chest pain or shortness of breath.    Review of Systems   Constitutional: Negative for chills and fever.   HENT: Positive for congestion. Negative for voice change.    Eyes: Negative for photophobia and visual disturbance.   Respiratory: Negative for cough and shortness of breath.    Cardiovascular: Negative for chest pain and palpitations.   Gastrointestinal: Negative for abdominal pain, nausea and vomiting.   Genitourinary: Negative for difficulty urinating and dysuria.   Musculoskeletal: Negative for arthralgias, neck pain and neck stiffness.   Skin: Negative for rash and wound.   Neurological: Positive for dizziness. Negative for facial asymmetry, speech difficulty, weakness, numbness and headaches.   Psychiatric/Behavioral: Negative for behavioral problems and confusion.          PAST MEDICAL HISTORY:    Anxiety                                                       RAD (reactive airway disease)                                 CKD (chronic kidney disease)                                  Essential (primary) hypertension                              IBS (irritable bowel  syndrome)                                Allergy                                                       Colon polyps                                    02/22/2022      Comment: 5 yr fu colonoscopy due Feb 22, 2027    Abdominal cramping                                            History of diarrhea                                           Chronic abdominal pain                                        Obese                                                         Gastroesophageal reflux disease                               Family history of colon cancer                                Asthma                                                        Sinusitis, chronic                                            Arthritis                                                     PAST SURGICAL HISTORY:    BREAST SURGERY                                                COLONOSCOPY W/ POLYPECTOMY                      02/22/2022      Comment: 5 yr fu colonoscopy due Feb 22, 2027    HYSTERECTOMY                                                    Comment: Partial    TOE SURGERY                                                   LAPAROSCOPY, CHOLECYSTECTOMY                                  Social History     Tobacco Use   • Smoking status: Never Smoker   • Smokeless tobacco: Never Used   Vaping Use   • Vaping Use: never used   Substance Use Topics   • Alcohol use: No   • Drug use: No        Family History   Problem Relation Age of Onset   • Cancer Father    • Cancer, Colon Sister    • Cancer Sister    • Liver Disease Brother        ED Triage Vitals [09/15/22 0933]   Enc Vitals Group      /78      Heart Rate (!) 53      Resp 16      Temp 97.6 °F (36.4 °C)      Temp src Oral      SpO2 98 %      Weight 202 lb (91.6 kg)      Height       Head Circumference       Peak Flow       Pain Score       Pain Loc       Pain Edu?       Excl. in GC?         Physical Exam  Vitals and nursing note reviewed.   Constitutional:       General: She is not in  acute distress.     Appearance: Normal appearance.   HENT:      Head: Normocephalic and atraumatic.      Right Ear: External ear normal.      Left Ear: External ear normal.      Nose: Nose normal.      Mouth/Throat:      Mouth: Mucous membranes are moist.      Pharynx: Oropharynx is clear.   Eyes:      Conjunctiva/sclera: Conjunctivae normal.      Pupils: Pupils are equal, round, and reactive to light.   Cardiovascular:      Rate and Rhythm: Normal rate and regular rhythm.   Pulmonary:      Effort: Pulmonary effort is normal.      Breath sounds: Normal breath sounds.   Abdominal:      General: Abdomen is flat.      Palpations: Abdomen is soft.      Tenderness: There is no abdominal tenderness.   Musculoskeletal:         General: No swelling. Normal range of motion.      Cervical back: Neck supple. No rigidity.   Skin:     General: Skin is warm and dry.   Neurological:      General: No focal deficit present.      Mental Status: She is alert and oriented to person, place, and time.      Comments: Face is symmetric and speech is normal.  There is no pronator drift, there is intact finger-to-nose exam bilaterally.  She has 5 out of 5 muscle strength in all 4 extremities.  There is no obvious nystagmus   Psychiatric:         Mood and Affect: Mood normal.         Behavior: Behavior normal.              Labwork:    Results for orders placed or performed during the hospital encounter of 09/15/22   Comprehensive Metabolic Panel   Result Value    Fasting Status     Sodium 143    Potassium 3.2 (L)    Chloride 105    Carbon Dioxide 30    Anion Gap 11    Glucose 124 (H)    BUN 14    Creatinine 0.95    Glomerular Filtration Rate 63     Comment: eGFR results = or >60 mL/min/1.73m2 = Normal kidney function. Estimated GFR calculated using the CKD-EPI-R (2021) equation that does not include race in the creatinine calculation.    BUN/ Creatinine Ratio 15    Calcium 9.1    Bilirubin, Total 0.4    GOT/AST 14    GPT/ALT 25    Alkaline  Phosphatase 68    Albumin 3.5 (L)    Protein, Total 6.6    Globulin 3.1    A/G Ratio 1.1   CBC with Automated Differential (performable only)   Result Value    WBC 9.6    RBC 4.08    HGB 12.7    HCT 38.4    MCV 94.1    MCH 31.1    MCHC 33.1    RDW-CV 11.9    RDW-SD 41.1        NRBC 0   Manual Differential   Result Value    Neutrophil, Percent 21    Lymphocytes, Percent 63    Mono, Percent 11    Eosinophils, Percent 5    Basophils, Percent 0    Absolute Neutrophil 2.0    Absolute Lymphocytes 6.0 (H)    Absolute Monocytes 1.1 (H)    Absolute Eosinophils 0.5    Absolute Basophils 0.0    RBC Morphology Normal    WBC Morphology Normal    Platelet Morphology Normal   Pathology Smear Review, Blood   Result Value    Pathologist comments See Comment.    Results Needing Review by Provider Yes (A)   Magnesium   Result Value    Magnesium 1.8   TROPONIN I, HIGH SENSITIVITY   Result Value    Troponin I, High Sensitivity 12   COVID/Flu/RSV panel   Result Value    Rapid SARS-COV-2 by PCR Not Detected    Influenza A by PCR Not Detected    Influenza B by PCR Not Detected    RSV BY PCR Not Detected    Isolation Guidelines      Comment: Do not use this test result as the sole decision-maker for discontinuation of isolation.   Clinical evaluation should be considered for other respiratory illness requiring transmission-based isolation.    -    No fever (<99.0 F/37.2 C) for at least 24 hours without the use of fever-reducing medications    AND  -    Respiratory symptoms have improved or resolved (e.g. cough, shortness of breath)     AND  -    COVID-19 negative test    See COVID-19 Deisolation Resource Guide    Procedural Comment      Comment: SARS-COV-2 nucleic acid has not been detected indicating the absence of COVID-19.    This test was performed using the [employer] Xpert Xpress SARS-CoV-2/Flu/RSV RT-PCR test that has been given Emergency Use Authorization (EUA) by the [place] Food and Drug Administration (FDA).  These  tests are considered definitive and do not need to be confirmed by another method.        Imaging Results          CTA HEAD AND NECK W CONTRAST (Final result)  Result time 09/15/22 21:37:48    Final result                 Impression:      No large arterial occlusions or significant stenoses identified in the head  or neck.    Electronically Signed by: DEWAYNE LOVING M.D.   Signed on: 9/15/2022 9:37 PM                Narrative:    EXAM: CTA HEAD AND NECK W CONTRAST    HISTORY: Dizziness, non-specific     TECHNIQUE: CT angiography of the head and neck was obtained after the  uneventful administration of 70 mL Omnipaque 350 intravenous contrast.  Three dimensional postprocessing was performed by the technologist and sent  to the workstation for review.    COMPARISON: CT head exam obtained earlier in the day    FINDINGS:     Non-angiographic findings:     No soft tissue abnormalities are identified in the neck.    Angiographic findings:    The visualized aortic arch appears normal. The configuration of the  brachiocephalic vessels is typical. The innominate artery and both  subclavian arteries appear normal. The common carotid arteries appear  normal. There is atherosclerotic disease at the carotid bifurcations  extending into the origin of the internal carotid arteries without  significant focal stenosis. The cervical internal carotid arteries appear  normal. The cervical vertebral arteries appear normal.    There is atherosclerotic disease involving the distal internal carotid  arteries without significant focal stenosis. The anterior and middle  cerebral arteries appear normal. The distal vertebral arteries appear  normal. The basilar artery and posterior cerebral arteries appear normal.  No aneurysms, vascular occlusions, or intracranial stenoses are identified.                                   CT HEAD WO CONTRAST (Final result)  Result time 09/15/22 21:22:48    Final result                 Impression:      No  acute intracranial hemorrhage.  No calvarial fracture.    Electronically Signed by: DEWAYNE LOVING M.D.   Signed on: 9/15/2022 9:22 PM                Narrative:    EXAM: CT HEAD WO CONTRAST    CLINICAL INDICATION: Head trauma, mod-severe.   Syncope.    COMPARISON: None.    TECHNIQUE: Axial CT images of the head were obtained from the base of the  skull to the vertex. No contrast was administered. Sagittal and coronal  reformats were created.     FINDINGS:     Scalp is unremarkable. The calvarium is intact.    No evidence of acute intracranial hemorrhage. No mass effect, midline  shift, or herniation.      Periventricular and subcortical white matter hypoattenuation, nonspecific  but favored to reflect chronic microvascular ischemic change. Gray-white  differentiation is otherwise preserved.     Age-appropriate appearance of the ventricles and sulci. Ventricles are  unremarkable.  Basal cisterns are patent.     No more than mild scattered mucosal thickening within the visualized  paranasal sinuses. Mastoid air cells are clear.     Right staphyloma.  No acute orbital abnormality.     Intracranial arterial vascular calcifications are noted.                               XR CHEST PA OR AP 1 VIEW (Final result)  Result time 09/15/22 09:57:21    Final result                 Impression:    Impression: No acute cardiopulmonary disease.    Electronically Signed by: CIRILO STEELE MD   Signed on: 9/15/2022 9:57 AM                Narrative:      Exam: AP chest film was performed on 09/15/2022 at 0942 hours.    Clinical Indication: Chest pain    Comparison: Chest film on 12/6/21    Findings:   The heart and vessels are unremarkable. The lungs are clear. The  costophrenic angles are sharp.  No pneumothorax identified.  No  abnormalities are seen on limited evaluation of the bones.                                  Medications   sodium chloride 0.9 % flush bag 25 mL (has no administration in time range)   sodium chloride (PF)  0.9 % injection 2 mL (has no administration in time range)   ondansetron (ZOFRAN) injection 4 mg (has no administration in time range)   Magnesium Standard Replacement Protocol (has no administration in time range)   ondansetron (ZOFRAN) injection 4 mg (has no administration in time range)   prochlorperazine (COMPAZINE) injection 5 mg (has no administration in time range)   acetaminophen (TYLENOL) tablet 650 mg (has no administration in time range)   polyethylene glycol (MIRALAX) packet 17 g (has no administration in time range)   docusate sodium-sennosides (SENOKOT S) 50-8.6 MG 2 tablet (has no administration in time range)   sodium chloride 0.9 % flush bag 25 mL (has no administration in time range)   traMADol (ULTRAM) tablet 50 mg (has no administration in time range)   meclizine (ANTIVERT) tablet 25 mg (has no administration in time range)   albuterol inhaler 2 puff (has no administration in time range)   albuterol (VENTOLIN) nebulizer 2.5 mg (has no administration in time range)   ALPRAZolam (XANAX) tablet 0.5 mg (has no administration in time range)   amLODIPine (NORVASC) tablet 10 mg (has no administration in time range)   fluticasone-vilanterol (BREO ELLIPTA) 100-25 MCG/INH inhaler 1 puff (has no administration in time range)   montelukast (SINGULAIR) tablet 10 mg (has no administration in time range)   atorvastatin (LIPITOR) tablet 10 mg (has no administration in time range)   pantoprazole (PROTONIX) EC tablet 40 mg (has no administration in time range)   hydrALAZINE (APRESOLINE) tablet 25 mg (has no administration in time range)   influenza virus quadrivalent vaccine inactivated (FLUZONE HIGH-DOSE QUAD) injection 0.7 mL (has no administration in time range)   potassium CHLORIDE (KLOR-CON) packet 40 mEq (40 mEq Oral Given 9/15/22 1524)   meclizine (ANTIVERT) tablet 25 mg (25 mg Oral Given 9/15/22 1524)   lactated ringers bolus 1,000 mL (0 mLs Intravenous Completed 9/15/22 1645)   iohexol (OMNIPAQUE 350  INJECT) contrast solution 70 mL (70 mLs Intravenous Given 9/15/22 2121)        Vitals:    09/15/22 2000 09/15/22 2005 09/15/22 2025 09/15/22 2105   BP:   (!) 154/68    BP Location:       Patient Position:       Pulse:  68 63 69   Resp:  18 18 19   Temp:       TempSrc:       SpO2:   99% 98%   Weight: 91.6 kg (202 lb)      Height: 5' 3.5\" (1.613 m)          MDM:       ED Course as of 09/15/22 2143   Thu Sep 15, 2022   1428    IMPRESSION:  Impression: No acute cardiopulmonary disease.     Electronically Signed by: CIRILO STEELE MD   Signed on: 9/15/2022 9:57 AM  [RL]   1459 EKG interpreted by me showing sinus rhythm at 55 bpm, QTC is 451, flattened T waves throughout, there is no ST elevation or depression [RL]   1512 Patient with mild hypokalemia to 3.2, troponin and labs are otherwise essentially normal.  On examination patient has positional vertigo with some congestion, the vertigo is worse when looking to the right, she has no cerebellar findings on physical exam.  This is consistent with peripheral vertigo, will give meclizine, replace potassium and reevaluate [RL]   1722 Patient walked with nursing staff to and from the bathroom without any difficulty.  Patient was able to eat here without any problems.  I believe this is peripheral vertigo, I have given her meclizine to her pharmacy which she was instructed to take.  I discussed return precautions, I feel the patient stable for discharge home. [RL]   1818 During discharge patient states that she felt dizzy, she feels that she is not able to go home because she takes care of her  who needs all of her care, she is afraid that she will fall at home.  Requesting admission. [RL]   2143    IMPRESSION:     No large arterial occlusions or significant stenoses identified in the head  or neck.     Electronically Signed by: DEWAYNE LOVING M.D.   Signed on: 9/15/2022 9:37 PM  [RL]      ED Course User Index  [RL] Saurav Sawyer,          ED Diagnosis      Diagnosis Comment Associated Orders       Final diagnosis    Vertigo -- --           Kiara Bravo MD  [address]  [facility] [id number] [phone]    In 2 days           Summary of your Discharge Medications      Take these Medications      Details   meclizine 25 MG tablet  Commonly known as: ANTIVERT   Take 1 tablet by mouth 3 times daily as needed for Dizziness.        ASK your doctor about these medications      Details   albuterol 108 (90 Base) MCG/ACT inhaler  Commonly known as: Ventolin HFA  Ask about: Which instructions should I use?   INHALE 1 TO 2 PUFFS EVERY 6 HOURS AS NEEDED     fluticasone-salmeterol 250-50 MCG/ACT inhaler  Commonly known as: Advair Diskus  Ask about: Which instructions should I use?   Inhale 1 puff into the lungs 2 times daily.                Saurav Sawyer, DO  09/15/22 1728       Saurav Sawyer, DO  09/15/22 2144     details… alert and oriented x3/normal behavior

## 2022-09-18 ENCOUNTER — TRANSCRIPTION ENCOUNTER (OUTPATIENT)
Age: 57
End: 2022-09-18

## 2022-09-19 ENCOUNTER — OUTPATIENT (OUTPATIENT)
Dept: INPATIENT UNIT | Facility: HOSPITAL | Age: 57
LOS: 1 days | End: 2022-09-19
Payer: MEDICARE

## 2022-09-19 ENCOUNTER — TRANSCRIPTION ENCOUNTER (OUTPATIENT)
Age: 57
End: 2022-09-19

## 2022-09-19 ENCOUNTER — APPOINTMENT (OUTPATIENT)
Dept: NEUROSURGERY | Facility: HOSPITAL | Age: 57
End: 2022-09-19

## 2022-09-19 VITALS
HEART RATE: 71 BPM | OXYGEN SATURATION: 96 % | SYSTOLIC BLOOD PRESSURE: 131 MMHG | DIASTOLIC BLOOD PRESSURE: 78 MMHG | RESPIRATION RATE: 18 BRPM | TEMPERATURE: 97 F

## 2022-09-19 VITALS
OXYGEN SATURATION: 96 % | WEIGHT: 210.1 LBS | TEMPERATURE: 98 F | DIASTOLIC BLOOD PRESSURE: 81 MMHG | SYSTOLIC BLOOD PRESSURE: 128 MMHG | HEART RATE: 70 BPM | RESPIRATION RATE: 18 BRPM | HEIGHT: 72 IN

## 2022-09-19 DIAGNOSIS — R25.2 CRAMP AND SPASM: ICD-10-CM

## 2022-09-19 DIAGNOSIS — Z98.890 OTHER SPECIFIED POSTPROCEDURAL STATES: Chronic | ICD-10-CM

## 2022-09-19 DIAGNOSIS — Z89.022 ACQUIRED ABSENCE OF LEFT FINGER(S): Chronic | ICD-10-CM

## 2022-09-19 DIAGNOSIS — S06.9X9A UNSPECIFIED INTRACRANIAL INJURY WITH LOSS OF CONSCIOUSNESS OF UNSPECIFIED DURATION, INITIAL ENCOUNTER: Chronic | ICD-10-CM

## 2022-09-19 PROCEDURE — C9399: CPT

## 2022-09-19 PROCEDURE — 62362 IMPLANT SPINE INFUSION PUMP: CPT

## 2022-09-19 PROCEDURE — C1755: CPT

## 2022-09-19 PROCEDURE — 62350 IMPLANT SPINAL CANAL CATH: CPT

## 2022-09-19 PROCEDURE — 76000 FLUOROSCOPY <1 HR PHYS/QHP: CPT

## 2022-09-19 PROCEDURE — C1772: CPT

## 2022-09-19 DEVICE — SYNCROMED II INFUSION PUMP 20CC: Type: IMPLANTABLE DEVICE | Status: FUNCTIONAL

## 2022-09-19 DEVICE — CATH ASCENDA INTRATHECAL SHORT: Type: IMPLANTABLE DEVICE | Status: FUNCTIONAL

## 2022-09-19 RX ORDER — ONDANSETRON 8 MG/1
4 TABLET, FILM COATED ORAL ONCE
Refills: 0 | Status: DISCONTINUED | OUTPATIENT
Start: 2022-09-19 | End: 2022-09-19

## 2022-09-19 RX ORDER — SODIUM CHLORIDE 9 MG/ML
3 INJECTION INTRAMUSCULAR; INTRAVENOUS; SUBCUTANEOUS EVERY 8 HOURS
Refills: 0 | Status: DISCONTINUED | OUTPATIENT
Start: 2022-09-19 | End: 2022-09-19

## 2022-09-19 RX ORDER — CEPHALEXIN 500 MG
1 CAPSULE ORAL
Qty: 10 | Refills: 0
Start: 2022-09-19 | End: 2022-09-23

## 2022-09-19 RX ORDER — LIDOCAINE HCL 20 MG/ML
0.2 VIAL (ML) INJECTION ONCE
Refills: 0 | Status: DISCONTINUED | OUTPATIENT
Start: 2022-09-19 | End: 2022-09-19

## 2022-09-19 RX ORDER — BACLOFEN 100 %
2000 POWDER (GRAM) MISCELLANEOUS ONCE
Refills: 0 | Status: DISCONTINUED | OUTPATIENT
Start: 2022-09-19 | End: 2022-09-19

## 2022-09-19 RX ORDER — CHLORHEXIDINE GLUCONATE 213 G/1000ML
1 SOLUTION TOPICAL ONCE
Refills: 0 | Status: DISCONTINUED | OUTPATIENT
Start: 2022-09-19 | End: 2022-09-19

## 2022-09-19 RX ORDER — OXYCODONE AND ACETAMINOPHEN 5; 325 MG/1; MG/1
1 TABLET ORAL
Qty: 12 | Refills: 0
Start: 2022-09-19

## 2022-09-19 RX ORDER — BACLOFEN 100 %
10000 POWDER (GRAM) MISCELLANEOUS ONCE
Refills: 0 | Status: DISCONTINUED | OUTPATIENT
Start: 2022-09-19 | End: 2022-09-19

## 2022-09-19 RX ORDER — HYDROMORPHONE HYDROCHLORIDE 2 MG/ML
0.5 INJECTION INTRAMUSCULAR; INTRAVENOUS; SUBCUTANEOUS
Refills: 0 | Status: DISCONTINUED | OUTPATIENT
Start: 2022-09-19 | End: 2022-09-19

## 2022-09-19 NOTE — BRIEF OPERATIVE NOTE - NSICDXBRIEFPROCEDURE_GEN_ALL_CORE_FT
PROCEDURES:  Insertion of baclofen pump with intrathecal catheter 19-Sep-2022 10:22:35  Paresh Sanchez

## 2022-09-19 NOTE — ASU DISCHARGE PLAN (ADULT/PEDIATRIC) - NURSING INSTRUCTIONS
******************************************************************************************  Next dose of TYLENOL may be taken at or after _____________ PM if needed. DO NOT take any additional products containing TYLENOL or ACETAMINOPHEN, such as VICODIN, PERCOCET, NORCO, EXCEDRIN, and any over-the-counter cold medications until this time. DO NOT CONSUME MORE THAN 2708-4147 MG of TYLENOL (acetaminophen) in a 24-hour period.

## 2022-09-19 NOTE — ASU DISCHARGE PLAN (ADULT/PEDIATRIC) - CARE PROVIDER_API CALL
Janee Barraza)  Neurosurgery  [address], [address]  [place] [address]  Phone: [phone]  Fax: [phone]  Established Patient  Follow Up Time: 2 weeks

## 2022-09-19 NOTE — BRIEF OPERATIVE NOTE - ASSISTANT(S)
Laura Quality 130: Documentation Of Current Medications In The Medical Record: Current Medications Documented Detail Level: Detailed Quality 131: Pain Assessment And Follow-Up: Pain assessment using a standardized tool is documented as negative, no follow-up plan required

## 2022-09-19 NOTE — ASU PATIENT PROFILE, ADULT - FALL HARM RISK - HARM RISK INTERVENTIONS

## 2022-09-19 NOTE — ASU DISCHARGE PLAN (ADULT/PEDIATRIC) - CALL YOUR DOCTOR IF YOU HAVE ANY OF THE FOLLOWING:
Fever greater than (need to indicate Fahrenheit or Celsius)/Wound/Surgical Site with redness, or foul smelling discharge or pus Swelling that gets worse/Pain not relieved by Medications/Fever greater than (need to indicate Fahrenheit or Celsius)/Wound/Surgical Site with redness, or foul smelling discharge or pus/Numbness, tingling, color or temperature change to extremity

## 2022-09-20 PROBLEM — R25.2 SPASTICITY: Status: ACTIVE | Noted: 2022-04-14

## 2022-09-20 NOTE — REASON FOR VISIT
[Home] : at home, [unfilled] , at the time of the visit. [Medical Office: ([facility])___] : at the medical office located in  [Patient] : the patient [New Patient Visit] : a new patient visit
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
## 2022-09-30 ENCOUNTER — RX RENEWAL (OUTPATIENT)
Age: 57
End: 2022-09-30

## 2022-10-05 ENCOUNTER — APPOINTMENT (OUTPATIENT)
Dept: NEUROSURGERY | Facility: CLINIC | Age: 57
End: 2022-10-05

## 2022-10-05 ENCOUNTER — RX RENEWAL (OUTPATIENT)
Age: 57
End: 2022-10-05

## 2022-10-12 NOTE — ED PROVIDER NOTE - CROS ED ALLERGIC IMMUN ALL NEG
Problem: Adult Inpatient Plan of Care  Goal: Plan of Care Review  Outcome: Ongoing, Progressing  Goal: Patient-Specific Goal (Individualized)  Outcome: Ongoing, Progressing  Goal: Absence of Hospital-Acquired Illness or Injury  Outcome: Ongoing, Progressing  Goal: Optimal Comfort and Wellbeing  Outcome: Ongoing, Progressing  Goal: Readiness for Transition of Care  Outcome: Ongoing, Progressing     Problem: Adjustment to Illness (Stroke, Hemorrhagic)  Goal: Optimal Coping  Outcome: Ongoing, Progressing     Problem: Bowel Elimination Impaired (Stroke, Hemorrhagic)  Goal: Effective Bowel Elimination  Outcome: Ongoing, Progressing     Problem: Cerebral Tissue Perfusion (Stroke, Hemorrhagic)  Goal: Optimal Cerebral Tissue Perfusion  Outcome: Ongoing, Progressing     Problem: Cognitive Impairment (Stroke, Hemorrhagic)  Goal: Optimal Cognitive Function  Outcome: Ongoing, Progressing     Problem: Communication Impairment (Stroke, Hemorrhagic)  Goal: Effective Communication Skills  Outcome: Ongoing, Progressing     Problem: Functional Ability Impaired (Stroke, Hemorrhagic)  Goal: Optimal Functional Ability  Outcome: Ongoing, Progressing     Problem: Pain (Stroke, Hemorrhagic)  Goal: Acceptable Pain Control  Outcome: Ongoing, Progressing     Problem: Respiratory Compromise (Stroke, Hemorrhagic)  Goal: Effective Oxygenation and Ventilation  Outcome: Ongoing, Progressing     Problem: Sensorimotor Impairment (Stroke, Hemorrhagic)  Goal: Improved Sensorimotor Function  Outcome: Ongoing, Progressing     Problem: Swallowing Impairment (Stroke, Hemorrhagic)  Goal: Optimal Eating and Swallowing Without Aspiration  Outcome: Ongoing, Progressing     Problem: Urinary Elimination Impaired (Stroke, Hemorrhagic)  Goal: Effective Urinary Elimination  Outcome: Ongoing, Progressing     Problem: Skin Injury Risk Increased  Goal: Skin Health and Integrity  Outcome: Ongoing, Progressing     Problem: Infection  Goal: Absence of Infection Signs  and Symptoms  Outcome: Ongoing, Progressing      negative...

## 2022-10-17 ENCOUNTER — APPOINTMENT (OUTPATIENT)
Dept: NEUROSURGERY | Facility: CLINIC | Age: 57
End: 2022-10-17

## 2022-10-18 ENCOUNTER — APPOINTMENT (OUTPATIENT)
Dept: NEUROSURGERY | Facility: CLINIC | Age: 57
End: 2022-10-18

## 2022-10-19 ENCOUNTER — APPOINTMENT (OUTPATIENT)
Dept: CARDIOLOGY | Facility: CLINIC | Age: 57
End: 2022-10-19

## 2022-10-19 ENCOUNTER — INPATIENT (INPATIENT)
Facility: HOSPITAL | Age: 57
LOS: 5 days | Discharge: HOME | DRG: 86 | End: 2022-10-25
Attending: SURGERY | Admitting: SURGERY
Payer: MEDICARE

## 2022-10-19 VITALS — HEIGHT: 72 IN | WEIGHT: 229.94 LBS

## 2022-10-19 DIAGNOSIS — Z98.890 OTHER SPECIFIED POSTPROCEDURAL STATES: Chronic | ICD-10-CM

## 2022-10-19 DIAGNOSIS — Z89.022 ACQUIRED ABSENCE OF LEFT FINGER(S): Chronic | ICD-10-CM

## 2022-10-19 DIAGNOSIS — S06.9X9A UNSPECIFIED INTRACRANIAL INJURY WITH LOSS OF CONSCIOUSNESS OF UNSPECIFIED DURATION, INITIAL ENCOUNTER: Chronic | ICD-10-CM

## 2022-10-19 PROCEDURE — G1004: CPT

## 2022-10-19 PROCEDURE — 12001 RPR S/N/AX/GEN/TRNK 2.5CM/<: CPT

## 2022-10-19 PROCEDURE — 70450 CT HEAD/BRAIN W/O DYE: CPT | Mod: 26,MG

## 2022-10-19 PROCEDURE — 99291 CRITICAL CARE FIRST HOUR: CPT | Mod: 25

## 2022-10-19 RX ORDER — TETANUS TOXOID, REDUCED DIPHTHERIA TOXOID AND ACELLULAR PERTUSSIS VACCINE, ADSORBED 5; 2.5; 8; 8; 2.5 [IU]/.5ML; [IU]/.5ML; UG/.5ML; UG/.5ML; UG/.5ML
0.5 SUSPENSION INTRAMUSCULAR ONCE
Refills: 0 | Status: COMPLETED | OUTPATIENT
Start: 2022-10-19 | End: 2022-10-19
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
## 2022-10-19 NOTE — ED ADULT NURSE NOTE - NSIMPLEMENTINTERV_GEN_ALL_ED
Implemented All Fall Risk Interventions:  [place] to call system. Call bell, personal items and telephone within reach. Instruct patient to call for assistance. Room bathroom lighting operational. Non-slip footwear when patient is off stretcher. Physically safe environment: no spills, clutter or unnecessary equipment. Stretcher in lowest position, wheels locked, appropriate side rails in place. Provide visual cue, wrist band, yellow gown, etc. Monitor gait and stability. Monitor for mental status changes and reorient to person, place, and time. Review medications for side effects contributing to fall risk. Reinforce activity limits and safety measures with patient and family.
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
## 2022-10-20 DIAGNOSIS — S06.6XAA TRAUMATIC SUBARACHNOID HEMORRHAGE WITH LOSS OF CONSCIOUSNESS STATUS UNKNOWN, INITIAL ENCOUNTER: ICD-10-CM

## 2022-10-20 LAB
ALBUMIN SERPL ELPH-MCNC: 4.2 G/DL — SIGNIFICANT CHANGE UP (ref 3.3–5.2)
ALP SERPL-CCNC: 78 U/L — SIGNIFICANT CHANGE UP (ref 40–120)
ALT FLD-CCNC: 16 U/L — SIGNIFICANT CHANGE UP
ANION GAP SERPL CALC-SCNC: 10 MMOL/L — SIGNIFICANT CHANGE UP (ref 5–17)
ANION GAP SERPL CALC-SCNC: 12 MMOL/L — SIGNIFICANT CHANGE UP (ref 5–17)
ANION GAP SERPL CALC-SCNC: 12 MMOL/L — SIGNIFICANT CHANGE UP (ref 5–17)
APPEARANCE UR: CLEAR — SIGNIFICANT CHANGE UP
APTT BLD: 32 SEC — SIGNIFICANT CHANGE UP (ref 27.5–35.5)
AST SERPL-CCNC: 22 U/L — SIGNIFICANT CHANGE UP
BACTERIA # UR AUTO: ABNORMAL
BASOPHILS # BLD AUTO: 0.07 K/UL — SIGNIFICANT CHANGE UP (ref 0–0.2)
BASOPHILS NFR BLD AUTO: 1.3 % — SIGNIFICANT CHANGE UP (ref 0–2)
BILIRUB SERPL-MCNC: 0.2 MG/DL — LOW (ref 0.4–2)
BILIRUB UR-MCNC: NEGATIVE — SIGNIFICANT CHANGE UP
BLD GP AB SCN SERPL QL: SIGNIFICANT CHANGE UP
BUN SERPL-MCNC: 8.9 MG/DL — SIGNIFICANT CHANGE UP (ref 8–20)
BUN SERPL-MCNC: 9.1 MG/DL — SIGNIFICANT CHANGE UP (ref 8–20)
BUN SERPL-MCNC: 9.8 MG/DL — SIGNIFICANT CHANGE UP (ref 8–20)
CALCIUM SERPL-MCNC: 8.4 MG/DL — SIGNIFICANT CHANGE UP (ref 8.4–10.5)
CALCIUM SERPL-MCNC: 8.9 MG/DL — SIGNIFICANT CHANGE UP (ref 8.4–10.5)
CALCIUM SERPL-MCNC: 9 MG/DL — SIGNIFICANT CHANGE UP (ref 8.4–10.5)
CHLORIDE SERPL-SCNC: 105 MMOL/L — SIGNIFICANT CHANGE UP (ref 98–107)
CHLORIDE SERPL-SCNC: 105 MMOL/L — SIGNIFICANT CHANGE UP (ref 98–107)
CHLORIDE SERPL-SCNC: 107 MMOL/L — SIGNIFICANT CHANGE UP (ref 98–107)
CO2 SERPL-SCNC: 25 MMOL/L — SIGNIFICANT CHANGE UP (ref 22–29)
CO2 SERPL-SCNC: 26 MMOL/L — SIGNIFICANT CHANGE UP (ref 22–29)
CO2 SERPL-SCNC: 27 MMOL/L — SIGNIFICANT CHANGE UP (ref 22–29)
COLOR SPEC: YELLOW — SIGNIFICANT CHANGE UP
CREAT SERPL-MCNC: 0.61 MG/DL — SIGNIFICANT CHANGE UP (ref 0.5–1.3)
CREAT SERPL-MCNC: 0.61 MG/DL — SIGNIFICANT CHANGE UP (ref 0.5–1.3)
CREAT SERPL-MCNC: 0.73 MG/DL — SIGNIFICANT CHANGE UP (ref 0.5–1.3)
DIFF PNL FLD: ABNORMAL
EGFR: 107 ML/MIN/1.73M2 — SIGNIFICANT CHANGE UP
EGFR: 113 ML/MIN/1.73M2 — SIGNIFICANT CHANGE UP
EGFR: 113 ML/MIN/1.73M2 — SIGNIFICANT CHANGE UP
EOSINOPHIL # BLD AUTO: 0.17 K/UL — SIGNIFICANT CHANGE UP (ref 0–0.5)
EOSINOPHIL NFR BLD AUTO: 3.1 % — SIGNIFICANT CHANGE UP (ref 0–6)
EPI CELLS # UR: SIGNIFICANT CHANGE UP
ETHANOL SERPL-MCNC: 19 MG/DL — HIGH (ref 0–9)
FLUAV AG NPH QL: SIGNIFICANT CHANGE UP
FLUBV AG NPH QL: SIGNIFICANT CHANGE UP
GLUCOSE SERPL-MCNC: 108 MG/DL — HIGH (ref 70–99)
GLUCOSE SERPL-MCNC: 141 MG/DL — HIGH (ref 70–99)
GLUCOSE SERPL-MCNC: 92 MG/DL — SIGNIFICANT CHANGE UP (ref 70–99)
GLUCOSE UR QL: NEGATIVE MG/DL — SIGNIFICANT CHANGE UP
HCT VFR BLD CALC: 43.4 % — SIGNIFICANT CHANGE UP (ref 39–50)
HCT VFR BLD CALC: 43.5 % — SIGNIFICANT CHANGE UP (ref 39–50)
HGB BLD-MCNC: 14.4 G/DL — SIGNIFICANT CHANGE UP (ref 13–17)
HGB BLD-MCNC: 14.6 G/DL — SIGNIFICANT CHANGE UP (ref 13–17)
IMM GRANULOCYTES NFR BLD AUTO: 0.9 % — SIGNIFICANT CHANGE UP (ref 0–0.9)
INR BLD: 0.93 RATIO — SIGNIFICANT CHANGE UP (ref 0.88–1.16)
KETONES UR-MCNC: NEGATIVE — SIGNIFICANT CHANGE UP
LEUKOCYTE ESTERASE UR-ACNC: ABNORMAL
LYMPHOCYTES # BLD AUTO: 1.52 K/UL — SIGNIFICANT CHANGE UP (ref 1–3.3)
LYMPHOCYTES # BLD AUTO: 27.5 % — SIGNIFICANT CHANGE UP (ref 13–44)
MAGNESIUM SERPL-MCNC: 1.7 MG/DL — SIGNIFICANT CHANGE UP (ref 1.6–2.6)
MAGNESIUM SERPL-MCNC: 1.9 MG/DL — SIGNIFICANT CHANGE UP (ref 1.6–2.6)
MCHC RBC-ENTMCNC: 29.8 PG — SIGNIFICANT CHANGE UP (ref 27–34)
MCHC RBC-ENTMCNC: 30.7 PG — SIGNIFICANT CHANGE UP (ref 27–34)
MCHC RBC-ENTMCNC: 33.2 GM/DL — SIGNIFICANT CHANGE UP (ref 32–36)
MCHC RBC-ENTMCNC: 33.6 GM/DL — SIGNIFICANT CHANGE UP (ref 32–36)
MCV RBC AUTO: 89.7 FL — SIGNIFICANT CHANGE UP (ref 80–100)
MCV RBC AUTO: 91.4 FL — SIGNIFICANT CHANGE UP (ref 80–100)
MONOCYTES # BLD AUTO: 0.6 K/UL — SIGNIFICANT CHANGE UP (ref 0–0.9)
MONOCYTES NFR BLD AUTO: 10.8 % — SIGNIFICANT CHANGE UP (ref 2–14)
NEUTROPHILS # BLD AUTO: 3.12 K/UL — SIGNIFICANT CHANGE UP (ref 1.8–7.4)
NEUTROPHILS NFR BLD AUTO: 56.4 % — SIGNIFICANT CHANGE UP (ref 43–77)
NITRITE UR-MCNC: NEGATIVE — SIGNIFICANT CHANGE UP
PH UR: 6.5 — SIGNIFICANT CHANGE UP (ref 5–8)
PHOSPHATE SERPL-MCNC: 1.9 MG/DL — LOW (ref 2.4–4.7)
PHOSPHATE SERPL-MCNC: 3.3 MG/DL — SIGNIFICANT CHANGE UP (ref 2.4–4.7)
PLATELET # BLD AUTO: 170 K/UL — SIGNIFICANT CHANGE UP (ref 150–400)
PLATELET # BLD AUTO: 186 K/UL — SIGNIFICANT CHANGE UP (ref 150–400)
POTASSIUM SERPL-MCNC: 3.6 MMOL/L — SIGNIFICANT CHANGE UP (ref 3.5–5.3)
POTASSIUM SERPL-MCNC: 3.6 MMOL/L — SIGNIFICANT CHANGE UP (ref 3.5–5.3)
POTASSIUM SERPL-MCNC: 3.8 MMOL/L — SIGNIFICANT CHANGE UP (ref 3.5–5.3)
POTASSIUM SERPL-SCNC: 3.6 MMOL/L — SIGNIFICANT CHANGE UP (ref 3.5–5.3)
POTASSIUM SERPL-SCNC: 3.6 MMOL/L — SIGNIFICANT CHANGE UP (ref 3.5–5.3)
POTASSIUM SERPL-SCNC: 3.8 MMOL/L — SIGNIFICANT CHANGE UP (ref 3.5–5.3)
PROT SERPL-MCNC: 6.8 G/DL — SIGNIFICANT CHANGE UP (ref 6.6–8.7)
PROT UR-MCNC: NEGATIVE — SIGNIFICANT CHANGE UP
PROTHROM AB SERPL-ACNC: 10.8 SEC — SIGNIFICANT CHANGE UP (ref 10.5–13.4)
RBC # BLD: 4.76 M/UL — SIGNIFICANT CHANGE UP (ref 4.2–5.8)
RBC # BLD: 4.84 M/UL — SIGNIFICANT CHANGE UP (ref 4.2–5.8)
RBC # FLD: 14.1 % — SIGNIFICANT CHANGE UP (ref 10.3–14.5)
RBC # FLD: 14.2 % — SIGNIFICANT CHANGE UP (ref 10.3–14.5)
RBC CASTS # UR COMP ASSIST: ABNORMAL /HPF (ref 0–4)
RSV RNA NPH QL NAA+NON-PROBE: SIGNIFICANT CHANGE UP
SARS-COV-2 RNA SPEC QL NAA+PROBE: SIGNIFICANT CHANGE UP
SODIUM SERPL-SCNC: 142 MMOL/L — SIGNIFICANT CHANGE UP (ref 135–145)
SODIUM SERPL-SCNC: 143 MMOL/L — SIGNIFICANT CHANGE UP (ref 135–145)
SODIUM SERPL-SCNC: 144 MMOL/L — SIGNIFICANT CHANGE UP (ref 135–145)
SP GR SPEC: 1.01 — SIGNIFICANT CHANGE UP (ref 1.01–1.02)
UROBILINOGEN FLD QL: NEGATIVE MG/DL — SIGNIFICANT CHANGE UP
WBC # BLD: 5.53 K/UL — SIGNIFICANT CHANGE UP (ref 3.8–10.5)
WBC # BLD: 7.9 K/UL — SIGNIFICANT CHANGE UP (ref 3.8–10.5)
WBC # FLD AUTO: 5.53 K/UL — SIGNIFICANT CHANGE UP (ref 3.8–10.5)
WBC # FLD AUTO: 7.9 K/UL — SIGNIFICANT CHANGE UP (ref 3.8–10.5)
WBC UR QL: ABNORMAL /HPF (ref 0–5)

## 2022-10-20 PROCEDURE — 71045 X-RAY EXAM CHEST 1 VIEW: CPT | Mod: 26

## 2022-10-20 PROCEDURE — 70496 CT ANGIOGRAPHY HEAD: CPT | Mod: 26

## 2022-10-20 PROCEDURE — 74018 RADEX ABDOMEN 1 VIEW: CPT | Mod: 26

## 2022-10-20 PROCEDURE — 70498 CT ANGIOGRAPHY NECK: CPT | Mod: 26

## 2022-10-20 PROCEDURE — 93010 ELECTROCARDIOGRAM REPORT: CPT

## 2022-10-20 PROCEDURE — 72131 CT LUMBAR SPINE W/O DYE: CPT | Mod: 26

## 2022-10-20 PROCEDURE — 70250 X-RAY EXAM OF SKULL: CPT | Mod: 26

## 2022-10-20 PROCEDURE — 72125 CT NECK SPINE W/O DYE: CPT | Mod: 26

## 2022-10-20 PROCEDURE — 70450 CT HEAD/BRAIN W/O DYE: CPT | Mod: 26,59

## 2022-10-20 RX ORDER — ACETAMINOPHEN 500 MG
650 TABLET ORAL EVERY 6 HOURS
Refills: 0 | Status: DISCONTINUED | OUTPATIENT
Start: 2022-10-20 | End: 2022-10-22

## 2022-10-20 RX ORDER — QUETIAPINE FUMARATE 200 MG/1
150 TABLET, FILM COATED ORAL AT BEDTIME
Refills: 0 | Status: DISCONTINUED | OUTPATIENT
Start: 2022-10-20 | End: 2022-10-25

## 2022-10-20 RX ORDER — SODIUM CHLORIDE 9 MG/ML
1000 INJECTION INTRAMUSCULAR; INTRAVENOUS; SUBCUTANEOUS
Refills: 0 | Status: DISCONTINUED | OUTPATIENT
Start: 2022-10-20 | End: 2022-10-20

## 2022-10-20 RX ORDER — LANOLIN ALCOHOL/MO/W.PET/CERES
3 CREAM (GRAM) TOPICAL AT BEDTIME
Refills: 0 | Status: DISCONTINUED | OUTPATIENT
Start: 2022-10-20 | End: 2022-10-25

## 2022-10-20 RX ORDER — SENNA PLUS 8.6 MG/1
2 TABLET ORAL AT BEDTIME
Refills: 0 | Status: DISCONTINUED | OUTPATIENT
Start: 2022-10-20 | End: 2022-10-25

## 2022-10-20 RX ORDER — THIAMINE MONONITRATE (VIT B1) 100 MG
100 TABLET ORAL DAILY
Refills: 0 | Status: DISCONTINUED | OUTPATIENT
Start: 2022-10-20 | End: 2022-10-25

## 2022-10-20 RX ORDER — POTASSIUM CHLORIDE 20 MEQ
10 PACKET (EA) ORAL ONCE
Refills: 0 | Status: COMPLETED | OUTPATIENT
Start: 2022-10-20 | End: 2022-10-20

## 2022-10-20 RX ORDER — MAGNESIUM SULFATE 500 MG/ML
2 VIAL (ML) INJECTION ONCE
Refills: 0 | Status: COMPLETED | OUTPATIENT
Start: 2022-10-20 | End: 2022-10-20

## 2022-10-20 RX ORDER — FOLIC ACID 0.8 MG
1 TABLET ORAL DAILY
Refills: 0 | Status: DISCONTINUED | OUTPATIENT
Start: 2022-10-20 | End: 2022-10-25

## 2022-10-20 RX ORDER — OXYCODONE HYDROCHLORIDE 5 MG/1
5 TABLET ORAL ONCE
Refills: 0 | Status: DISCONTINUED | OUTPATIENT
Start: 2022-10-20 | End: 2022-10-20

## 2022-10-20 RX ORDER — CHLORHEXIDINE GLUCONATE 213 G/1000ML
1 SOLUTION TOPICAL
Refills: 0 | Status: DISCONTINUED | OUTPATIENT
Start: 2022-10-20 | End: 2022-10-21

## 2022-10-20 RX ORDER — POTASSIUM PHOSPHATE, MONOBASIC POTASSIUM PHOSPHATE, DIBASIC 236; 224 MG/ML; MG/ML
30 INJECTION, SOLUTION INTRAVENOUS ONCE
Refills: 0 | Status: COMPLETED | OUTPATIENT
Start: 2022-10-20 | End: 2022-10-20

## 2022-10-20 RX ORDER — HYDROMORPHONE HYDROCHLORIDE 2 MG/ML
0.5 INJECTION INTRAMUSCULAR; INTRAVENOUS; SUBCUTANEOUS ONCE
Refills: 0 | Status: DISCONTINUED | OUTPATIENT
Start: 2022-10-20 | End: 2022-10-20

## 2022-10-20 RX ADMIN — SODIUM CHLORIDE 84 MILLILITER(S): 9 INJECTION INTRAMUSCULAR; INTRAVENOUS; SUBCUTANEOUS at 00:40

## 2022-10-20 RX ADMIN — HYDROMORPHONE HYDROCHLORIDE 0.5 MILLIGRAM(S): 2 INJECTION INTRAMUSCULAR; INTRAVENOUS; SUBCUTANEOUS at 20:41

## 2022-10-20 RX ADMIN — Medication 650 MILLIGRAM(S): at 03:50

## 2022-10-20 RX ADMIN — Medication 3 MILLIGRAM(S): at 21:56

## 2022-10-20 RX ADMIN — SODIUM CHLORIDE 84 MILLILITER(S): 9 INJECTION INTRAMUSCULAR; INTRAVENOUS; SUBCUTANEOUS at 10:11

## 2022-10-20 RX ADMIN — Medication 150 GRAM(S): at 05:53

## 2022-10-20 RX ADMIN — CHLORHEXIDINE GLUCONATE 1 APPLICATION(S): 213 SOLUTION TOPICAL at 06:33

## 2022-10-20 RX ADMIN — POTASSIUM PHOSPHATE, MONOBASIC POTASSIUM PHOSPHATE, DIBASIC 83.33 MILLIMOLE(S): 236; 224 INJECTION, SOLUTION INTRAVENOUS at 18:39

## 2022-10-20 RX ADMIN — Medication 650 MILLIGRAM(S): at 23:58

## 2022-10-20 RX ADMIN — QUETIAPINE FUMARATE 150 MILLIGRAM(S): 200 TABLET, FILM COATED ORAL at 21:56

## 2022-10-20 RX ADMIN — OXYCODONE HYDROCHLORIDE 5 MILLIGRAM(S): 5 TABLET ORAL at 10:11

## 2022-10-20 RX ADMIN — TETANUS TOXOID, REDUCED DIPHTHERIA TOXOID AND ACELLULAR PERTUSSIS VACCINE, ADSORBED 0.5 MILLILITER(S): 5; 2.5; 8; 8; 2.5 SUSPENSION INTRAMUSCULAR at 00:17

## 2022-10-20 RX ADMIN — Medication 100 MILLIEQUIVALENT(S): at 06:26

## 2022-10-20 RX ADMIN — QUETIAPINE FUMARATE 150 MILLIGRAM(S): 200 TABLET, FILM COATED ORAL at 03:08

## 2022-10-20 RX ADMIN — Medication 650 MILLIGRAM(S): at 18:01

## 2022-10-20 RX ADMIN — Medication 1 MILLIGRAM(S): at 12:01

## 2022-10-20 RX ADMIN — Medication 650 MILLIGRAM(S): at 04:20

## 2022-10-20 RX ADMIN — Medication 100 MILLIGRAM(S): at 12:00

## 2022-10-20 RX ADMIN — OXYCODONE HYDROCHLORIDE 5 MILLIGRAM(S): 5 TABLET ORAL at 11:10

## 2022-10-20 RX ADMIN — Medication 1 TABLET(S): at 12:00

## 2022-10-20 RX ADMIN — Medication 650 MILLIGRAM(S): at 19:04
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
## 2022-10-20 NOTE — ED PROVIDER NOTE - PHYSICAL EXAMINATION
Gen: No acute distress, non toxic  HEENT: Mucous membranes moist, pink conjunctivae, EOMI. lac to left eyebrow  NEck: no midline ttp  CV: RRR, nl s1/s2.  Resp: CTAB, normal rate and effort  GI: Abdomen soft, NT, ND. No rebound, no guarding  : No CVAT  Neuro: A&O x 3, not moving LE  MSK: No spine or joint tenderness to palpation  Skin: No rashes. intact and perfused.

## 2022-10-20 NOTE — H&P ADULT - NSHPPHYSICALEXAM_GEN_ALL_CORE
Vital Signs Last 24 Hrs  T(C): 36.3 (19 Oct 2022 22:41), Max: 36.3 (19 Oct 2022 22:41)  T(F): 97.4 (19 Oct 2022 22:41), Max: 97.4 (19 Oct 2022 22:41)  HR: 75 (19 Oct 2022 22:41) (75 - 75)  BP: 126/87 (19 Oct 2022 22:41) (126/87 - 126/87)  BP(mean): --  RR: 19 (19 Oct 2022 22:41) (19 - 19)  SpO2: 95% (19 Oct 2022 22:41) (95% - 95%)    Parameters below as of 19 Oct 2022 22:41  Patient On (Oxygen Delivery Method): room air      PHYSICAL EXAM:  General: NAD  HEENT: Normocephalic, laceration of L frontal scalp  Neck: Soft, midline trachea, C-collar in place. C-spine tender to palpation  Chest: No chest wall tenderness, thorax stable, no ecchymosis.   Cardiac: S1, S2, RRR.   Respiratory: Bilateral breath sounds, clear and equal bilaterally.   Abdomen: Soft, non-distended, non-tender, no rebound, no guarding, no ecchymosis. Well healed mini laparotomy and G-tube scars  Pelvis: Stable, non-tender.   Ext: palp radial b/l UE, b/l DP palp in Lower Extrem.   Back: tender to palpation in L spine, no palpable runoff/stepoff/deformity, no abrasions.

## 2022-10-20 NOTE — SBIRT NOTE ADULT - NSSBIRTALCPASSREFTXDET_GEN_A_CORE
Provided SBIRT services: Full Screen Positive. Brief Intervention Performed and Referral to Treatment Attempted.  Screening results were reviewed with the patient and patient was provided information about healthy guidelines and   potential negative consequences associated with level of risk. Motivation and readiness to reduce or stop use was   discussed and goals and activities to make changes were suggested/offered.• Patient requires medical care pt is admitted.

## 2022-10-20 NOTE — H&P ADULT - NSHPLABSRESULTS_GEN_ALL_CORE
LABS:                       14.6   5.53  )-----------( 170      ( 19 Oct 2022 22:56 )             43.5     10-19    143  |  105  |  9.8  ----------------------------<  108<H>  3.8   |  26.0  |  0.73    Ca    9.0      19 Oct 2022 22:56    TPro  6.8  /  Alb  4.2  /  TBili  0.2<L>  /  DBili  x   /  AST  22  /  ALT  16  /  AlkPhos  78  10-19    PT/INR - ( 19 Oct 2022 22:56 )   PT: 10.8 sec;   INR: 0.93 ratio    PTT - ( 19 Oct 2022 22:56 )  PTT:32.0 sec      IMAGING:  < from: CT Head No Cont (10.19.22 @ 23:13) >    FINDINGS:    Stable ventricular shunt catheter via right frontal approach terminating   midline of the lateral ventricles.    Hyperdensity within the right posterior parietal region, frontal region   bilaterally and right sylvian fissure region suggesting posttraumatic   subarachnoid hemorrhage in the setting of trauma.    No evidence of acute cortical infarction.    Old ischemic changes within the frontal regions bilaterally. Ex vacuo   dilatation of the frontal horns of the lateral ventricles.    No mass effect or edema.    Small vessel and atrophic changes.    No sinusitis or mastoiditis.    No skull fracture. Mild subcutaneous soft tissue swelling left lateral   brow.    IMPRESSION:    Hyperdensity within the right posterior parietal region, frontal region   bilaterally and right sylvian fissureregion suggesting posttraumatic   subarachnoid hemorrhage in the setting of trauma. Follow-up recommended 6   to 12 hours to assess interval change.    Small vessel and atrophic changes.    < end of copied text >
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
## 2022-10-20 NOTE — ED PROCEDURE NOTE - PROCEDURE NAME, MLM
Subjective:      Karlene Walters is a 82 y.o. female who presents for pre-chemo for metastatic rectal cancer.         HPI    Patient seen today in follow up for metastatic colon cancer to liver. She presents unaccompanied for today's visit.  Patient is scheduled to receive cycle #123 ([facility] #115) single agent 5-FU which she continues on every 2-week interval.      Cancer History  Patient with a long history of rectal carcinoma initially diagnosed in 2012.  She was found to have metastatic disease to liver and lung in 2015.  She is status post liver ablation as well as has received Y 90 in the past.  She was on XELOX initially but had allergic reaction to oxaliplatin (last dose 7/28/15) and was switched to single agent 5-FU. Patient initially on 5-FU at 2400 mg/m2 with Leucovorin and 5-FU push starting on 10/27/15, followed by 20% dose reduction to 1920 mg/m2 for cycle 18 (7/5/16), followed by deletion of Leucovorin and 5-FU push on 10/31/17. In January 2020 patient was changed to every 3-week cycle at cycle #99 ([facility] #91) and then changed to every 4-week interval in May 2020.  It was at that time there was noted to have growth of disease within the liver and was seen by Dr. West, surgeon.  Patient did undergo surgery in hopes of resection of the tumor but it was felt to be too close to the hilum and therefore she did undergo ablation instead on 5/28/20.  She has been back on single agent 5-FU at every 2-week interval, restarted on 6/16/20. Last CT 10/06/20 showed the tumor ablation cavities within the right lobe of the liver have decreased in size since previous examination.  There is a 1.5 x 3.1 cm focal area of enhancement adjacent to the ablation cavity that appears to have increased concerning for possible tumor recurrence.  Based on that finding she was sent for an MRI for further evaluation on 10/8/2020 which confirmed that the appearance of the masses noted in the liver did not have an  appearance for suspicious finding for recurrent metastatic disease.  MRI of the abdomen completed on 1/11/2021 showed changes consistent with a partial right hepatectomy.  The ablation cavity showed no enhancement, alteration of diffusion signal or change, and therefore the reading radiologist stated this is noted to be a complete response to therapy.  MRI does also note a very stable 2.2 x 2.1 cm enhancing right cardiophrenic angle mass.  Questionable whether this is either adenopathy versus less likely a pulmonary parenchymal mass.  Patient does follow-up with pulmonary scheduled in May with a repeat CT chest at that time.  Per surgeon and Dr. Steele we will continue to monitor patient with a follow-up MRI in 3 months.  We will continue on current treatment as planned.      Interval History  Clinically patient is essentially stable.  She does have increased fatigue and at times does not want to continue with chemotherapy, however she will not stop as her disease has been controlled.  She does note increase in nausea more frequently, and is requiring Zofran 1-2 times per day.  She does continue to have pain post treatment in which she requires approximately 2-3 oxycodone tablets.  She also requires an oxycodone tablet very intermittently.  She takes 5 mg tablets.  She states that her appetite is stable, and her weight is stable.  She has noticed some increase in watery stool via her ostomy, approximately 2 times in the last 2 weeks.  She takes Imodium as needed.  She states after 2 tablets Imodium the loose stool resolves.  She does note that she has increased her water intake.    Patient was given a specific inhaler from pulmonary, Stiolto.  She had been experiencing side effects, and noticed if she had missed a dose she would not notice any changes in her respiratory status.  She has discontinued this medication altogether and stated she actually feels much better being off this medication.  She does use albuterol  "as needed, approximately 1-2 times per week for shortness of breath.    Patient questioning whether okay to have Tdap and shingles vaccine.  She did recently see her PCP as well who is also recommended bone mineral density and has requested patient discussed with oncology team.      Allergies   Allergen Reactions   • Oxaliplatin Anaphylaxis   • Codeine      \"gets drunk\"   • Pcn [Penicillins] Itching     itching   • Sulfa Drugs Itching     itching   • Tape Rash     PAPER TAPE OK     Current Outpatient Medications on File Prior to Visit   Medication Sig Dispense Refill   • furosemide (LASIX) 40 MG Tab      • potassium chloride ER (KLOR-CON) 10 MEQ tablet Take 1 Tab by mouth every day. 100 Tab 3   • ondansetron (ZOFRAN) 4 MG Tab tablet Take 1 Tab by mouth every four hours as needed. 30 Tab 5   • lidocaine-prilocaine (EMLA) 2.5-2.5 % Cream Apply to port 1 hour prior to access of port and cover with plastic wrap. 30 g 3   • albuterol 108 (90 Base) MCG/ACT Aero Soln inhalation aerosol Inhale 2 Puffs by mouth every 6 hours as needed for Shortness of Breath. 8.5 g 11   • metronidazole (METROCREAM) 0.75 % cream Apply 1 Each to affected area(s) 2 times a day.     • cyanocobalamin (VITAMIN B-12) 500 MCG Tab Take 1,000 mcg by mouth every day.     • Multiple Vitamins-Minerals (CENTRUM SILVER PO) Take 1 Tab by mouth every day.     • Cholecalciferol (VITAMIN D3) 400 UNITS CAPS Take 1 Cap by mouth every day.     • loratadine (CLARITIN) 10 MG TABS Take 10 mg by mouth every day. Indications: Hayfever     • Tiotropium Bromide-Olodaterol (STIOLTO RESPIMAT) 2.5-2.5 MCG/ACT Aero Soln Take 2 Puffs by mouth every day. (Patient not taking: Reported on 2/9/2021) 2 Each 0   • Tiotropium Bromide-Olodaterol (STIOLTO RESPIMAT) 2.5-2.5 MCG/ACT Aero Soln Take 2 Puffs by mouth every day. (Patient not taking: Reported on 2/9/2021) 1 Inhaler 11     Current Facility-Administered Medications on File Prior to Visit   Medication Dose Route Frequency " "Provider Last Rate Last Admin   • dexamethasone (DECADRON) injection 8 mg  8 mg Intravenous Once Lucho Steele M.D.       • fluorouracil (ADRUCIL) 3,475 mg in CADD Cassette/Bag Chemo infusion (for use in CADD PUMP)  1,920 mg/m2 Intravenous Once Lucho Steele M.D.           Review of Systems   Constitutional: Positive for malaise/fatigue. Negative for chills, fever and weight loss.   Respiratory: Positive for shortness of breath (chronic).    Cardiovascular: Negative for chest pain and palpitations.   Gastrointestinal: Positive for abdominal pain (noted intermittently at times, resolves with Oxycodone), diarrhea (at times) and nausea. Negative for constipation and vomiting.   Genitourinary: Negative for dysuria.          Objective:     /66   Pulse (!) 114   Temp 36.7 °C (98 °F) (Temporal)   Resp 19   Ht 1.702 m (5' 7\")   Wt 72.4 kg (159 lb 11.6 oz)   LMP  (LMP Unknown)   SpO2 96%   BMI 25.02 kg/m²      Physical Exam  Vitals signs reviewed.   Constitutional:       General: She is not in acute distress.     Appearance: Normal appearance. She is not diaphoretic.   HENT:      Head: Normocephalic and atraumatic.   Cardiovascular:      Rate and Rhythm: Regular rhythm. Tachycardia present.      Pulses: Normal pulses.      Heart sounds: Normal heart sounds. No murmur. No friction rub. No gallop.    Pulmonary:      Effort: Pulmonary effort is normal. No respiratory distress.      Breath sounds: Normal breath sounds. No wheezing.   Abdominal:      General: Bowel sounds are normal. There is no distension.      Palpations: Abdomen is soft. There is no mass.      Tenderness: There is no abdominal tenderness.   Musculoskeletal: Normal range of motion.         General: No swelling or tenderness.      Right lower leg: No edema.      Left lower leg: No edema.   Skin:     General: Skin is warm and dry.   Neurological:      Mental Status: She is alert and oriented to person, place, and time.   Psychiatric:         Mood " and Affect: Mood normal.         Behavior: Behavior normal.       Results for KANG HALL (MRN [id number])    Ref. Range 2/9/2021 07:40   WBC Latest Ref Range: 4.8 - 10.8 K/uL 9.2   RBC Latest Ref Range: 4.20 - 5.40 M/uL 4.54   Hemoglobin Latest Ref Range: 12.0 - 16.0 g/dL 12.5   Hematocrit Latest Ref Range: 37.0 - 47.0 % 40.8   MCV Latest Ref Range: 81.4 - 97.8 fL 89.9   MCH Latest Ref Range: 27.0 - 33.0 pg 27.5   MCHC Latest Ref Range: 33.6 - 35.0 g/dL 30.6 (L)   RDW Latest Ref Range: 35.9 - 50.0 fL 60.4 (H)   Platelet Count Latest Ref Range: 164 - 446 K/uL 342   MPV Latest Ref Range: 9.0 - 12.9 fL 10.1   Neutrophils-Polys Latest Ref Range: 44.00 - 72.00 % 66.10   Neutrophils (Absolute) Latest Ref Range: 2.00 - 7.15 K/uL 6.09   Lymphocytes Latest Ref Range: 22.00 - 41.00 % 19.10 (L)   Lymphs (Absolute) Latest Ref Range: 1.00 - 4.80 K/uL 1.76   Monocytes Latest Ref Range: 0.00 - 13.40 % 9.90   Monos (Absolute) Latest Ref Range: 0.00 - 0.85 K/uL 0.91 (H)   Eosinophils Latest Ref Range: 0.00 - 6.90 % 3.40   Eos (Absolute) Latest Ref Range: 0.00 - 0.51 K/uL 0.31   Basophils Latest Ref Range: 0.00 - 1.80 % 0.80   Baso (Absolute) Latest Ref Range: 0.00 - 0.12 K/uL 0.07   Immature Granulocytes Latest Ref Range: 0.00 - 0.90 % 0.70   Immature Granulocytes (abs) Latest Ref Range: 0.00 - 0.11 K/uL 0.06   Nucleated RBC Latest Units: /100 WBC 0.00   NRBC (Absolute) Latest Units: K/uL 0.00   Sodium Latest Ref Range: 135 - 145 mmol/L 135   Potassium Latest Ref Range: 3.6 - 5.5 mmol/L 4.1   Chloride Latest Ref Range: 96 - 112 mmol/L 103   Co2 Latest Ref Range: 20 - 33 mmol/L 18 (L)   Anion Gap Latest Ref Range: 7.0 - 16.0  14.0   Glucose Latest Ref Range: 65 - 99 mg/dL 147 (H)   Bun Latest Ref Range: 8 - 22 mg/dL 20   Creatinine Latest Ref Range: 0.50 - 1.40 mg/dL 1.18   GFR If  Latest Ref Range: >60 mL/min/1.73 m 2 53 (A)   GFR If Non  Latest Ref Range: >60 mL/min/1.73 m 2 44 (A)   Calcium  Latest Ref Range: 8.5 - 10.5 mg/dL 9.3   AST(SGOT) Latest Ref Range: 12 - 45 U/L 35   ALT(SGPT) Latest Ref Range: 2 - 50 U/L 27   Alkaline Phosphatase Latest Ref Range: 30 - 99 U/L 316 (H)   Total Bilirubin Latest Ref Range: 0.1 - 1.5 mg/dL 0.6   Albumin Latest Ref Range: 3.2 - 4.9 g/dL 3.5   Total Protein Latest Ref Range: 6.0 - 8.2 g/dL 7.2   Globulin Latest Ref Range: 1.9 - 3.5 g/dL 3.7 (H)   A-G Ratio Latest Units: g/dL 0.9   Carcinoembryonic Antigen Latest Ref Range: 0.0 - 3.0 ng/mL 1.5          Assessment/Plan:        1. Malignant neoplasm of rectum (HCC)  oxyCODONE immediate-release (ROXICODONE) 5 MG Tab    DS-BONE DENSITY STUDY (DEXA)   2. Secondary malignant neoplasm of liver (HCC)  oxyCODONE immediate-release (ROXICODONE) 5 MG Tab    rivaroxaban (XARELTO) 10 MG Tab tablet    DS-BONE DENSITY STUDY (DEXA)   3. Rectal cancer (HCC)  rivaroxaban (XARELTO) 10 MG Tab tablet   4. History of pulmonary embolus (PE)  rivaroxaban (XARELTO) 10 MG Tab tablet   5. Cancer related pain  oxyCODONE immediate-release (ROXICODONE) 5 MG Tab   6. Asymptomatic menopausal state   DS-BONE DENSITY STUDY (DEXA)       1.  Patient with metastatic colorectal cancer to liver currently on single agent 5-FU.  She is scheduled to proceed with treatment today.  I did review her CBC, CMP and CEA and labs are appropriate to proceed as planned.  She has had an elevated alkaline phosphatase previously that we have been monitoring and her alk phos this week is down to 316, previous 412. Patient stated she did not drink ETOH this last 2 weeks. I have asked that she continue to hold off on ETOH and will re-evaluate next cycle in 2 weeks. CEA is down to 1.5 today.     Plan for a 3-month follow-up MRI of the liver which is scheduled for 4/5/2021.      Patient is feeling quite fatigued and tired of treatment 2 weeks and the driving in from [employer] every 2 weeks.  We did discussed briefly the possibility of giving her 1 week off.  She is  apprehensive as change in her timing of chemotherapy previously did note progression of disease.  Discussed with patient my recommendation would be just delayed x1 week to give her a slight break in between treatments.  Patient at this time is not interested but will think about it.     2.  Patient with a history of pulmonary embolism currently on a low-dose Xarelto at 10 mg daily and will continue she is tolerating it well. Refill provided today.      3.  Patient with chemotherapy-induced nausea which is slightly worsening.  However she is using Zofran with positive results.  She is to continue on Zofran and can consider an additional antiemetic if needed in the future.     4.  Patient with cancer related pain.  She does take oxycodone 5 mg tablets as needed.  I did review the patient's PDMP and confirmed that her last prescription was filled by surgeon on 6/2/2020, where she was given quantity #30.  Previous to that prescription was given by myself in May 2019.  No evidence of addiction, and patient is requesting a refill at this time.  I did go ahead and provide patient with a refill of oxycodone 5 mg tablets p.o. every 6 hours as needed, quantity #45 with no refills.  Consent form completed today.    Opioid Risk Score: 0    Interpretation of Opioid Risk Score   Score 0-3 = Low risk of abuse. Do UDS at least once per year.  Score 4-7 = Moderate risk of abuse. Do UDS 1-4 times per year.  Score 8+ = High risk of abuse. Refer to specialist.    In prescribing controlled substances to this patient, I certify that I have obtained and reviewed the medical history of Karlene Walters. I have also made a good dionte effort to obtain applicable records from other providers who have treated the patient and records did not demonstrate any increased risk of substance abuse that would prevent me from prescribing controlled substances.     I have conducted a physical exam and documented it. I have reviewed Ms. Walters’s  prescription history as maintained by the [place] Prescription Monitoring Program.     I have assessed the patient’s risk for abuse, dependency, and addiction using the validated Opioid Risk Tool available at https://www.mdcalc.com/zemvxx-droi-bkso-ort-narcotic-abuse.     Given the above, I believe the benefits of controlled substance therapy outweigh the risks. The reasons for prescribing controlled substances include non-narcotic, oral analgesic alternatives have been inadequate for pain control. Accordingly, I have discussed the risk and benefits, treatment plan, and alternative therapies with the patient.     5.  Per patient, PCP is requesting patient have a bone mineral density test.  Discussed with patient and will go ahead and order this per PCPs request.    6.  PCP is also requesting patient complete Tdap and Shingles vaccine.      - We DO NOT recommend patient receive Tdap vaccine.    - It is okay for her to receive the Shingles vaccine provided it is the NEW vaccine ONLY. She is to follow up with her PCP for this.     7.  Patient will continue on treatment as planned with single agent 5-FU every 2 weeks.  She is due to follow-up again in 2 weeks or sooner if needed.         Laceration Repair

## 2022-10-20 NOTE — ED PROVIDER NOTE - PROGRESS NOTE DETAILS
Given the significant and immediate threats to this patient based on initial presentation, the benefits of emergency contrast-enhanced CT imaging without obtaining GFR/creatinine serum level results greatly outweigh the potential risk of harm due to contrast-induced nephropathy. Norris: neurosurg/trauma consulted

## 2022-10-20 NOTE — CHART NOTE - NSCHARTNOTEFT_GEN_A_CORE
REMOVAL of CERVICAL COLLAR:     Patient reports mild headache.    GEN:  Comfortable, conversant, oriented to person, place, time and situation.  NECK:  No midline tenderness, no pain w/lateral rotation of neck or flexion/extension  NEURO:  Moves upper extremities without difficulty.  Weak R hip flexor otherwise unable to move b/l lower extremities but sensation is intact (baseline per patient)  FACE:  L eyebrow laceration w/associated swelling - sutured  ABD:  Soft, non tender, non distended  EXT:  Edematous to knees b/l, warm, superficial scrapes/scabs to left lateral knee, mild tenderness of left trapezius w/o abrasion noted    CT neck w/o injury.  No evidence of BCVI on CTA.  No evidence of aneurysmal disease on CTA head.  Cervical collar removed.
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
## 2022-10-21 LAB
ANION GAP SERPL CALC-SCNC: 11 MMOL/L — SIGNIFICANT CHANGE UP (ref 5–17)
BASOPHILS # BLD AUTO: 0.06 K/UL — SIGNIFICANT CHANGE UP (ref 0–0.2)
BASOPHILS NFR BLD AUTO: 0.8 % — SIGNIFICANT CHANGE UP (ref 0–2)
BUN SERPL-MCNC: 9.9 MG/DL — SIGNIFICANT CHANGE UP (ref 8–20)
CALCIUM SERPL-MCNC: 8.8 MG/DL — SIGNIFICANT CHANGE UP (ref 8.4–10.5)
CHLORIDE SERPL-SCNC: 108 MMOL/L — HIGH (ref 98–107)
CO2 SERPL-SCNC: 25 MMOL/L — SIGNIFICANT CHANGE UP (ref 22–29)
CREAT SERPL-MCNC: 0.54 MG/DL — SIGNIFICANT CHANGE UP (ref 0.5–1.3)
EGFR: 118 ML/MIN/1.73M2 — SIGNIFICANT CHANGE UP
EOSINOPHIL # BLD AUTO: 0.15 K/UL — SIGNIFICANT CHANGE UP (ref 0–0.5)
EOSINOPHIL NFR BLD AUTO: 2.1 % — SIGNIFICANT CHANGE UP (ref 0–6)
GLUCOSE SERPL-MCNC: 102 MG/DL — HIGH (ref 70–99)
HCT VFR BLD CALC: 42.7 % — SIGNIFICANT CHANGE UP (ref 39–50)
HGB BLD-MCNC: 14.4 G/DL — SIGNIFICANT CHANGE UP (ref 13–17)
IMM GRANULOCYTES NFR BLD AUTO: 0.4 % — SIGNIFICANT CHANGE UP (ref 0–0.9)
LYMPHOCYTES # BLD AUTO: 0.96 K/UL — LOW (ref 1–3.3)
LYMPHOCYTES # BLD AUTO: 13.1 % — SIGNIFICANT CHANGE UP (ref 13–44)
MAGNESIUM SERPL-MCNC: 1.7 MG/DL — SIGNIFICANT CHANGE UP (ref 1.6–2.6)
MCHC RBC-ENTMCNC: 30.1 PG — SIGNIFICANT CHANGE UP (ref 27–34)
MCHC RBC-ENTMCNC: 33.7 GM/DL — SIGNIFICANT CHANGE UP (ref 32–36)
MCV RBC AUTO: 89.1 FL — SIGNIFICANT CHANGE UP (ref 80–100)
MONOCYTES # BLD AUTO: 0.88 K/UL — SIGNIFICANT CHANGE UP (ref 0–0.9)
MONOCYTES NFR BLD AUTO: 12 % — SIGNIFICANT CHANGE UP (ref 2–14)
NEUTROPHILS # BLD AUTO: 5.23 K/UL — SIGNIFICANT CHANGE UP (ref 1.8–7.4)
NEUTROPHILS NFR BLD AUTO: 71.6 % — SIGNIFICANT CHANGE UP (ref 43–77)
PHOSPHATE SERPL-MCNC: 3.3 MG/DL — SIGNIFICANT CHANGE UP (ref 2.4–4.7)
PLATELET # BLD AUTO: 168 K/UL — SIGNIFICANT CHANGE UP (ref 150–400)
POTASSIUM SERPL-MCNC: 4.7 MMOL/L — SIGNIFICANT CHANGE UP (ref 3.5–5.3)
POTASSIUM SERPL-SCNC: 4.7 MMOL/L — SIGNIFICANT CHANGE UP (ref 3.5–5.3)
RBC # BLD: 4.79 M/UL — SIGNIFICANT CHANGE UP (ref 4.2–5.8)
RBC # FLD: 14.2 % — SIGNIFICANT CHANGE UP (ref 10.3–14.5)
SODIUM SERPL-SCNC: 144 MMOL/L — SIGNIFICANT CHANGE UP (ref 135–145)
WBC # BLD: 7.31 K/UL — SIGNIFICANT CHANGE UP (ref 3.8–10.5)
WBC # FLD AUTO: 7.31 K/UL — SIGNIFICANT CHANGE UP (ref 3.8–10.5)

## 2022-10-21 PROCEDURE — 73620 X-RAY EXAM OF FOOT: CPT | Mod: 26,LT

## 2022-10-21 PROCEDURE — 73560 X-RAY EXAM OF KNEE 1 OR 2: CPT | Mod: 26,LT

## 2022-10-21 PROCEDURE — 99233 SBSQ HOSP IP/OBS HIGH 50: CPT

## 2022-10-21 RX ORDER — SODIUM,POTASSIUM PHOSPHATES 278-250MG
1 POWDER IN PACKET (EA) ORAL
Refills: 0 | Status: COMPLETED | OUTPATIENT
Start: 2022-10-21 | End: 2022-10-21

## 2022-10-21 RX ORDER — POTASSIUM CHLORIDE 20 MEQ
40 PACKET (EA) ORAL ONCE
Refills: 0 | Status: COMPLETED | OUTPATIENT
Start: 2022-10-21 | End: 2022-10-21

## 2022-10-21 RX ORDER — ENOXAPARIN SODIUM 100 MG/ML
40 INJECTION SUBCUTANEOUS EVERY 24 HOURS
Refills: 0 | Status: DISCONTINUED | OUTPATIENT
Start: 2022-10-21 | End: 2022-10-25

## 2022-10-21 RX ORDER — HYDROMORPHONE HYDROCHLORIDE 2 MG/ML
0.5 INJECTION INTRAMUSCULAR; INTRAVENOUS; SUBCUTANEOUS ONCE
Refills: 0 | Status: DISCONTINUED | OUTPATIENT
Start: 2022-10-21 | End: 2022-10-21

## 2022-10-21 RX ADMIN — Medication 1 MILLIGRAM(S): at 12:41

## 2022-10-21 RX ADMIN — Medication 1 PACKET(S): at 16:59

## 2022-10-21 RX ADMIN — Medication 40 MILLIEQUIVALENT(S): at 07:22

## 2022-10-21 RX ADMIN — Medication 1 PACKET(S): at 07:27

## 2022-10-21 RX ADMIN — Medication 100 MILLIGRAM(S): at 12:41

## 2022-10-21 RX ADMIN — Medication 3 MILLIGRAM(S): at 21:39

## 2022-10-21 RX ADMIN — Medication 1 TABLET(S): at 12:41

## 2022-10-21 RX ADMIN — ENOXAPARIN SODIUM 40 MILLIGRAM(S): 100 INJECTION SUBCUTANEOUS at 21:39

## 2022-10-21 RX ADMIN — HYDROMORPHONE HYDROCHLORIDE 0.5 MILLIGRAM(S): 2 INJECTION INTRAMUSCULAR; INTRAVENOUS; SUBCUTANEOUS at 09:24

## 2022-10-21 RX ADMIN — QUETIAPINE FUMARATE 150 MILLIGRAM(S): 200 TABLET, FILM COATED ORAL at 21:39

## 2022-10-21 NOTE — PROGRESS NOTE ADULT - TIME BILLING
Feels well today.  Reports L knee and L ankle discomfort.    GEN:  Awake, alert, conversant, oriented to person, place and date as well as situation  FACE:  L eyebrow w/decreased swelling and sutured laceration  ABD:  Soft, non tender, non distended, palpable R sided abdominal anterior wall mass (baclofen pump)  EXT:  Moves upper extremities without incident, inability to move b/l lower extremities w/preserved sensation, L knee swelling noted today w/lateral superficial appearing abrasions    Fall  B/L frontal/parietal SAH  L forehead laceration  L knee swelling    -HD stable  -Adequate sats on RA  -Repeat head CT stable, CT C,T,L spine w/o acute evidence of trauma/fracture  -Shunt series suggests mild kinking of  shunt - will ask NSGY to comment on this in setting of noted hydrocephalus on index film  -CIWA  -PT/OT    No acute critical care issues.  Will transfer from critical care.

## 2022-10-21 NOTE — ED ADULT NURSE REASSESSMENT NOTE - NS ED NURSE REASSESS COMMENT FT1
assumed care of pt at 07:15. report received from be garcia. charting as noted. continued cardiac monitoring in place. anox4. see nih and ciwa in flow sheet. pt resting comfortably in stretcher eating breakfast. awaiting sicu bed. rr even and unlabored. no apparent distress noted.  iv intact pt educated on plan of care, pt able to successfully teach back plan of care to RN, RN will continue to reeducate pt during hospital stay. assumed care of pt at 07:15. report received from be garcia. charting as noted. continued cardiac monitoring in place. anox4. see nih and ciwa in flow sheet. pt resting comfortably in stretcher eating breakfast. awaiting sicu bed. HOB 30 degrees rr even and unlabored. no apparent distress noted.  iv intact pt educated on plan of care, pt able to successfully teach back plan of care to RN, RN will continue to reeducate pt during hospital stay.

## 2022-10-21 NOTE — PATIENT PROFILE ADULT - OVER THE PAST TWO WEEKS, HAVE YOU FELT LITTLE INTEREST OR PLEASURE IN DOING THINGS?
Online Visit    Date/Time: 12/14/2017 5:22:16 PM  To: JUSTUS CARLOS  From: DAVID HALE  Subject:    Your pap smear was normal, repeat your gynecological exam in 1 year.       no

## 2022-10-21 NOTE — CHART NOTE - NSCHARTNOTEFT_GEN_A_CORE
SICU TRANSFER NOTE  -----------------------------  ICU Admission Date: 10/20  Transfer Date: 10-21-22 @ 14:14    Admission Diagnosis: fall from wheelchair    Active Problems/injuries: BL frontal/parietal SAH, sylvian fissure SAH, L forehead laceration    Procedures: none    Consultants: NSRG    Medications  acetaminophen     Tablet .. 650 milliGRAM(s) Oral every 6 hours PRN  enoxaparin Injectable 40 milliGRAM(s) SubCutaneous every 24 hours  folic acid 1 milliGRAM(s) Oral daily  melatonin 3 milliGRAM(s) Oral at bedtime  multivitamin 1 Tablet(s) Oral daily  potassium phosphate / sodium phosphate Powder (PHOS-NaK) 1 Packet(s) Oral two times a day  QUEtiapine 150 milliGRAM(s) Oral at bedtime  senna 2 Tablet(s) Oral at bedtime  thiamine 100 milliGRAM(s) Oral daily    [x] I attest I have reviewed and reconciled all medications prior to transfer    Antibiotics: none    I have discussed this case with [facility] trauma service upon transfer and all questions regarding ICU course were answered.  The following items are to be followed up:    - CT head stable  - continue CIWA, no meds, has not been scoring  - f/u L foot x-ray  - f/u L knee x-ray  - Shunt series read by radiology with concern for narrowing in lower portion of neck. NSRG reviewed images. Nothing to do.  - Lovenox to start tonight
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
## 2022-10-21 NOTE — CHART NOTE - NSCHARTNOTEFT_GEN_A_CORE
VPS shunt series reviewed w Dr Sandoval and no disconnections appreciated nor change in the CT Head ventricles.   cont current care and f/u OP w primary Nsx     < from: Xray Shunt Series (10.20.22 @ 16:33) >    CC: [id number] EXAM:  XR SHUNT SERIES#                          PROCEDURE DATE:  10/20/2022          INTERPRETATION:  XR SHUNT SERIES  Clinical information: hydrocephalus  An x-ray shunt series was performed.    TECHNIQUE:  AP and lateral views of the skull, soft tissues of the neck, chest, and abdomen.    FINDINGS:  Right frontal  shunt tip projecting to midline. There is a proximal radiolucent connector. Peritubal calcifications within the neck with moderate kinking within the lower neck. Tubing courses within the anterior soft tissues of the right side of the chest and within the right upper abdomen. It enters the abdominal cavity within the right mid abdomen with its tip overlying the right iliac wing.    An IVC filter is seen. An epidural pump overlies the right lower abdomen with tubing projecting towards the lower lumbar spine.  There are low lung volumes. Mild linear atelectasis left lung base. No bowel obstruction. Mild to moderate retained stool within the colon.   There is calcification about the right hip.    IMPRESSION:  Right-sided  shunt. Peritubal calcification within the neck with moderate kinking of the shunt tubing within the lower neck  --- End of Report ---  ALEX LEWIS MD; Attending Radiologist  This document has been electronically signed. Oct 21 2022 12:21PM          < end of copied text >  < from: CT Head No Cont (10.20.22 @ 05:42) >    ACC: [id number] EXAM:  CT BRAIN                        PROCEDURE DATE:  10/20/2022    INTERPRETATION:  CLINICAL INFORMATION: Follow-up subarachnoid hemorrhage.  COMPARISON STUDY: CT head from 10/19/2022 at 11:02 PM.    TECHNIQUE:  Axial CT images were acquired through the head.  Intravenous contrast: None  Two-dimensional reformats were generated.      FINDINGS:  There is stable mild subarachnoid hemorrhage within the anterior frontal regions bilaterally and right sylvian fissure.    A right frontal approach  shunt catheter courses through the right lateral ventricle and has its tip in the region of the left lateral ventricle.  Moderate dilatation of the third ventricle and both lateral   ventricles is unchanged from 11/05/2020.  Chronic encephalomalacia/gliosis in the medial frontal lobes bilaterally, small chronic transcortical infarcts in the posterior medial frontal lobes bilaterally, and severe confluent periventricular white matter   hypoattenuation in both cerebral hemispheres are stable from 11/05/2020.    There is no mass effect, midline shift, central herniation or evidence of acute territorial infarct.    The paranasal sinuses and mastoids are grossly clear.    The calvarium, skull base and orbits appear within normal limits.    There is left periorbital soft tissue hematoma with punctate hyperattenuating foci in the superficial annular the skin surface that may reflect foreign bodies.      IMPRESSION:  Stable mild subarachnoid hemorrhage within the anterior frontal regions bilaterally and right sylvian fissure.    Left periorbital soft tissue hematoma with punctate hyperattenuating foci superficially near the skin surface that may reflect foreign bodies    --- End of Report ---  LEVI HESTER MD; Attending Radiologist  This document has been electronically signed. Oct 20 2022  5:51AM  < end of copied text >

## 2022-10-21 NOTE — PATIENT PROFILE ADULT - FALL HARM RISK - HARM RISK INTERVENTIONS

## 2022-10-21 NOTE — ED ADULT NURSE REASSESSMENT NOTE - NS ED NURSE REASSESS COMMENT FT1
Christian mo made aware of new hr. pt c/o of neck pain. awaiting med orders Christian will made aware of new hr. pt c/o of neck pain. awaiting med orders

## 2022-10-22 LAB
ANION GAP SERPL CALC-SCNC: 10 MMOL/L — SIGNIFICANT CHANGE UP (ref 5–17)
BUN SERPL-MCNC: 15 MG/DL — SIGNIFICANT CHANGE UP (ref 8–20)
CALCIUM SERPL-MCNC: 8.8 MG/DL — SIGNIFICANT CHANGE UP (ref 8.4–10.5)
CHLORIDE SERPL-SCNC: 107 MMOL/L — SIGNIFICANT CHANGE UP (ref 98–107)
CO2 SERPL-SCNC: 24 MMOL/L — SIGNIFICANT CHANGE UP (ref 22–29)
CREAT SERPL-MCNC: 0.52 MG/DL — SIGNIFICANT CHANGE UP (ref 0.5–1.3)
EGFR: 119 ML/MIN/1.73M2 — SIGNIFICANT CHANGE UP
GLUCOSE SERPL-MCNC: 87 MG/DL — SIGNIFICANT CHANGE UP (ref 70–99)
HCT VFR BLD CALC: 41.3 % — SIGNIFICANT CHANGE UP (ref 39–50)
HGB BLD-MCNC: 13.6 G/DL — SIGNIFICANT CHANGE UP (ref 13–17)
MAGNESIUM SERPL-MCNC: 1.7 MG/DL — SIGNIFICANT CHANGE UP (ref 1.6–2.6)
MCHC RBC-ENTMCNC: 29.8 PG — SIGNIFICANT CHANGE UP (ref 27–34)
MCHC RBC-ENTMCNC: 32.9 GM/DL — SIGNIFICANT CHANGE UP (ref 32–36)
MCV RBC AUTO: 90.6 FL — SIGNIFICANT CHANGE UP (ref 80–100)
MRSA PCR RESULT.: SIGNIFICANT CHANGE UP
PHOSPHATE SERPL-MCNC: 3.1 MG/DL — SIGNIFICANT CHANGE UP (ref 2.4–4.7)
PLATELET # BLD AUTO: 162 K/UL — SIGNIFICANT CHANGE UP (ref 150–400)
POTASSIUM SERPL-MCNC: 3.9 MMOL/L — SIGNIFICANT CHANGE UP (ref 3.5–5.3)
POTASSIUM SERPL-SCNC: 3.9 MMOL/L — SIGNIFICANT CHANGE UP (ref 3.5–5.3)
RBC # BLD: 4.56 M/UL — SIGNIFICANT CHANGE UP (ref 4.2–5.8)
RBC # FLD: 14.2 % — SIGNIFICANT CHANGE UP (ref 10.3–14.5)
S AUREUS DNA NOSE QL NAA+PROBE: SIGNIFICANT CHANGE UP
SODIUM SERPL-SCNC: 141 MMOL/L — SIGNIFICANT CHANGE UP (ref 135–145)
WBC # BLD: 6.15 K/UL — SIGNIFICANT CHANGE UP (ref 3.8–10.5)
WBC # FLD AUTO: 6.15 K/UL — SIGNIFICANT CHANGE UP (ref 3.8–10.5)

## 2022-10-22 PROCEDURE — 99231 SBSQ HOSP IP/OBS SF/LOW 25: CPT | Mod: GC

## 2022-10-22 PROCEDURE — 70450 CT HEAD/BRAIN W/O DYE: CPT | Mod: 26

## 2022-10-22 RX ORDER — OXYCODONE HYDROCHLORIDE 5 MG/1
5 TABLET ORAL ONCE
Refills: 0 | Status: DISCONTINUED | OUTPATIENT
Start: 2022-10-22 | End: 2022-10-22

## 2022-10-22 RX ORDER — ACETAMINOPHEN 500 MG
975 TABLET ORAL EVERY 6 HOURS
Refills: 0 | Status: DISCONTINUED | OUTPATIENT
Start: 2022-10-22 | End: 2022-10-25

## 2022-10-22 RX ADMIN — OXYCODONE HYDROCHLORIDE 5 MILLIGRAM(S): 5 TABLET ORAL at 17:06

## 2022-10-22 RX ADMIN — Medication 100 MILLIGRAM(S): at 13:40

## 2022-10-22 RX ADMIN — Medication 650 MILLIGRAM(S): at 07:05

## 2022-10-22 RX ADMIN — Medication 1 MILLIGRAM(S): at 13:40

## 2022-10-22 RX ADMIN — QUETIAPINE FUMARATE 150 MILLIGRAM(S): 200 TABLET, FILM COATED ORAL at 21:35

## 2022-10-22 RX ADMIN — SENNA PLUS 2 TABLET(S): 8.6 TABLET ORAL at 21:35

## 2022-10-22 RX ADMIN — Medication 3 MILLIGRAM(S): at 21:35

## 2022-10-22 RX ADMIN — Medication 650 MILLIGRAM(S): at 06:19

## 2022-10-22 RX ADMIN — Medication 650 MILLIGRAM(S): at 15:05

## 2022-10-22 RX ADMIN — OXYCODONE HYDROCHLORIDE 5 MILLIGRAM(S): 5 TABLET ORAL at 16:36

## 2022-10-22 RX ADMIN — Medication 650 MILLIGRAM(S): at 14:26

## 2022-10-22 RX ADMIN — ENOXAPARIN SODIUM 40 MILLIGRAM(S): 100 INJECTION SUBCUTANEOUS at 21:35

## 2022-10-22 RX ADMIN — Medication 1 TABLET(S): at 13:40

## 2022-10-22 NOTE — PROGRESS NOTE ADULT - ATTENDING COMMENTS
Agree with above assessment.  The patient was seen and examined by myself with the surgical resident.  The patient is without new events or complaints overnight.  The patient is with a soft non distended abdomen.  Trauma stable for discharge planning.

## 2022-10-23 PROCEDURE — 99231 SBSQ HOSP IP/OBS SF/LOW 25: CPT | Mod: GC

## 2022-10-23 RX ORDER — METHOCARBAMOL 500 MG/1
750 TABLET, FILM COATED ORAL ONCE
Refills: 0 | Status: COMPLETED | OUTPATIENT
Start: 2022-10-23 | End: 2022-10-23

## 2022-10-23 RX ADMIN — Medication 1 TABLET(S): at 13:20

## 2022-10-23 RX ADMIN — METHOCARBAMOL 750 MILLIGRAM(S): 500 TABLET, FILM COATED ORAL at 05:17

## 2022-10-23 RX ADMIN — Medication 1 MILLIGRAM(S): at 13:20

## 2022-10-23 RX ADMIN — SENNA PLUS 2 TABLET(S): 8.6 TABLET ORAL at 21:30

## 2022-10-23 RX ADMIN — Medication 975 MILLIGRAM(S): at 18:57

## 2022-10-23 RX ADMIN — Medication 3 MILLIGRAM(S): at 21:29

## 2022-10-23 RX ADMIN — Medication 975 MILLIGRAM(S): at 18:01

## 2022-10-23 RX ADMIN — Medication 100 MILLIGRAM(S): at 13:20

## 2022-10-23 RX ADMIN — ENOXAPARIN SODIUM 40 MILLIGRAM(S): 100 INJECTION SUBCUTANEOUS at 21:30

## 2022-10-23 RX ADMIN — QUETIAPINE FUMARATE 150 MILLIGRAM(S): 200 TABLET, FILM COATED ORAL at 21:30

## 2022-10-23 NOTE — PROGRESS NOTE ADULT - ATTENDING COMMENTS
Agree with above assessment.  The patient was seen and examined by myself with the surgical PA and resident. The patient is without new complaints or events.  No headache this morning. Repeat head CT last night with improvement.  Trauma stable for discharge planning.

## 2022-10-24 ENCOUNTER — TRANSCRIPTION ENCOUNTER (OUTPATIENT)
Age: 57
End: 2022-10-24

## 2022-10-24 PROCEDURE — 99223 1ST HOSP IP/OBS HIGH 75: CPT | Mod: GC

## 2022-10-24 RX ORDER — THIAMINE MONONITRATE (VIT B1) 100 MG
1 TABLET ORAL
Qty: 0 | Refills: 0 | DISCHARGE
Start: 2022-10-24

## 2022-10-24 RX ORDER — BACLOFEN 100 %
30 POWDER (GRAM) MISCELLANEOUS
Qty: 0 | Refills: 0 | DISCHARGE

## 2022-10-24 RX ORDER — IBUPROFEN 200 MG
0 TABLET ORAL
Qty: 0 | Refills: 0 | DISCHARGE

## 2022-10-24 RX ORDER — DANTROLENE SODIUM 20 MG/60ML
1 INJECTION INTRAVENOUS
Qty: 0 | Refills: 0 | DISCHARGE

## 2022-10-24 RX ORDER — SENNA PLUS 8.6 MG/1
2 TABLET ORAL
Qty: 0 | Refills: 0 | DISCHARGE
Start: 2022-10-24

## 2022-10-24 RX ORDER — FOLIC ACID 0.8 MG
1 TABLET ORAL
Qty: 0 | Refills: 0 | DISCHARGE
Start: 2022-10-24

## 2022-10-24 RX ORDER — ASPIRIN/CALCIUM CARB/MAGNESIUM 324 MG
1 TABLET ORAL
Qty: 0 | Refills: 0 | DISCHARGE

## 2022-10-24 RX ADMIN — SENNA PLUS 2 TABLET(S): 8.6 TABLET ORAL at 21:26

## 2022-10-24 RX ADMIN — Medication 975 MILLIGRAM(S): at 03:39

## 2022-10-24 RX ADMIN — Medication 1 MILLIGRAM(S): at 11:09

## 2022-10-24 RX ADMIN — QUETIAPINE FUMARATE 150 MILLIGRAM(S): 200 TABLET, FILM COATED ORAL at 21:26

## 2022-10-24 RX ADMIN — Medication 3 MILLIGRAM(S): at 21:26

## 2022-10-24 RX ADMIN — ENOXAPARIN SODIUM 40 MILLIGRAM(S): 100 INJECTION SUBCUTANEOUS at 21:26

## 2022-10-24 RX ADMIN — Medication 100 MILLIGRAM(S): at 11:09

## 2022-10-24 RX ADMIN — Medication 975 MILLIGRAM(S): at 05:03

## 2022-10-24 RX ADMIN — Medication 1 TABLET(S): at 11:09

## 2022-10-24 NOTE — DISCHARGE NOTE PROVIDER - NSDCCPCAREPLAN_GEN_ALL_CORE_FT
PRINCIPAL DISCHARGE DIAGNOSIS  Diagnosis: Hemorrhage, subarachnoid, traumatic  Assessment and Plan of Treatment: Patient remains neurologically intact. Patient should continue to follow up with his neurosurgeon regarding his  shunt and regarding his hopsital course.  If patient does NOT have a neurosurgeon he may follow up with Dr. Sandoval in 7-14 days day. Patient should call to make an appointment upon discharge.    BATHING:  You may shower and/or sponge bathe.   ACTIVITY: No heavy lifting or straining. Otherwise, you may return to your usual level of physical activity. If you are taking narcotic pain medication (such as Percocet) DO NOT drive a car, operate machinery or make important decisions.  DIET: Patient is advised to RETURN TO THE EMERGENCY DEPARTMENT for any of the following - new or worsening headache,  pain, fever/chills, nausea/vomiting, altered mental status, visual changes, chest pain, shortness of breath, or any other new / worsening symptom. to your usual diet.  NOTIFY YOUR SURGEON IF: You have any fever (over 100.4 F) or chills, persistent nausea/vomiting, persistent diarrhea, or if your pain is not controlled on your discharge pain medications.  FOLLOW-UP: Please follow up with your primary care physician and Acute Care Surgery clinic [phone] in 10-14 days regarding your hospitalization. Call for appointment upon discharge.         PRINCIPAL DISCHARGE DIAGNOSIS  Diagnosis: Hemorrhage, subarachnoid, traumatic  Assessment and Plan of Treatment: FOLLOW-UP: Patient should continue to follow up with his neurosurgeon regarding his  shunt and regarding his hopsital course.  If patient does NOT have a neurosurgeon he may follow up with Dr. Sandoval in 7-14 days day. Patient should call to make an appointment upon discharge. Follow-up with your primary care provider after discharge as well.  BATHING:  You may shower and/or sponge bathe.   ACTIVITY: No heavy lifting or straining. Otherwise, you may return to your usual level of physical activity  MEDICATIONS: Resume home medications as previously prescribed. Tylenol for pain relief, as needed.  DIET: Patient is advised to RETURN TO THE EMERGENCY DEPARTMENT for any of the following - new or worsening headache,  pain, fever/chills, nausea/vomiting, altered mental status, visual changes, chest pain, shortness of breath, or any other new / worsening symptom. to your usual diet.  NOTIFY YOUR SURGEON IF: You have any fever (over 100.4 F) or chills, persistent nausea/vomiting, persistent diarrhea, or if your pain is not controlled on your discharge pain medications.

## 2022-10-24 NOTE — OCCUPATIONAL THERAPY INITIAL EVALUATION ADULT - LIVES WITH, PROFILE
in a senior [facility] apartment with elevator to 2nd floor; pt has HHA 8hrs/day, 7days per week/alone

## 2022-10-24 NOTE — OCCUPATIONAL THERAPY INITIAL EVALUATION ADULT - ADDITIONAL COMMENTS
Pt has tub with curtain  Pt owns a commode, 2 motorized wheelchairs, 2 reachers, manual wheelchair, and tub bench  Pt is right handed

## 2022-10-24 NOTE — DISCHARGE NOTE PROVIDER - HOSPITAL COURSE
Patient is a 57y old m with PMH paraplegia presenting s/p fall from wheelchair with traumatic b/l frontal and R posterior parietal SAH. Tenderness in the C and L spine in which CT scan was negative.       CT of head shows: Hyperdensity within the right posterior parietal region, frontal region bilaterally and right sylvian fissure region suggesting posttraumatic subarachnoid hemorrhage in the setting of trauma. Stable ventricular shunt catheter via right frontal approach terminating midline of the lateral ventricles. Small vessel and atrophic changes.  Xray Knee: No fracture, small suprapatellar effusion, new.   CT cervical and thoracic spine: negative for injury.    Patient remains neurologically at baseline. According to neurosurgeon-  shunt without issues, patient should follow up with them as an outpatient.     Patient seen and evaluated by PT/OT and PM&R and cleared for discharge home today.     Patient voiding, having bowel function and tolerating diet.  Patient is cleared for discharge home per neurosurgery, trauma team and ancillary staff. Patient is a 57y old m with PMH paraplegia presenting s/p fall from wheelchair with traumatic b/l frontal and R posterior parietal SAH. Tenderness in the C and L spine in which CT scan was negative.     CT of head shows: Hyperdensity within the right posterior parietal region, frontal region bilaterally and right sylvian fissure region suggesting posttraumatic subarachnoid hemorrhage in the setting of trauma. Stable ventricular shunt catheter via right frontal approach terminating midline of the lateral ventricles. Small vessel and atrophic changes.  Xray Knee: No fracture, small suprapatellar effusion, new.   CT cervical and thoracic spine: negative for injury.    Patient remains neurologically at baseline. According to neurosurgeon-  shunt without issues, patient should follow up with them as an outpatient.     Patient seen and evaluated by PT/OT and PM&R and cleared for discharge home with home PT.     Patient voiding, having bowel function and tolerating diet.  Patient is cleared for discharge home per neurosurgery, trauma team and ancillary staff.     Patient is advised to RETURN TO THE EMERGENCY DEPARTMENT for any of the following - worsening pain, fever/chills, nausea/vomiting, altered mental status, chest pain, shortness of breath, or any other new / worsening symptom.

## 2022-10-24 NOTE — CHART NOTE - NSCHARTNOTEFT_GEN_A_CORE
Tertiary Trauma Survey (TTS)    Date of TTS: 10-24-22 @ 16:05                             Admit Date: 10-20-22 @ 00:40      Trauma Activation:      Subjective / 24 hour events:     Vital Signs Last 24 Hrs  T(C): 37.2 (24 Oct 2022 15:50), Max: 37.2 (24 Oct 2022 15:50)  T(F): 99 (24 Oct 2022 15:50), Max: 99 (24 Oct 2022 15:50)  HR: 92 (24 Oct 2022 15:50) (73 - 92)  BP: 123/78 (24 Oct 2022 15:50) (123/78 - 134/82)  BP(mean): --  RR: 18 (24 Oct 2022 15:50) (18 - 18)  SpO2: 93% (24 Oct 2022 15:50) (93% - 96%)    Parameters below as of 24 Oct 2022 15:50  Patient On (Oxygen Delivery Method): room air        Physical Exam:    Neuro: [ ] non focal neurological exam [X] Focal Neurological deficits noted to be: Bilateral upper extremity sensorimotor exam intact, b/l LEs with minimal motor function at baseline, sensation intact.     HEENT: [X] Normo-cephalic/atraumatic  [ ] abnormalities noted to be:    Pulm/Chest:  [X] CTA b/l  [X] chest wall non tender  [ ] abnormalities noted to be:    Cardiac: [X] S1S2, sinus rhythm  [ ] abnormalities noted to be:     GI / Abdomen: [X] Soft, non-tender, non-distended [ ] abnormalities noted to be:    Musculoskeletal / Extremities: [ ]normal active ROM  [X]  abnormalities noted to be: b/l upper extremities with normal active range of motion, b/l lower extremities with minimal active range of motion at baseline    Integumentary: [X] Skin intact [X ] Warm [ X] Dry [ ]abnormalities noted to be:    Vascular: [X ] 2+ palpable distal pulses  [ ] INOCENTE:       [ ] abnormalities noted to be:      List Injuries Identified to Date: b/l anterior SAH, R posterior parietal SAH, small L suprapatellar effusion     List Operative and Interventional Radiological Procedures:     Consults (Date):  [X  ] Neurosurgery   [  ] Orthopedics  [  ] Plastics  [  ] Urology  [  ] PM&R  [  ] Social Work    RADIOLOGICAL FINDINGS REVIEW:  XR Shunt: Right-sided  shunt. Peritubal calcification within the neck with moderate kinking of the shunt tubing within the lower neck    XR L Foot and Knee: No fracture, small suprapatellar effusion, new    CT ANGIOGRAPHY NECK: The cervical vasculature is patent without stenosis or dissection.  There is no evidence of one cerebral vascular injury.    CT ANGIOGRAPHY BRAIN: No major vessel occlusion, stenosis or aneurysm about the [place].    CERVICAL SPINE REFORMATS: No CT evidence of cervical spine fracture, traumatic malalignment or suspicious osseous lesion.    Head CT: Stable mild subarachnoid hemorrhage within the anterior frontal regions bilaterally and right sylvian fissure.  Left periorbital soft tissue hematoma with punctate hyperattenuating foci superficially near the skin surface that may reflect foreign bodies    CT Lumbar: No lumbar spine fracture or traumatic malalignment.   Incidental findings:  There is an excretion of contrast into the renal collecting systems and ureters.  There is an approximately 1.7 cm cyst within the lower pole of the left kidney (3:59).  There is limited visualization of the bladder.  There appears to be a large amount stool in the partially visualized right hemicolon.  There is submucosal fat deposition in the rectosigmoid, compatible with sequela of remote inflammation.  There are nonspecific para-aortic lymph nodes measuring up to approximately 6 mm short axis (3:93 an 3:96).  There are scattered atherosclerotic calcifications in the visualized portions of the abdominal aorta.  An infrarenal IVC filter is noted. Tertiary Trauma Survey (TTS)    Date of TTS: 10-24-22 @ 16:05                             Admit Date: 10-20-22 @ 00:40      Trauma Activation:      Subjective / 24 hour events: No acute complaints. Endorses general confusion at times but denies visual hallucinations.     Vital Signs Last 24 Hrs  T(C): 37.2 (24 Oct 2022 15:50), Max: 37.2 (24 Oct 2022 15:50)  T(F): 99 (24 Oct 2022 15:50), Max: 99 (24 Oct 2022 15:50)  HR: 92 (24 Oct 2022 15:50) (73 - 92)  BP: 123/78 (24 Oct 2022 15:50) (123/78 - 134/82)  BP(mean): --  RR: 18 (24 Oct 2022 15:50) (18 - 18)  SpO2: 93% (24 Oct 2022 15:50) (93% - 96%)    Parameters below as of 24 Oct 2022 15:50  Patient On (Oxygen Delivery Method): room air        Physical Exam:    Neuro: [ ] non focal neurological exam [X] Focal Neurological deficits noted to be: Bilateral upper extremity sensorimotor exam intact, b/l LEs with minimal motor function at baseline, sensation intact.     HEENT: [X] Normo-cephalic/atraumatic  [ ] abnormalities noted to be:    Pulm/Chest:  [X] CTA b/l  [X] chest wall non tender  [ ] abnormalities noted to be:    Cardiac: [X] S1S2, sinus rhythm  [ ] abnormalities noted to be:     GI / Abdomen: [X] Soft, non-tender, non-distended [ ] abnormalities noted to be:    Musculoskeletal / Extremities: [ ]normal active ROM  [X]  abnormalities noted to be: b/l upper extremities with normal active range of motion, b/l lower extremities with minimal active range of motion at baseline    Integumentary: [X] Skin intact [X ] Warm [ X] Dry [ ]abnormalities noted to be:    Vascular: [X ] 2+ palpable distal pulses  [ ] INOCENTE:       [ ] abnormalities noted to be:      List Injuries Identified to Date: b/l anterior SAH, R posterior parietal SAH, small L suprapatellar effusion     List Operative and Interventional Radiological Procedures:     Consults (Date):  [X  ] Neurosurgery   [  ] Orthopedics  [  ] Plastics  [  ] Urology  [  ] PM&R  [  ] Social Work    RADIOLOGICAL FINDINGS REVIEW:  XR Shunt: Right-sided  shunt. Peritubal calcification within the neck with moderate kinking of the shunt tubing within the lower neck    XR L Foot and Knee: No fracture, small suprapatellar effusion, new    CT ANGIOGRAPHY NECK: The cervical vasculature is patent without stenosis or dissection.  There is no evidence of one cerebral vascular injury.    CT ANGIOGRAPHY BRAIN: No major vessel occlusion, stenosis or aneurysm about the [place].    CERVICAL SPINE REFORMATS: No CT evidence of cervical spine fracture, traumatic malalignment or suspicious osseous lesion.    Head CT: Stable mild subarachnoid hemorrhage within the anterior frontal regions bilaterally and right sylvian fissure.  Left periorbital soft tissue hematoma with punctate hyperattenuating foci superficially near the skin surface that may reflect foreign bodies    CT Lumbar: No lumbar spine fracture or traumatic malalignment.   Incidental findings:  There is an excretion of contrast into the renal collecting systems and ureters.  There is an approximately 1.7 cm cyst within the lower pole of the left kidney (3:59).  There is limited visualization of the bladder.  There appears to be a large amount stool in the partially visualized right hemicolon.  There is submucosal fat deposition in the rectosigmoid, compatible with sequela of remote inflammation.  There are nonspecific para-aortic lymph nodes measuring up to approximately 6 mm short axis (3:93 an 3:96).  There are scattered atherosclerotic calcifications in the visualized portions of the abdominal aorta.  An infrarenal IVC filter is noted.

## 2022-10-24 NOTE — CONSULT NOTE ADULT - SUBJECTIVE AND OBJECTIVE BOX
57yM was admitted on 10-20    Patient is a 57y old  Male who presents with a chief complaint of Traumatic SAH (23 Oct 2022 01:01)    HPI:  HPI: Patient is a 57y old Male who presented on 10/20/22 s/p fall while intoxicated from his motorized wheelchair while on ASA 81 with +HS, -LOC. Patient was drinking ~700mL today when he fell while his wheelchair was going down a ramp. Fall happened approximately 30-60mins before presentation. Patient was found to have a subarachnoid hemorrhages in bilateral frontal regions and right posterior parietal region. PM&R consulted for TBI.       Imaging performed:  CT head 10/19/22: Hyperdensity within the right posterior parietal region, frontal region bilaterally and right sylvian fissure region suggesting posttraumatic subarachnoid hemorrhage in the setting of trauma. Follow-up recommended 6 to 12 hours to assess interval change.  CT head 10/20/22: Stable mild subarachnoid hemorrhage within the anterior frontal regions bilaterally and right sylvian fissure. Left periorbital soft tissue hematoma with punctate hyperattenuating foci superficially near the skin surface that may reflect foreign bodies  CT Lumbar spine 10/20/22: No lumbar spine fracture or traumatic malalignment.  CT ANGIOGRAPHY NECK 10/20: The cervical vasculature is patent without stenosis or dissection.  There is no evidence of one cerebral vascular injury.  CT ANGIOGRAPHY BRAIN 10/20: No major vessel occlusion, stenosis or aneurysm about the [place].  CT head 10/22/22: Resolution of previously noted bifrontal subarachnoid hemorrhage and air resolution of RIGHT temporal/posterior frontal subarachnoid hemorrhage. Encephalomalacia and gliosis again noted in the anterior inferior BILATERAL frontal lobes with enlargement of the anterior horns of the lateral ventricles. RIGHT frontal shunt catheter unchanged in position    REVIEW OF SYSTEMS  Constitutional - No fever, + fatigue  Respiratory - No cough, No wheezing, No shortness of breath  Cardiovascular - No chest pain, No palpitations  Gastrointestinal - No abdominal pain, No nausea, No vomiting  Neurological - No headaches, No memory loss, +loss of strength  Musculoskeletal - No joint pain, No joint swelling    VITALS  T(C): 36.7 (10-24-22 @ 07:27), Max: 36.7 (10-23-22 @ 15:20)  HR: 80 (10-24-22 @ 07:27) (73 - 80)  BP: 134/82 (10-24-22 @ 07:27) (128/78 - 143/83)  RR: 18 (10-24-22 @ 07:27) (18 - 18)  SpO2: 95% (10-24-22 @ 07:27) (95% - 96%)      PAST MEDICAL & SURGICAL HISTORY  Paraplegia    History of chronic pain    Hypertriglyceridemia    Spasticity    H/O urinary incontinence    Hypertension    Vitamin D insufficiency    Insomnia    AMILCAR treated with BiPAP    Foot drop, bilateral    2019 novel coronavirus disease (COVID-19)    H/O: depression    Seasonal allergies    No significant past surgical history    Traumatic brain injury    S/P bilateral foot surgery    S/P inguinal hernia repair    H/O surgical amputation of finger, left    S/P foot surgery, right      FUNCTIONAL HISTORY  Lives alone in an apartment complex   Wheelchair bound, has HHA 7 days a week for 8 hours per day.    CURRENT FUNCTIONAL STATUS  Pending PT/OT eval    SOCIAL HISTORY - as per documentation/history  Smoking - None  EtOH - Frequent use  Drugs - None    FAMILY HISTORY- See chart    RECENT LABS - Reviewed    ALLERGIES  No Known Allergies    MEDICATIONS   MEDICATIONS  (STANDING):  enoxaparin Injectable 40 milliGRAM(s) SubCutaneous every 24 hours  folic acid 1 milliGRAM(s) Oral daily  melatonin 3 milliGRAM(s) Oral at bedtime  multivitamin 1 Tablet(s) Oral daily  QUEtiapine 150 milliGRAM(s) Oral at bedtime  senna 2 Tablet(s) Oral at bedtime  thiamine 100 milliGRAM(s) Oral daily    MEDICATIONS  (PRN):  acetaminophen     Tablet .. 975 milliGRAM(s) Oral every 6 hours PRN Mild Pain (1 - 3)    ----------------------------------------------------------------------------------------  PHYSICAL EXAM  Constitutional - NAD, Comfortable  HEENT - Facial ecchymosis, EOMI  Chest - Breathing comfortably, No wheezing  Cardiovascular - S1S2   Abdomen - Soft   Extremities - No calf tenderness, b/l lower extremity atrophy   Neurologic Exam -                    Cognitive - AAO to self, place, date, year, situation     Communication - Fluent, No dysarthria     Cranial Nerves - CN 2-12 intact     Motor -                    LEFT    UE - ShAB 5/5, EF 5/5, EE 5/5, WE 5/5,  5/5                    RIGHT UE - ShAB 5/5, EF 5/5, EE 5/5, WE 5/5,  5/5                    LEFT    LE - HF 1/5, KE 0/5, DF 0/5, PF 0/5                    RIGHT LE - HF 2/5, KE 1/5, DF 0/5, PF 0/5        Coordination - FTN intact  ----------------------------------------------------------------------------------------  ASSESSMENT/PLAN  57yMale with functional deficits after a fall from wheelchair resulting in subarachnoid hemorrhages in bilateral frontal regions and right posterior parietal region  SAH- Monitoring, stable.   Pain - Tylenol  Mood- Seroquel  Sleep- Melatonin   DVT PPX - Lovenox, SCD's  Rehab - OT evaluation.  Expect patient to achieve functional goals for discharge home with outpatient follow up once medically cleared by primary team. Will continue to follow and current recommendations may change if functional progress changes. Recommend ongoing mobilization by staff to maintain cardiopulmonary function and prevention of secondary complications related to debility. Discussed with rehab team.

## 2022-10-24 NOTE — PHYSICAL THERAPY INITIAL EVALUATION ADULT - PERTINENT HX OF CURRENT PROBLEM, REHAB EVAL
56 yo male s/p fall while intoxicated from his motorized wheelchair with traumatic b/l frontal and R posterior parietal SAH. Tenderness in the C and L spine

## 2022-10-24 NOTE — OCCUPATIONAL THERAPY INITIAL EVALUATION ADULT - LEVEL OF INDEPENDENCE: BED TO CHAIR, REHAB EVAL
via popover/sliding board/minimum assist (75% patients effort)/moderate assist (50% patients effort)

## 2022-10-24 NOTE — DISCHARGE NOTE PROVIDER - CARE PROVIDER_API CALL
Angella Sandoval)  Neurosurgery  [address] [place] [address]  Phone: [phone]  Fax: [phone]  Follow Up Time:

## 2022-10-24 NOTE — PHYSICAL THERAPY INITIAL EVALUATION ADULT - RANGE OF MOTION EXAMINATION, REHAB EVAL
B/L LE PROM/bilateral upper extremity ROM was WFL (within functional limits)/bilateral lower extremity ROM was WFL (within functional limits)

## 2022-10-24 NOTE — OCCUPATIONAL THERAPY INITIAL EVALUATION ADULT - PLANNED THERAPY INTERVENTIONS, OT EVAL
ADL retraining/bed mobility training/motor coordination training/neuromuscular re-education/strengthening/transfer training

## 2022-10-24 NOTE — DISCHARGE NOTE PROVIDER - NSDCMRMEDTOKEN_GEN_ALL_CORE_FT
acetaminophen 500 mg oral tablet:   aspirin 81 mg oral delayed release tablet: 1 tab(s) orally once a day  atorvastatin 20 mg oral tablet: 1 tab(s) orally once a day (at bedtime)  baclofen: 30 milligram(s) orally 3 times a day  cephalexin 250 mg oral capsule: 1 cap(s) orally 2 times a day   Claritin 24 Hour Allergy 10 mg oral tablet:   dantrolene 100 mg oral capsule: 1 cap(s) orally 2 times a day  hydroCHLOROthiazide 25 mg oral tablet: 1 tab(s) orally once a day  ibuprofen 600 mg oral tablet:   Melatonin 3 mg oral tablet: 1 tab(s) orally once a day (at bedtime)  Multiple Vitamins oral capsule: 1 cap(s) orally once a day  oxycodone-acetaminophen 5 mg-325 mg oral tablet: 1 tab(s) orally every 6 hours MDD:Do not exceed 4 tablets samina  rOPINIRole 1 mg oral tablet: 1 tab(s) orally once a day (at bedtime)  SEROquel  mg oral tablet, extended release: 1 tab(s) orally once a day (in the evening)  sertraline 25 mg oral tablet: 1 tab(s) orally once a day  tadalafil 20 mg oral tablet: 1 tab(s) orally , As Needed   acetaminophen 500 mg oral tablet:   atorvastatin 20 mg oral tablet: 1 tab(s) orally once a day (at bedtime)  Claritin 24 Hour Allergy 10 mg oral tablet:   folic acid 1 mg oral tablet: 1 tab(s) orally once a day  hydroCHLOROthiazide 25 mg oral tablet: 1 tab(s) orally once a day  Melatonin 3 mg oral tablet: 1 tab(s) orally once a day (at bedtime)  Multiple Vitamins oral capsule: 1 cap(s) orally once a day  oxycodone-acetaminophen 5 mg-325 mg oral tablet: 1 tab(s) orally every 6 hours MDD:Do not exceed 4 tablets samina  rOPINIRole 1 mg oral tablet: 1 tab(s) orally once a day (at bedtime)  senna leaf extract oral tablet: 2 tab(s) orally once a day (at bedtime)  SEROquel  mg oral tablet, extended release: 1 tab(s) orally once a day (in the evening)  sertraline 25 mg oral tablet: 1 tab(s) orally once a day  tadalafil 20 mg oral tablet: 1 tab(s) orally , As Needed  thiamine 100 mg oral tablet: 1 tab(s) orally once a day

## 2022-10-24 NOTE — PHYSICAL THERAPY INITIAL EVALUATION ADULT - ADDITIONAL COMMENTS
Pt reports having multiple residences. Pt reports living in a senior complex with elevator and 0 steps. Pt has aide 8 hrs for 7 days/ week. Pt uses motorized w/c for mobility and reports having assist for transfers, ADLs, and IADLs. Pt reports using slide board/ popover for transfers and does not amb. Pt owns 2 motorized w/c, manual w/c, commode, tub bench, slide board, B/L AFOs, "bed chair", reacher.

## 2022-10-24 NOTE — DISCHARGE NOTE PROVIDER - NSDCFUSCHEDAPPT_GEN_ALL_CORE_FT
[facility] [address]  Scheduled Appointment: 10/25/2022

## 2022-10-25 ENCOUNTER — APPOINTMENT (OUTPATIENT)
Dept: FAMILY MEDICINE | Facility: CLINIC | Age: 57
End: 2022-10-25

## 2022-10-25 ENCOUNTER — TRANSCRIPTION ENCOUNTER (OUTPATIENT)
Age: 57
End: 2022-10-25

## 2022-10-25 VITALS
OXYGEN SATURATION: 96 % | RESPIRATION RATE: 16 BRPM | DIASTOLIC BLOOD PRESSURE: 85 MMHG | SYSTOLIC BLOOD PRESSURE: 123 MMHG | HEART RATE: 81 BPM | TEMPERATURE: 98 F

## 2022-10-25 PROCEDURE — 99233 SBSQ HOSP IP/OBS HIGH 50: CPT

## 2022-10-25 RX ADMIN — Medication 100 MILLIGRAM(S): at 11:15

## 2022-10-25 RX ADMIN — Medication 1 TABLET(S): at 11:16

## 2022-10-25 RX ADMIN — Medication 975 MILLIGRAM(S): at 09:35

## 2022-10-25 RX ADMIN — Medication 975 MILLIGRAM(S): at 08:44

## 2022-10-25 RX ADMIN — Medication 1 MILLIGRAM(S): at 11:15

## 2022-10-25 NOTE — DISCHARGE NOTE NURSING/CASE MANAGEMENT/SOCIAL WORK - NSDCVIVACCINE_GEN_ALL_CORE_FT
Tdap; 20-Oct-2022 00:17; Annie Louis (RN); [employer]; [id number] (Exp. Date: 06-Dec-2024); IntraMuscular; Deltoid Left.; 0.5 milliLiter(s); VIS (VIS Published: 09-May-2013, VIS Presented: 20-Oct-2022);

## 2022-10-25 NOTE — DISCHARGE NOTE NURSING/CASE MANAGEMENT/SOCIAL WORK - NSDCPEFALRISK_GEN_ALL_CORE
For information on Fall & Injury Prevention, visit: [email or web address] OR  [email or web address] OR  https://www.cdc.gov/steadi/patient.html
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
## 2022-10-25 NOTE — DISCHARGE NOTE NURSING/CASE MANAGEMENT/SOCIAL WORK - PATIENT PORTAL LINK FT
You can access the FollowMyHealth Patient Portal offered by [facility] by registering at the following website: [email or web address]. By joining [email or web address]’s FollowMyHealth portal, you will also be able to view your health information using other applications (apps) compatible with our system.
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
## 2022-11-03 ENCOUNTER — APPOINTMENT (OUTPATIENT)
Dept: FAMILY MEDICINE | Facility: CLINIC | Age: 57
End: 2022-11-03

## 2022-11-03 VITALS
HEIGHT: 72 IN | BODY MASS INDEX: 30.48 KG/M2 | SYSTOLIC BLOOD PRESSURE: 132 MMHG | HEART RATE: 77 BPM | DIASTOLIC BLOOD PRESSURE: 82 MMHG | OXYGEN SATURATION: 98 % | WEIGHT: 225 LBS

## 2022-11-03 DIAGNOSIS — Z09 ENCOUNTER FOR FOLLOW-UP EXAMINATION AFTER COMPLETED TREATMENT FOR CONDITIONS OTHER THAN MALIGNANT NEOPLASM: ICD-10-CM
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
## 2022-11-03 PROCEDURE — 99214 OFFICE O/P EST MOD 30 MIN: CPT

## 2022-11-03 RX ORDER — CEPHALEXIN 250 MG/1
250 TABLET ORAL
Qty: 10 | Refills: 0 | Status: DISCONTINUED | COMMUNITY
Start: 2022-09-20 | End: 2022-11-03

## 2022-11-04 ENCOUNTER — RX RENEWAL (OUTPATIENT)
Age: 57
End: 2022-11-04

## 2022-11-08 PROCEDURE — 70250 X-RAY EXAM OF SKULL: CPT

## 2022-11-08 PROCEDURE — 85730 THROMBOPLASTIN TIME PARTIAL: CPT

## 2022-11-08 PROCEDURE — 87637 SARSCOV2&INF A&B&RSV AMP PRB: CPT

## 2022-11-08 PROCEDURE — 84100 ASSAY OF PHOSPHORUS: CPT

## 2022-11-08 PROCEDURE — G1004: CPT

## 2022-11-08 PROCEDURE — 70496 CT ANGIOGRAPHY HEAD: CPT | Mod: MG

## 2022-11-08 PROCEDURE — 74018 RADEX ABDOMEN 1 VIEW: CPT

## 2022-11-08 PROCEDURE — 85027 COMPLETE CBC AUTOMATED: CPT

## 2022-11-08 PROCEDURE — 36415 COLL VENOUS BLD VENIPUNCTURE: CPT

## 2022-11-08 PROCEDURE — 83735 ASSAY OF MAGNESIUM: CPT

## 2022-11-08 PROCEDURE — 97163 PT EVAL HIGH COMPLEX 45 MIN: CPT

## 2022-11-08 PROCEDURE — 73620 X-RAY EXAM OF FOOT: CPT

## 2022-11-08 PROCEDURE — 70498 CT ANGIOGRAPHY NECK: CPT | Mod: MG

## 2022-11-08 PROCEDURE — 80053 COMPREHEN METABOLIC PANEL: CPT

## 2022-11-08 PROCEDURE — 93005 ELECTROCARDIOGRAM TRACING: CPT

## 2022-11-08 PROCEDURE — 90471 IMMUNIZATION ADMIN: CPT

## 2022-11-08 PROCEDURE — 80307 DRUG TEST PRSMV CHEM ANLYZR: CPT

## 2022-11-08 PROCEDURE — 86850 RBC ANTIBODY SCREEN: CPT

## 2022-11-08 PROCEDURE — 81001 URINALYSIS AUTO W/SCOPE: CPT

## 2022-11-08 PROCEDURE — 73560 X-RAY EXAM OF KNEE 1 OR 2: CPT

## 2022-11-08 PROCEDURE — 72125 CT NECK SPINE W/O DYE: CPT | Mod: MG

## 2022-11-08 PROCEDURE — 72131 CT LUMBAR SPINE W/O DYE: CPT | Mod: MA

## 2022-11-08 PROCEDURE — 12001 RPR S/N/AX/GEN/TRNK 2.5CM/<: CPT

## 2022-11-08 PROCEDURE — 86900 BLOOD TYPING SEROLOGIC ABO: CPT

## 2022-11-08 PROCEDURE — 87641 MR-STAPH DNA AMP PROBE: CPT

## 2022-11-08 PROCEDURE — 85610 PROTHROMBIN TIME: CPT

## 2022-11-08 PROCEDURE — 85025 COMPLETE CBC W/AUTO DIFF WBC: CPT

## 2022-11-08 PROCEDURE — 71045 X-RAY EXAM CHEST 1 VIEW: CPT

## 2022-11-08 PROCEDURE — 80048 BASIC METABOLIC PNL TOTAL CA: CPT

## 2022-11-08 PROCEDURE — 86901 BLOOD TYPING SEROLOGIC RH(D): CPT

## 2022-11-08 PROCEDURE — 70450 CT HEAD/BRAIN W/O DYE: CPT | Mod: MG

## 2022-11-08 PROCEDURE — 90715 TDAP VACCINE 7 YRS/> IM: CPT

## 2022-11-08 PROCEDURE — 99291 CRITICAL CARE FIRST HOUR: CPT

## 2022-11-08 PROCEDURE — 87640 STAPH A DNA AMP PROBE: CPT

## 2022-11-09 ENCOUNTER — RX RENEWAL (OUTPATIENT)
Age: 57
End: 2022-11-09

## 2022-11-16 ENCOUNTER — APPOINTMENT (OUTPATIENT)
Dept: UROLOGY | Facility: CLINIC | Age: 57
End: 2022-11-16

## 2022-11-16 VITALS — OXYGEN SATURATION: 94 % | HEART RATE: 89 BPM | SYSTOLIC BLOOD PRESSURE: 113 MMHG | DIASTOLIC BLOOD PRESSURE: 76 MMHG

## 2022-11-16 DIAGNOSIS — R82.90 UNSPECIFIED ABNORMAL FINDINGS IN URINE: ICD-10-CM

## 2022-11-16 PROCEDURE — 99213 OFFICE O/P EST LOW 20 MIN: CPT

## 2022-11-16 RX ORDER — OXYCODONE AND ACETAMINOPHEN 5; 325 MG/1; MG/1
5-325 TABLET ORAL
Qty: 20 | Refills: 0 | Status: DISCONTINUED | COMMUNITY
Start: 2022-09-20 | End: 2022-11-16

## 2022-11-16 RX ORDER — CEPHALEXIN 250 MG/1
250 CAPSULE ORAL
Qty: 10 | Refills: 0 | Status: DISCONTINUED | COMMUNITY
Start: 2022-09-20

## 2022-11-16 RX ORDER — TAMSULOSIN HYDROCHLORIDE 0.4 MG/1
0.4 CAPSULE ORAL
Qty: 90 | Refills: 0 | Status: DISCONTINUED | COMMUNITY
Start: 2022-03-17 | End: 2022-11-16

## 2022-11-16 NOTE — PHYSICAL EXAM
[General Appearance - Well Developed] : well developed [General Appearance - Well Nourished] : well nourished [Normal Appearance] : normal appearance [Well Groomed] : well groomed [General Appearance - In No Acute Distress] : no acute distress [Edema] : no peripheral edema [] : no respiratory distress [Respiration, Rhythm And Depth] : normal respiratory rhythm and effort [Exaggerated Use Of Accessory Muscles For Inspiration] : no accessory muscle use [FreeTextEntry1] : alerted mood and affect.

## 2022-11-16 NOTE — HISTORY OF PRESENT ILLNESS
[FreeTextEntry1] : Urinary urgencyand dysuria over the last few weeks.  has noted hematuria.  no fevers or chills.  he does not feel empty.

## 2022-11-16 NOTE — LETTER BODY
[Dear  ___] : Dear  [unfilled], [Courtesy Letter:] : I had the pleasure of seeing your patient, [unfilled], in my office today. [Please see my note below.] : Please see my note below. [Sincerely,] : Sincerely, [FreeTextEntry3] : Ed\par \par Abraham Lee MD\par [facility]\par  of Urology\par [employer] at [facility]\par

## 2022-11-16 NOTE — ASSESSMENT
[FreeTextEntry1] : Impression:\par \par will get a urine culture\par Plan:\par \par start augmentin\par get urine culture \par \par followup in 1 month.

## 2022-11-17 ENCOUNTER — RX RENEWAL (OUTPATIENT)
Age: 57
End: 2022-11-17

## 2022-11-18 ENCOUNTER — APPOINTMENT (OUTPATIENT)
Dept: FAMILY MEDICINE | Facility: CLINIC | Age: 57
End: 2022-11-18

## 2022-11-18 VITALS
TEMPERATURE: 98.2 F | HEART RATE: 80 BPM | OXYGEN SATURATION: 98 % | WEIGHT: 225 LBS | DIASTOLIC BLOOD PRESSURE: 90 MMHG | BODY MASS INDEX: 30.48 KG/M2 | HEIGHT: 72 IN | SYSTOLIC BLOOD PRESSURE: 140 MMHG

## 2022-11-18 PROCEDURE — 99213 OFFICE O/P EST LOW 20 MIN: CPT

## 2022-11-18 RX ORDER — AMOXICILLIN AND CLAVULANATE POTASSIUM 875; 125 MG/1; MG/1
875-125 TABLET, COATED ORAL
Qty: 14 | Refills: 0 | Status: DISCONTINUED | COMMUNITY
Start: 2022-11-16 | End: 2022-11-18

## 2022-11-18 NOTE — PHYSICAL EXAM
[Normal] : affect was normal and insight and judgment were intact [de-identified] : 4 mm laceration left eyebrow healing, 1 suture seen, likely healed over additional sutures
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
## 2022-11-18 NOTE — ASSESSMENT
[FreeTextEntry1] : Laceration forehead: sutures likely healed over by skin, unclear if dissolvable vs permanent suture, refer to ER for removal \par RTC 2 wks
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
## 2022-11-18 NOTE — ASSESSMENT
[FreeTextEntry1] : SAH/Fall, subsequent encounter: no neurologic changes, reports feeling well, monitored in hospital [facility], f/u neurosurgery Dr. Barraza, advised to use wheelchair seatbelt, f/u drug test, discussed safe drinking\par HTN: bp within acceptable range, c/w current regimen, recommend low salt diet, DASH diet\par RTC 4 wks

## 2022-11-18 NOTE — PHYSICAL EXAM
[Normal] : affect was normal and insight and judgment were intact [de-identified] : paraplegic, motor strength 0/5 b/l LE, 5/5 b/l UE, sensation intact

## 2022-11-21 ENCOUNTER — APPOINTMENT (OUTPATIENT)
Dept: FAMILY MEDICINE | Facility: CLINIC | Age: 57
End: 2022-11-21

## 2022-11-21 LAB — BACTERIA UR CULT: ABNORMAL

## 2022-11-23 NOTE — ASSESSMENT
Erlinda Jo is a 30 month old male, c/o small red bumps to right lower arm > left lower arm, along with blister like lesion to palm of right hand. Denies any other affected skin areas. Denies fever or any other medical complaint. Good PO intake, no changes in bladder/bowel habits    TX: hydrocortisone applied with no relief.     Visit Vitals  Pulse 114   Temp 98.1 °F (36.7 °C) (Axillary)   Resp 26   SpO2 99%          [FreeTextEntry1] : Mr. MOOKIE PIPER is a 56 year old male with a hx of TBI with resultant paraplegia who presents with low back pain. His back pain is most consistent with myofascial pain and has improved with PT. His main limiting problem is his spasticity.  Denies any red flag signs. Overall improved pain and function with PT. \par \par Will recommend:\par - Will transition from PT to HEP\par - Pending Baclofen pump trial next month. \par - He will continue PO Baclofen as given by outside provider. \par - Patient to follow up with Dr. London as indicated\par \par RTC as needed. Patient aware of red flag signs including any changes to their bowel/bladder control, groin numbness or new weakness. Patient knows to seek immediate attention by calling 911 or going to nearest ER if these symptoms appear.

## 2022-12-18 ENCOUNTER — RX RENEWAL (OUTPATIENT)
Age: 57
End: 2022-12-18

## 2022-12-19 ENCOUNTER — APPOINTMENT (OUTPATIENT)
Dept: UROLOGY | Facility: CLINIC | Age: 57
End: 2022-12-19

## 2022-12-26 ENCOUNTER — RX RENEWAL (OUTPATIENT)
Age: 57
End: 2022-12-26

## 2022-12-27 ENCOUNTER — APPOINTMENT (OUTPATIENT)
Dept: FAMILY MEDICINE | Facility: CLINIC | Age: 57
End: 2022-12-27

## 2022-12-27 VITALS
HEIGHT: 72 IN | TEMPERATURE: 98 F | DIASTOLIC BLOOD PRESSURE: 84 MMHG | SYSTOLIC BLOOD PRESSURE: 140 MMHG | HEART RATE: 75 BPM | WEIGHT: 225 LBS | BODY MASS INDEX: 30.48 KG/M2 | OXYGEN SATURATION: 98 %

## 2022-12-27 DIAGNOSIS — M25.649 STIFFNESS OF UNSPECIFIED HAND, NOT ELSEWHERE CLASSIFIED: ICD-10-CM

## 2022-12-27 PROCEDURE — 36415 COLL VENOUS BLD VENIPUNCTURE: CPT

## 2022-12-27 PROCEDURE — 99213 OFFICE O/P EST LOW 20 MIN: CPT | Mod: 25

## 2022-12-27 NOTE — ASSESSMENT
[FreeTextEntry1] : B/l LE edema: suspect medication related, on ropinirole, unable to tolerate discontinuing medication due to RLS, f/u US duplex, f/u labwork, recommend cardiac evaluation\par B/l hand stiffness: hx of CMC arthritis b/l, pt reports intermittent stiffness, PE wnl (amputated digit), motor strength and sensation intact, had xray last year, refer to hand specialist\par RTC 1 wk

## 2022-12-27 NOTE — PHYSICAL EXAM
[No Varicosities] : no varicosities [Normal] : affect was normal and insight and judgment were intact [de-identified] : b/l LE edema 2+ [de-identified] : paralysis b/l LE, UE 5/5, sensation intact, wheelchair bound

## 2022-12-27 NOTE — HISTORY OF PRESENT ILLNESS
[FreeTextEntry8] : 56 yo male presents with complaint of swelling of both legs. He says its been going on for the past 6 months. He reports that it is intermittent. He is paralyzed waist down. No hx of varicose veins. No SOB. No orthopnea, PND, cp, palpitations.

## 2022-12-27 NOTE — REVIEW OF SYSTEMS
[Chest Pain] : no chest pain [Palpitations] : no palpitations [Claudication] : no  leg claudication [Lower Ext Edema] : lower extremity edema [Orthopena] : no orthopnea [Paroxysmal Nocturnal Dyspnea] : no paroxysmal nocturnal dyspnea [Negative] : Psychiatric

## 2022-12-30 ENCOUNTER — RX RENEWAL (OUTPATIENT)
Age: 57
End: 2022-12-30

## 2023-01-02 ENCOUNTER — RX RENEWAL (OUTPATIENT)
Age: 58
End: 2023-01-02

## 2023-01-03 ENCOUNTER — RX RENEWAL (OUTPATIENT)
Age: 58
End: 2023-01-03

## 2023-01-03 LAB
ALBUMIN SERPL ELPH-MCNC: 4.4 G/DL
ALP BLD-CCNC: 90 U/L
ALT SERPL-CCNC: 16 U/L
ANA SER IF-ACNC: NEGATIVE
ANION GAP SERPL CALC-SCNC: 16 MMOL/L
AST SERPL-CCNC: 18 U/L
BASOPHILS # BLD AUTO: 0.08 K/UL
BASOPHILS NFR BLD AUTO: 1.3 %
BILIRUB SERPL-MCNC: 0.2 MG/DL
BUN SERPL-MCNC: 20 MG/DL
CALCIUM SERPL-MCNC: 9.4 MG/DL
CCP AB SER IA-ACNC: <8 UNITS
CHLORIDE SERPL-SCNC: 102 MMOL/L
CO2 SERPL-SCNC: 26 MMOL/L
CREAT SERPL-MCNC: 0.79 MG/DL
EGFR: 105 ML/MIN/1.73M2
EOSINOPHIL # BLD AUTO: 0.16 K/UL
EOSINOPHIL NFR BLD AUTO: 2.7 %
ERYTHROCYTE [SEDIMENTATION RATE] IN BLOOD BY WESTERGREN METHOD: 22 MM/HR
GLUCOSE SERPL-MCNC: 91 MG/DL
HCT VFR BLD CALC: 49.9 %
HGB BLD-MCNC: 16.4 G/DL
IMM GRANULOCYTES NFR BLD AUTO: 0.3 %
LYMPHOCYTES # BLD AUTO: 1.31 K/UL
LYMPHOCYTES NFR BLD AUTO: 22 %
MAN DIFF?: NORMAL
MCHC RBC-ENTMCNC: 29.3 PG
MCHC RBC-ENTMCNC: 32.9 GM/DL
MCV RBC AUTO: 89.1 FL
MONOCYTES # BLD AUTO: 0.82 K/UL
MONOCYTES NFR BLD AUTO: 13.8 %
NEUTROPHILS # BLD AUTO: 3.56 K/UL
NEUTROPHILS NFR BLD AUTO: 59.9 %
PLATELET # BLD AUTO: 201 K/UL
POTASSIUM SERPL-SCNC: 3.3 MMOL/L
PROT SERPL-MCNC: 6.4 G/DL
RBC # BLD: 5.6 M/UL
RBC # FLD: 14.3 %
RF+CCP IGG SER-IMP: NEGATIVE
RHEUMATOID FACT SER QL: 11 IU/ML
SODIUM SERPL-SCNC: 144 MMOL/L
TSH SERPL-ACNC: 1.46 UIU/ML
WBC # FLD AUTO: 5.95 K/UL

## 2023-01-05 ENCOUNTER — APPOINTMENT (OUTPATIENT)
Dept: FAMILY MEDICINE | Facility: CLINIC | Age: 58
End: 2023-01-05

## 2023-01-12 ENCOUNTER — RX RENEWAL (OUTPATIENT)
Age: 58
End: 2023-01-12

## 2023-01-20 ENCOUNTER — APPOINTMENT (OUTPATIENT)
Dept: FAMILY MEDICINE | Facility: CLINIC | Age: 58
End: 2023-01-20
Payer: MEDICARE

## 2023-01-20 ENCOUNTER — APPOINTMENT (OUTPATIENT)
Dept: FAMILY MEDICINE | Facility: CLINIC | Age: 58
End: 2023-01-20

## 2023-01-20 PROCEDURE — 36415 COLL VENOUS BLD VENIPUNCTURE: CPT

## 2023-01-22 DIAGNOSIS — R70.0 ELEVATED ERYTHROCYTE SEDIMENTATION RATE: ICD-10-CM

## 2023-01-22 DIAGNOSIS — Z86.39 PERSONAL HISTORY OF OTHER ENDOCRINE, NUTRITIONAL AND METABOLIC DISEASE: ICD-10-CM

## 2023-01-22 LAB
ANION GAP SERPL CALC-SCNC: 14 MMOL/L
BUN SERPL-MCNC: 15 MG/DL
CALCIUM SERPL-MCNC: 9.4 MG/DL
CHLORIDE SERPL-SCNC: 105 MMOL/L
CO2 SERPL-SCNC: 24 MMOL/L
CREAT SERPL-MCNC: 0.91 MG/DL
EGFR: 100 ML/MIN/1.73M2
ERYTHROCYTE [SEDIMENTATION RATE] IN BLOOD BY WESTERGREN METHOD: 13 MM/HR
GLUCOSE SERPL-MCNC: 98 MG/DL
POTASSIUM SERPL-SCNC: 3.7 MMOL/L
SODIUM SERPL-SCNC: 143 MMOL/L

## 2023-01-23 ENCOUNTER — APPOINTMENT (OUTPATIENT)
Dept: UROLOGY | Facility: CLINIC | Age: 58
End: 2023-01-23

## 2023-01-24 ENCOUNTER — APPOINTMENT (OUTPATIENT)
Dept: ORTHOPEDIC SURGERY | Facility: CLINIC | Age: 58
End: 2023-01-24
Payer: MEDICARE

## 2023-01-24 VITALS
TEMPERATURE: 97.9 F | BODY MASS INDEX: 31.15 KG/M2 | DIASTOLIC BLOOD PRESSURE: 83 MMHG | WEIGHT: 230 LBS | HEART RATE: 80 BPM | SYSTOLIC BLOOD PRESSURE: 127 MMHG | HEIGHT: 72 IN

## 2023-01-24 PROCEDURE — 99204 OFFICE O/P NEW MOD 45 MIN: CPT | Mod: 25

## 2023-01-24 PROCEDURE — 20550 NJX 1 TENDON SHEATH/LIGAMENT: CPT | Mod: F7

## 2023-01-24 PROCEDURE — 73130 X-RAY EXAM OF HAND: CPT | Mod: 50

## 2023-01-24 NOTE — DISCUSSION/SUMMARY
[FreeTextEntry1] : 1.  I am hopeful of the injection to the right index finger and ring fingers will lead to improvement of his right hand tendinopathy.  For now we will observe the left-sided symptoms

## 2023-01-24 NOTE — HISTORY OF PRESENT ILLNESS
[FreeTextEntry1] : Duncan is a pleasant 57-year-old male who presents today with multiple month history of bilateral hand cramping with stiffness at multiple PIP joints.  He specifically complains of right index and right ring finger pain

## 2023-01-24 NOTE — ASSESSMENT
[FreeTextEntry1] : ASSESSMENT:\par \par The patient comes in today with a multiple month complaint of bilateral hand pain complicated by tendinosis and stiffness at both hands today mostly complaining of right index and right ring as well as left middle finger.  He has elected for injection therapy for the right index and ring fingers\par [I have diagnosed the patient today with a new diagnosis.]\par [This is likely to diminish bodily function for the extremity.] \par \par [The patient was given an injection today.]\par Injection:\par \par The risks and benefits of a steroid injection were discussed in detail. The risks include but are not limited to: pain, infection, swelling, flare response, bleeding, subcutaneous fat atrophy, skin depigmentation and/or elevation of blood sugar. The risk of incomplete resolution of symptoms, recurrence and additional intervention was reviewed and considered by the patient. \par The patient agreed to proceed and under a sterile prep, I injected 1 unit into 2 cc of a combination of Celestone and Lidocaine into the right index finger and right ring finger A1 pulleys as 2 separate injections. The patient tolerated the injection well.\par \par He was adequately and thoroughly informed of my assessment of their current condition(s). \par \par \par \par

## 2023-01-24 NOTE — PHYSICAL EXAM
[de-identified] : He is well-appearing on examination\par \par Examination of bilateral hands reveals tenderness at the level of the right index finger and right ring finger A1 pulleys with a palpable click on assessment.  There is stiffness at the PIP joints.  On the left side there is tenderness at the level of the middle finger A1 pulley. [de-identified] : [4] views of bilateral hands and wrists were obtained today in my office and were seen by me and discussed with the patient. \par These show findings consistent with basal joint OA bilaterally\par

## 2023-01-24 NOTE — CONSULT LETTER
[Dear  ___] : Dear  [unfilled], [Consult Letter:] : I had the pleasure of evaluating your patient, [unfilled]. [Please see my note below.] : Please see my note below. [Consult Closing:] : Thank you very much for allowing me to participate in the care of this patient.  If you have any questions, please do not hesitate to contact me. [Sincerely,] : Sincerely, [FreeTextEntry2] : Zunilda Reid MD\[facility] [address]\[place] [id number] [FreeTextEntry3] : Ravi Daniel MD

## 2023-01-25 ENCOUNTER — APPOINTMENT (OUTPATIENT)
Dept: FAMILY MEDICINE | Facility: CLINIC | Age: 58
End: 2023-01-25

## 2023-01-25 RX ORDER — BACLOFEN 10 MG/1
10 TABLET ORAL 3 TIMES DAILY
Qty: 180 | Refills: 0 | Status: ACTIVE | COMMUNITY
Start: 2021-09-01 | End: 1900-01-01

## 2023-01-30 ENCOUNTER — APPOINTMENT (OUTPATIENT)
Dept: UROLOGY | Facility: CLINIC | Age: 58
End: 2023-01-30
Payer: MEDICARE

## 2023-01-30 ENCOUNTER — APPOINTMENT (OUTPATIENT)
Dept: FAMILY MEDICINE | Facility: CLINIC | Age: 58
End: 2023-01-30
Payer: MEDICARE

## 2023-01-30 VITALS
HEIGHT: 72 IN | DIASTOLIC BLOOD PRESSURE: 80 MMHG | HEART RATE: 73 BPM | OXYGEN SATURATION: 95 % | TEMPERATURE: 97.5 F | SYSTOLIC BLOOD PRESSURE: 128 MMHG

## 2023-01-30 DIAGNOSIS — G25.81 RESTLESS LEGS SYNDROME: ICD-10-CM

## 2023-01-30 DIAGNOSIS — S01.81XD LACERATION W/OUT FOREIGN BODY OF OTHER PART OF HEAD, SUBSEQUENT ENCOUNTER: ICD-10-CM

## 2023-01-30 DIAGNOSIS — Z13.29 ENCOUNTER FOR SCREENING FOR OTHER SUSPECTED ENDOCRINE DISORDER: ICD-10-CM

## 2023-01-30 DIAGNOSIS — E55.9 VITAMIN D DEFICIENCY, UNSPECIFIED: ICD-10-CM

## 2023-01-30 DIAGNOSIS — Z87.898 PERSONAL HISTORY OF OTHER SPECIFIED CONDITIONS: ICD-10-CM

## 2023-01-30 DIAGNOSIS — Z12.11 ENCOUNTER FOR SCREENING FOR MALIGNANT NEOPLASM OF COLON: ICD-10-CM

## 2023-01-30 DIAGNOSIS — R39.12 POOR URINARY STREAM: ICD-10-CM

## 2023-01-30 DIAGNOSIS — Z78.9 OTHER SPECIFIED HEALTH STATUS: ICD-10-CM

## 2023-01-30 DIAGNOSIS — Z13.1 ENCOUNTER FOR SCREENING FOR DIABETES MELLITUS: ICD-10-CM

## 2023-01-30 DIAGNOSIS — E78.1 PURE HYPERGLYCERIDEMIA: ICD-10-CM

## 2023-01-30 DIAGNOSIS — S06.9XAA UNSPECIFIED INTRACRANIAL INJURY WITH LOSS OF CONSCIOUSNESS STATUS UNKNOWN, INITIAL ENCOUNTER: ICD-10-CM

## 2023-01-30 DIAGNOSIS — M54.9 DORSALGIA, UNSPECIFIED: ICD-10-CM

## 2023-01-30 DIAGNOSIS — M79.89 OTHER SPECIFIED SOFT TISSUE DISORDERS: ICD-10-CM

## 2023-01-30 DIAGNOSIS — M25.541 PAIN IN JOINTS OF RIGHT HAND: ICD-10-CM

## 2023-01-30 DIAGNOSIS — Z13.220 ENCOUNTER FOR SCREENING FOR LIPOID DISORDERS: ICD-10-CM

## 2023-01-30 DIAGNOSIS — M25.542 PAIN IN JOINTS OF RIGHT HAND: ICD-10-CM

## 2023-01-30 DIAGNOSIS — N39.0 URINARY TRACT INFECTION, SITE NOT SPECIFIED: ICD-10-CM

## 2023-01-30 DIAGNOSIS — Z00.00 ENCOUNTER FOR GENERAL ADULT MEDICAL EXAMINATION W/OUT ABNORMAL FINDINGS: ICD-10-CM

## 2023-01-30 DIAGNOSIS — R60.0 LOCALIZED EDEMA: ICD-10-CM

## 2023-01-30 DIAGNOSIS — Z87.09 PERSONAL HISTORY OF OTHER DISEASES OF THE RESPIRATORY SYSTEM: ICD-10-CM

## 2023-01-30 DIAGNOSIS — F32.A DEPRESSION, UNSPECIFIED: ICD-10-CM

## 2023-01-30 DIAGNOSIS — G82.20 PARAPLEGIA, UNSPECIFIED: ICD-10-CM

## 2023-01-30 PROCEDURE — G0439: CPT

## 2023-01-30 PROCEDURE — G0444 DEPRESSION SCREEN ANNUAL: CPT

## 2023-01-30 PROCEDURE — 36415 COLL VENOUS BLD VENIPUNCTURE: CPT

## 2023-01-30 PROCEDURE — 99214 OFFICE O/P EST MOD 30 MIN: CPT

## 2023-01-30 RX ORDER — L. ACIDOPH/L. PLANTAR/L. RHAMN 1B CELL
CAPSULE,DELAYED RELEASE (ENTERIC COATED) ORAL
Qty: 3 | Refills: 0 | Status: DISCONTINUED | COMMUNITY
Start: 2021-09-01 | End: 2023-01-30

## 2023-01-30 RX ORDER — TRIAMCINOLONE ACETONIDE 5 MG/G
0.5 CREAM TOPICAL 3 TIMES DAILY
Qty: 30 | Refills: 0 | Status: DISCONTINUED | COMMUNITY
Start: 2022-08-09 | End: 2023-01-30

## 2023-01-30 RX ORDER — TADALAFIL 20 MG/1
20 TABLET ORAL
Qty: 90 | Refills: 1 | Status: ACTIVE | COMMUNITY
Start: 2020-12-07 | End: 1900-01-01
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
## 2023-01-30 NOTE — HEALTH RISK ASSESSMENT
[Good] : ~his/her~  mood as  good [Former] : Former [10-14] : 10-14 [> 15 Years] : > 15 Years [Yes] : Yes [2 - 3 times a week (3 pts)] : 2 - 3  times a week (3 points) [3 or 4 (1 pt)] : 3 or 4  (1 point) [Weekly (3 pts)] : Weekly (3 points) [No] : In the past 12 months have you used drugs other than those required for medical reasons? No [3] : 2) Feeling down, depressed, or hopeless for nearly every day (3) [PHQ-2 Positive] : PHQ-2 Positive [Nearly Every Day (3)] : 4.) Feeling tired or having little energy? Nearly every day [1/2 of Days or More (2)] : 6.) Feeling bad about yourself, or that you are a failure, or have let yourself or your family down? Half the days or more [Not at All (0)] : 8.) Moving or speaking so slowly that other people could have noticed, or the opposite, moving or speaking faster than usual? Not at all [Moderate] : severity of depression is moderate [Somewhat Difficult] : How difficult have these problems made it for you to do your work, take care of things at home, or get along with people? Somewhat difficult [PHQ-9 Positive] : PHQ-9 Positive [I have developed a follow-up plan documented below in the note.] : I have developed a follow-up plan documented below in the note. [HIV test declined] : HIV test declined [Hepatitis C test declined] : Hepatitis C test declined [Alone] : lives alone [On disability] : on disability [Single] : single [Feels Safe at Home] : Feels safe at home [de-identified] : 11 years, 1 ppd [Audit-CScore] : 7 [de-identified] : m [de-identified] : needs improvement [de-identified] : healthy [QKH9Vasrj] : 6 [[id number]] : 14 [Sexually Active] : not sexually active [Reports changes in hearing] : Reports no changes in hearing [Reports changes in vision] : Reports no changes in vision [Reports changes in dental health] : Reports no changes in dental health

## 2023-01-30 NOTE — COUNSELING
[Fall prevention counseling provided] : Fall prevention counseling provided [Adequate lighting] : Adequate lighting [No throw rugs] : No throw rugs [Use proper foot wear] : Use proper foot wear [Use recommended devices] : Use recommended devices [Behavioral health counseling provided] : Behavioral health counseling provided [AUDIT-C Screening administered and reviewed] : AUDIT-C Screening administered and reviewed [Hazards of at-risk alcohol use discussed] : Hazards of at-risk alcohol use discussed [Strategies to reduce or eliminate alcohol use discussed] : Strategies to reduce or eliminate alcohol use discussed [Support options provided] : Support options provided [Potential consequences of obesity discussed] : Potential consequences of obesity discussed [Benefits of weight loss discussed] : Benefits of weight loss discussed [Encouraged to increase physical activity] : Encouraged to increase physical activity

## 2023-01-30 NOTE — ASSESSMENT
[FreeTextEntry1] : Impression:\par \par weak stream\par ED\par \par Plan:\par \par refill tadalafil\par refill tamsulosin\par followup 6 months.

## 2023-01-30 NOTE — PHYSICAL EXAM
[Normal] : affect was normal and insight and judgment were intact [de-identified] : b/l LE edema 1+ [de-identified] : b/l LE sensation intact,b/l UE motor strength 5/5, no spasticity noted

## 2023-01-30 NOTE — HISTORY OF PRESENT ILLNESS
[FreeTextEntry1] : CPE [de-identified] : 58 yo male presents for annual physical. He has complaint of fatigue. He says its been going on for years. No fever, chills, cp, palpitations, sob, nvcd.

## 2023-01-30 NOTE — PHYSICAL EXAM
[General Appearance - Well Developed] : well developed [General Appearance - Well Nourished] : well nourished [Normal Appearance] : normal appearance [Well Groomed] : well groomed [General Appearance - In No Acute Distress] : no acute distress [Skin Color & Pigmentation] : normal skin color and pigmentation [Edema] : no peripheral edema [] : no respiratory distress [Respiration, Rhythm And Depth] : normal respiratory rhythm and effort [Exaggerated Use Of Accessory Muscles For Inspiration] : no accessory muscle use [FreeTextEntry1] : IN wheelchair

## 2023-01-30 NOTE — HISTORY OF PRESENT ILLNESS
[FreeTextEntry1] : Patient on tamsulosin.  Has a better stream.  No post void fullness.  Nocturia not an issue.  no hematuria or dysuria. \par \par On tadalafil.  he is very pleased with the effectiveness of the medication.  no urgency.  good libido.

## 2023-02-07 ENCOUNTER — APPOINTMENT (OUTPATIENT)
Dept: FAMILY MEDICINE | Facility: CLINIC | Age: 58
End: 2023-02-07

## 2023-02-17 ENCOUNTER — APPOINTMENT (OUTPATIENT)
Dept: ORTHOPEDIC SURGERY | Facility: CLINIC | Age: 58
End: 2023-02-17
Payer: MEDICARE

## 2023-02-17 VITALS
BODY MASS INDEX: 31.15 KG/M2 | HEIGHT: 72 IN | DIASTOLIC BLOOD PRESSURE: 80 MMHG | WEIGHT: 230 LBS | SYSTOLIC BLOOD PRESSURE: 128 MMHG

## 2023-02-17 DIAGNOSIS — M79.641 PAIN IN RIGHT HAND: ICD-10-CM

## 2023-02-17 DIAGNOSIS — M79.642 PAIN IN RIGHT HAND: ICD-10-CM

## 2023-02-17 PROCEDURE — 99214 OFFICE O/P EST MOD 30 MIN: CPT | Mod: 25

## 2023-02-17 PROCEDURE — 20550 NJX 1 TENDON SHEATH/LIGAMENT: CPT | Mod: 59,F1

## 2023-02-17 NOTE — PHYSICAL EXAM
[de-identified] : He is well-appearing on examination\par \par Examination of bilateral hands reveals tenderness at the level of the right index finger and right ring finger A1 pulleys with a palpable click on assessment.  There is stiffness at the PIP joints.\par \par Examination of the left hand particularly at the A1 of the [index and middle] reveals tenderness with a palpable click. \par \par

## 2023-02-17 NOTE — ASSESSMENT
[FreeTextEntry1] : ASSESSMENT:\par \par The patient comes in today with multiple worsening complaints of tendinopathy status post failure of injection therapy for the right index and ring fingers and now worsening at the left index and thumb.  We have discussed surgical management for the right hand and injection therapy for the left side.\par [I have diagnosed the patient today with a new diagnosis - This may diminish bodily function for the extremity.] \par \par Injection:\par \par The risks and benefits of a steroid injection were discussed in detail. The risks include but are not limited to: pain, infection, swelling, flare response, bleeding, subcutaneous fat atrophy, skin depigmentation and/or elevation of blood sugar. The risk of incomplete resolution of symptoms, recurrence and additional intervention was reviewed and considered by the patient. \par The patient agreed to proceed and under a sterile prep, I injected 1 unit into 2 cc of a combination of Celestone and Lidocaine into the left index and left thumb A1 pulleys. The patient tolerated the injection well.\par \par The patient was adequately and thoroughly informed of my assessment of their current condition(s). \par \par Surgical Consent \par The patient has a diagnosis of trigger finger tenosynovitis. \par The risks benefits alternatives were discussed with the patient. Shared decision-making was undertaken. The risks discussed included, but were not limited to pain, stiffness, PIP contracture, recurrence, pulley injury, development of Dupuytren's disease, CRPS, need for additional surgery, infection, nerve injury, tissue loss and the standard risks of both anesthesia and surgery etc\par \par \par DISCUSSION:\par 1.  He has elected to proceed with right-sided index and ring finger trigger finger releases\par 2.  I am hopeful that the injection to the left thumb and index will lead to improvement of his tendinopathy\par \par

## 2023-02-17 NOTE — HISTORY OF PRESENT ILLNESS
[FreeTextEntry1] : Duncan returns for follow-up.  He tells me that his right index and ring fingers had improved dramatically with the injections however his symptoms have recurred.  He also complains of left-sided index and thumb stiffness pain and swelling.

## 2023-03-08 ENCOUNTER — OUTPATIENT (OUTPATIENT)
Dept: OUTPATIENT SERVICES | Facility: HOSPITAL | Age: 58
LOS: 1 days | End: 2023-03-08
Payer: MEDICARE

## 2023-03-08 DIAGNOSIS — Z89.022 ACQUIRED ABSENCE OF LEFT FINGER(S): Chronic | ICD-10-CM

## 2023-03-08 DIAGNOSIS — Z98.890 OTHER SPECIFIED POSTPROCEDURAL STATES: Chronic | ICD-10-CM

## 2023-03-08 DIAGNOSIS — S06.9X9A UNSPECIFIED INTRACRANIAL INJURY WITH LOSS OF CONSCIOUSNESS OF UNSPECIFIED DURATION, INITIAL ENCOUNTER: Chronic | ICD-10-CM

## 2023-03-08 DIAGNOSIS — Z01.818 ENCOUNTER FOR OTHER PREPROCEDURAL EXAMINATION: ICD-10-CM

## 2023-03-08 LAB
A1C WITH ESTIMATED AVERAGE GLUCOSE RESULT: 5.8 % — HIGH (ref 4–5.6)
ANION GAP SERPL CALC-SCNC: 13 MMOL/L — SIGNIFICANT CHANGE UP (ref 5–17)
BASOPHILS # BLD AUTO: 0.07 K/UL — SIGNIFICANT CHANGE UP (ref 0–0.2)
BASOPHILS NFR BLD AUTO: 1.2 % — SIGNIFICANT CHANGE UP (ref 0–2)
BUN SERPL-MCNC: 15.6 MG/DL — SIGNIFICANT CHANGE UP (ref 8–20)
CALCIUM SERPL-MCNC: 9.5 MG/DL — SIGNIFICANT CHANGE UP (ref 8.4–10.5)
CHLORIDE SERPL-SCNC: 102 MMOL/L — SIGNIFICANT CHANGE UP (ref 96–108)
CO2 SERPL-SCNC: 26 MMOL/L — SIGNIFICANT CHANGE UP (ref 22–29)
CREAT SERPL-MCNC: 0.76 MG/DL — SIGNIFICANT CHANGE UP (ref 0.5–1.3)
EGFR: 106 ML/MIN/1.73M2 — SIGNIFICANT CHANGE UP
EOSINOPHIL # BLD AUTO: 0.09 K/UL — SIGNIFICANT CHANGE UP (ref 0–0.5)
EOSINOPHIL NFR BLD AUTO: 1.5 % — SIGNIFICANT CHANGE UP (ref 0–6)
ESTIMATED AVERAGE GLUCOSE: 120 MG/DL — HIGH (ref 68–114)
GLUCOSE SERPL-MCNC: 147 MG/DL — HIGH (ref 70–99)
HCT VFR BLD CALC: 46.1 % — SIGNIFICANT CHANGE UP (ref 39–50)
HGB BLD-MCNC: 15 G/DL — SIGNIFICANT CHANGE UP (ref 13–17)
IMM GRANULOCYTES NFR BLD AUTO: 0.5 % — SIGNIFICANT CHANGE UP (ref 0–0.9)
LYMPHOCYTES # BLD AUTO: 1.17 K/UL — SIGNIFICANT CHANGE UP (ref 1–3.3)
LYMPHOCYTES # BLD AUTO: 19.7 % — SIGNIFICANT CHANGE UP (ref 13–44)
MCHC RBC-ENTMCNC: 28.6 PG — SIGNIFICANT CHANGE UP (ref 27–34)
MCHC RBC-ENTMCNC: 32.5 GM/DL — SIGNIFICANT CHANGE UP (ref 32–36)
MCV RBC AUTO: 88 FL — SIGNIFICANT CHANGE UP (ref 80–100)
MONOCYTES # BLD AUTO: 0.54 K/UL — SIGNIFICANT CHANGE UP (ref 0–0.9)
MONOCYTES NFR BLD AUTO: 9.1 % — SIGNIFICANT CHANGE UP (ref 2–14)
NEUTROPHILS # BLD AUTO: 4.04 K/UL — SIGNIFICANT CHANGE UP (ref 1.8–7.4)
NEUTROPHILS NFR BLD AUTO: 68 % — SIGNIFICANT CHANGE UP (ref 43–77)
PLATELET # BLD AUTO: 173 K/UL — SIGNIFICANT CHANGE UP (ref 150–400)
POTASSIUM SERPL-MCNC: 3.7 MMOL/L — SIGNIFICANT CHANGE UP (ref 3.5–5.3)
POTASSIUM SERPL-SCNC: 3.7 MMOL/L — SIGNIFICANT CHANGE UP (ref 3.5–5.3)
RBC # BLD: 5.24 M/UL — SIGNIFICANT CHANGE UP (ref 4.2–5.8)
RBC # FLD: 15.4 % — HIGH (ref 10.3–14.5)
SODIUM SERPL-SCNC: 140 MMOL/L — SIGNIFICANT CHANGE UP (ref 135–145)
WBC # BLD: 5.94 K/UL — SIGNIFICANT CHANGE UP (ref 3.8–10.5)
WBC # FLD AUTO: 5.94 K/UL — SIGNIFICANT CHANGE UP (ref 3.8–10.5)

## 2023-03-08 PROCEDURE — 93010 ELECTROCARDIOGRAM REPORT: CPT

## 2023-03-08 PROCEDURE — G0463: CPT

## 2023-03-08 PROCEDURE — 36415 COLL VENOUS BLD VENIPUNCTURE: CPT

## 2023-03-08 PROCEDURE — 83036 HEMOGLOBIN GLYCOSYLATED A1C: CPT

## 2023-03-08 PROCEDURE — 93005 ELECTROCARDIOGRAM TRACING: CPT

## 2023-03-08 PROCEDURE — 80048 BASIC METABOLIC PNL TOTAL CA: CPT

## 2023-03-08 PROCEDURE — 85025 COMPLETE CBC W/AUTO DIFF WBC: CPT

## 2023-03-22 ENCOUNTER — RX RENEWAL (OUTPATIENT)
Age: 58
End: 2023-03-22

## 2023-03-29 ENCOUNTER — APPOINTMENT (OUTPATIENT)
Dept: FAMILY MEDICINE | Facility: CLINIC | Age: 58
End: 2023-03-29

## 2023-03-29 ENCOUNTER — RX RENEWAL (OUTPATIENT)
Age: 58
End: 2023-03-29

## 2023-03-31 RX ORDER — ACETAMINOPHEN 500 MG
0 TABLET ORAL
Qty: 0 | Refills: 0 | DISCHARGE

## 2023-03-31 RX ORDER — SERTRALINE 25 MG/1
1 TABLET, FILM COATED ORAL
Qty: 0 | Refills: 0 | DISCHARGE

## 2023-03-31 RX ORDER — LORATADINE 10 MG/1
0 TABLET ORAL
Qty: 0 | Refills: 0 | DISCHARGE

## 2023-03-31 RX ORDER — LANOLIN ALCOHOL/MO/W.PET/CERES
1 CREAM (GRAM) TOPICAL
Qty: 0 | Refills: 0 | DISCHARGE

## 2023-03-31 RX ORDER — ATORVASTATIN CALCIUM 80 MG/1
1 TABLET, FILM COATED ORAL
Qty: 0 | Refills: 0 | DISCHARGE

## 2023-03-31 RX ORDER — QUETIAPINE FUMARATE 200 MG/1
1 TABLET, FILM COATED ORAL
Qty: 0 | Refills: 0 | DISCHARGE

## 2023-03-31 RX ORDER — HYDROCHLOROTHIAZIDE 25 MG
1 TABLET ORAL
Qty: 0 | Refills: 0 | DISCHARGE

## 2023-03-31 RX ORDER — TADALAFIL 10 MG/1
1 TABLET, FILM COATED ORAL
Qty: 0 | Refills: 0 | DISCHARGE

## 2023-03-31 RX ORDER — ROPINIROLE 8 MG/1
1 TABLET, FILM COATED, EXTENDED RELEASE ORAL
Qty: 0 | Refills: 0 | DISCHARGE

## 2023-04-04 ENCOUNTER — APPOINTMENT (OUTPATIENT)
Dept: ORTHOPEDIC SURGERY | Facility: CLINIC | Age: 58
End: 2023-04-04

## 2023-04-05 ENCOUNTER — APPOINTMENT (OUTPATIENT)
Dept: ORTHOPEDIC SURGERY | Facility: HOSPITAL | Age: 58
End: 2023-04-05

## 2023-04-10 ENCOUNTER — RX RENEWAL (OUTPATIENT)
Age: 58
End: 2023-04-10

## 2023-04-12 ENCOUNTER — APPOINTMENT (OUTPATIENT)
Dept: FAMILY MEDICINE | Facility: CLINIC | Age: 58
End: 2023-04-12
Payer: MEDICARE

## 2023-04-12 VITALS
BODY MASS INDEX: 31.15 KG/M2 | SYSTOLIC BLOOD PRESSURE: 152 MMHG | DIASTOLIC BLOOD PRESSURE: 82 MMHG | HEART RATE: 84 BPM | TEMPERATURE: 97.5 F | OXYGEN SATURATION: 95 % | WEIGHT: 230 LBS | HEIGHT: 72 IN

## 2023-04-12 DIAGNOSIS — J06.9 ACUTE UPPER RESPIRATORY INFECTION, UNSPECIFIED: ICD-10-CM

## 2023-04-12 DIAGNOSIS — I10 ESSENTIAL (PRIMARY) HYPERTENSION: ICD-10-CM

## 2023-04-12 PROCEDURE — 99214 OFFICE O/P EST MOD 30 MIN: CPT

## 2023-04-12 RX ORDER — ACETAMINOPHEN 500 MG/1
500 TABLET, COATED ORAL EVERY 6 HOURS
Qty: 120 | Refills: 0 | Status: DISCONTINUED | COMMUNITY
Start: 2021-05-17 | End: 2023-04-12

## 2023-04-12 NOTE — HISTORY OF PRESENT ILLNESS
[FreeTextEntry8] : 58 yo male presents with complaint of cough and nasal congestion. He says nasal discharge is yellow/green. In addition, he feels congested. Symptoms have been going on for 2 wks. Denies fever, chills, cp, palpitations, sob, nvcd.\par

## 2023-04-12 NOTE — ASSESSMENT
[FreeTextEntry1] : Acute URI: start doxycycline bid x 7 days, recommend supportive care, start tylenol prn for fever/pain, recommend increased hydration, call EMS if symptoms worsen or develop sob. Recommend hand hygiene, cover mouth when coughing, wash hands frequently\par AMILCAR: refer to pulm\par HTN: bp within acceptable range, c/w current regimen, recommend low salt diet, DASH diet\par RTC 2 wks

## 2023-04-13 ENCOUNTER — APPOINTMENT (OUTPATIENT)
Dept: ORTHOPEDIC SURGERY | Facility: CLINIC | Age: 58
End: 2023-04-13

## 2023-04-24 ENCOUNTER — RX RENEWAL (OUTPATIENT)
Age: 58
End: 2023-04-24

## 2023-04-25 LAB
25(OH)D3 SERPL-MCNC: 35.6 NG/ML
ALBUMIN SERPL ELPH-MCNC: 4.2 G/DL
ALP BLD-CCNC: 71 U/L
ALT SERPL-CCNC: 22 U/L
ANION GAP SERPL CALC-SCNC: 16 MMOL/L
AST SERPL-CCNC: 22 U/L
BASOPHILS # BLD AUTO: 0.07 K/UL
BASOPHILS NFR BLD AUTO: 1.1 %
BILIRUB SERPL-MCNC: 0.3 MG/DL
BUN SERPL-MCNC: 17 MG/DL
CALCIUM SERPL-MCNC: 9.6 MG/DL
CHLORIDE SERPL-SCNC: 102 MMOL/L
CHOLEST SERPL-MCNC: 153 MG/DL
CO2 SERPL-SCNC: 28 MMOL/L
CREAT SERPL-MCNC: 0.81 MG/DL
DEPRECATED D DIMER PPP IA-ACNC: <150 NG/ML DDU
EGFR: 104 ML/MIN/1.73M2
EOSINOPHIL # BLD AUTO: 0.09 K/UL
EOSINOPHIL NFR BLD AUTO: 1.4 %
ESTIMATED AVERAGE GLUCOSE: 120 MG/DL
GLUCOSE SERPL-MCNC: 129 MG/DL
HBA1C MFR BLD HPLC: 5.8 %
HCT VFR BLD CALC: 45.9 %
HDLC SERPL-MCNC: 41 MG/DL
HGB BLD-MCNC: 15.2 G/DL
IMM GRANULOCYTES NFR BLD AUTO: 0.6 %
LDLC SERPL CALC-MCNC: NORMAL MG/DL
LYMPHOCYTES # BLD AUTO: 1.16 K/UL
LYMPHOCYTES NFR BLD AUTO: 17.8 %
MAN DIFF?: NORMAL
MCHC RBC-ENTMCNC: 29.1 PG
MCHC RBC-ENTMCNC: 33.1 GM/DL
MCV RBC AUTO: 87.8 FL
MONOCYTES # BLD AUTO: 0.75 K/UL
MONOCYTES NFR BLD AUTO: 11.5 %
NEUTROPHILS # BLD AUTO: 4.41 K/UL
NEUTROPHILS NFR BLD AUTO: 67.6 %
NONHDLC SERPL-MCNC: 111 MG/DL
PLATELET # BLD AUTO: 189 K/UL
POTASSIUM SERPL-SCNC: 3.4 MMOL/L
PROT SERPL-MCNC: 6.4 G/DL
PSA FREE FLD-MCNC: 26 %
PSA FREE SERPL-MCNC: 0.15 NG/ML
PSA SERPL-MCNC: 0.57 NG/ML
RAPID RVP RESULT: NOT DETECTED
RBC # BLD: 5.23 M/UL
RBC # FLD: 15.5 %
SARS-COV-2 RNA PNL RESP NAA+PROBE: NOT DETECTED
SODIUM SERPL-SCNC: 146 MMOL/L
TRIGL SERPL-MCNC: 437 MG/DL
TSH SERPL-ACNC: 0.75 UIU/ML
WBC # FLD AUTO: 6.52 K/UL

## 2023-04-27 ENCOUNTER — APPOINTMENT (OUTPATIENT)
Dept: PULMONOLOGY | Facility: CLINIC | Age: 58
End: 2023-04-27
Payer: MEDICARE

## 2023-04-27 ENCOUNTER — APPOINTMENT (OUTPATIENT)
Dept: PULMONOLOGY | Facility: CLINIC | Age: 58
End: 2023-04-27

## 2023-04-27 VITALS
RESPIRATION RATE: 16 BRPM | OXYGEN SATURATION: 96 % | BODY MASS INDEX: 31.15 KG/M2 | HEIGHT: 72 IN | DIASTOLIC BLOOD PRESSURE: 80 MMHG | SYSTOLIC BLOOD PRESSURE: 130 MMHG | HEART RATE: 93 BPM | WEIGHT: 230 LBS

## 2023-04-27 DIAGNOSIS — G47.11 IDIOPATHIC HYPERSOMNIA WITH LONG SLEEP TIME: ICD-10-CM

## 2023-04-27 PROCEDURE — 99203 OFFICE O/P NEW LOW 30 MIN: CPT

## 2023-04-27 RX ORDER — ZOLPIDEM TARTRATE 5 MG/1
5 TABLET ORAL AT BEDTIME
Qty: 2 | Refills: 0 | Status: ACTIVE | COMMUNITY
Start: 2023-04-27 | End: 1900-01-01

## 2023-04-27 NOTE — CONSULT LETTER
[Dear  ___] : Dear  [unfilled], [Consult Letter:] : I had the pleasure of evaluating your patient, [unfilled]. [Please see my note below.] : Please see my note below. [Consult Closing:] : Thank you very much for allowing me to participate in the care of this patient.  If you have any questions, please do not hesitate to contact me. [Sincerely,] : Sincerely, [DrHenna  ___] : Dr. PÉREZ

## 2023-05-02 DIAGNOSIS — K59.00 CONSTIPATION, UNSPECIFIED: ICD-10-CM

## 2023-05-02 RX ORDER — MAGNESIUM HYDROXIDE 2400 MG/30ML
2400 SUSPENSION ORAL
Qty: 240 | Refills: 0 | Status: ACTIVE | COMMUNITY
Start: 2023-05-02 | End: 1900-01-01

## 2023-05-02 RX ORDER — DOCUSATE SODIUM 100 MG/1
100 CAPSULE ORAL
Qty: 1 | Refills: 0 | Status: ACTIVE | COMMUNITY
Start: 2023-05-02 | End: 1900-01-01

## 2023-05-08 ENCOUNTER — APPOINTMENT (OUTPATIENT)
Dept: OTOLARYNGOLOGY | Facility: CLINIC | Age: 58
End: 2023-05-08

## 2023-05-15 ENCOUNTER — EMERGENCY (EMERGENCY)
Facility: HOSPITAL | Age: 58
LOS: 1 days | Discharge: HOME | End: 2023-05-15
Attending: STUDENT IN AN ORGANIZED HEALTH CARE EDUCATION/TRAINING PROGRAM
Payer: MEDICARE

## 2023-05-15 VITALS
RESPIRATION RATE: 18 BRPM | TEMPERATURE: 98 F | SYSTOLIC BLOOD PRESSURE: 95 MMHG | DIASTOLIC BLOOD PRESSURE: 57 MMHG | HEART RATE: 95 BPM | OXYGEN SATURATION: 99 %

## 2023-05-15 DIAGNOSIS — S06.9X9A UNSPECIFIED INTRACRANIAL INJURY WITH LOSS OF CONSCIOUSNESS OF UNSPECIFIED DURATION, INITIAL ENCOUNTER: Chronic | ICD-10-CM

## 2023-05-15 DIAGNOSIS — Z98.890 OTHER SPECIFIED POSTPROCEDURAL STATES: Chronic | ICD-10-CM

## 2023-05-15 DIAGNOSIS — Z89.022 ACQUIRED ABSENCE OF LEFT FINGER(S): Chronic | ICD-10-CM

## 2023-05-15 PROCEDURE — 99284 EMERGENCY DEPT VISIT MOD MDM: CPT

## 2023-05-15 NOTE — ED ADULT TRIAGE NOTE - CHIEF COMPLAINT QUOTE
Brought in by EMS c/o "uncontrollable body twitching for upper arms" 1 hr prior to arrival. hx of paraplegia from TBI and spasticity. admits to drinking alcohol and marijuana yesterday, denies any today. gcs15. power chair.

## 2023-05-16 VITALS
TEMPERATURE: 98 F | SYSTOLIC BLOOD PRESSURE: 113 MMHG | RESPIRATION RATE: 18 BRPM | OXYGEN SATURATION: 96 % | HEART RATE: 82 BPM | DIASTOLIC BLOOD PRESSURE: 86 MMHG

## 2023-05-16 LAB
ALBUMIN SERPL ELPH-MCNC: 4.7 G/DL — SIGNIFICANT CHANGE UP (ref 3.3–5.2)
ALP SERPL-CCNC: 72 U/L — SIGNIFICANT CHANGE UP (ref 40–120)
ALT FLD-CCNC: 26 U/L — SIGNIFICANT CHANGE UP
ANION GAP SERPL CALC-SCNC: 11 MMOL/L — SIGNIFICANT CHANGE UP (ref 5–17)
AST SERPL-CCNC: 26 U/L — SIGNIFICANT CHANGE UP
BASE EXCESS BLDV CALC-SCNC: 4.2 MMOL/L — HIGH (ref -2–3)
BASOPHILS # BLD AUTO: 0.08 K/UL — SIGNIFICANT CHANGE UP (ref 0–0.2)
BASOPHILS NFR BLD AUTO: 1 % — SIGNIFICANT CHANGE UP (ref 0–2)
BILIRUB SERPL-MCNC: 0.4 MG/DL — SIGNIFICANT CHANGE UP (ref 0.4–2)
BUN SERPL-MCNC: 13.5 MG/DL — SIGNIFICANT CHANGE UP (ref 8–20)
CA-I SERPL-SCNC: 1.17 MMOL/L — SIGNIFICANT CHANGE UP (ref 1.15–1.33)
CALCIUM SERPL-MCNC: 9.5 MG/DL — SIGNIFICANT CHANGE UP (ref 8.4–10.5)
CHLORIDE BLDV-SCNC: 105 MMOL/L — SIGNIFICANT CHANGE UP (ref 96–108)
CHLORIDE SERPL-SCNC: 100 MMOL/L — SIGNIFICANT CHANGE UP (ref 96–108)
CO2 SERPL-SCNC: 28 MMOL/L — SIGNIFICANT CHANGE UP (ref 22–29)
CREAT SERPL-MCNC: 0.7 MG/DL — SIGNIFICANT CHANGE UP (ref 0.5–1.3)
EGFR: 109 ML/MIN/1.73M2 — SIGNIFICANT CHANGE UP
EOSINOPHIL # BLD AUTO: 0.08 K/UL — SIGNIFICANT CHANGE UP (ref 0–0.5)
EOSINOPHIL NFR BLD AUTO: 1 % — SIGNIFICANT CHANGE UP (ref 0–6)
GAS PNL BLDV: 137 MMOL/L — SIGNIFICANT CHANGE UP (ref 136–145)
GAS PNL BLDV: SIGNIFICANT CHANGE UP
GLUCOSE BLDV-MCNC: 123 MG/DL — HIGH (ref 70–99)
GLUCOSE SERPL-MCNC: 122 MG/DL — HIGH (ref 70–99)
HCO3 BLDV-SCNC: 30 MMOL/L — HIGH (ref 22–29)
HCT VFR BLD CALC: 45.5 % — SIGNIFICANT CHANGE UP (ref 39–50)
HCT VFR BLDA CALC: 47 % — SIGNIFICANT CHANGE UP
HGB BLD CALC-MCNC: 15.7 G/DL — SIGNIFICANT CHANGE UP (ref 12.6–17.4)
HGB BLD-MCNC: 15.3 G/DL — SIGNIFICANT CHANGE UP (ref 13–17)
IMM GRANULOCYTES NFR BLD AUTO: 0.7 % — SIGNIFICANT CHANGE UP (ref 0–0.9)
LACTATE BLDV-MCNC: 1 MMOL/L — SIGNIFICANT CHANGE UP (ref 0.5–2)
LYMPHOCYTES # BLD AUTO: 1.29 K/UL — SIGNIFICANT CHANGE UP (ref 1–3.3)
LYMPHOCYTES # BLD AUTO: 15.4 % — SIGNIFICANT CHANGE UP (ref 13–44)
MAGNESIUM SERPL-MCNC: 1.9 MG/DL — SIGNIFICANT CHANGE UP (ref 1.6–2.6)
MCHC RBC-ENTMCNC: 29.5 PG — SIGNIFICANT CHANGE UP (ref 27–34)
MCHC RBC-ENTMCNC: 33.6 GM/DL — SIGNIFICANT CHANGE UP (ref 32–36)
MCV RBC AUTO: 87.7 FL — SIGNIFICANT CHANGE UP (ref 80–100)
MONOCYTES # BLD AUTO: 0.67 K/UL — SIGNIFICANT CHANGE UP (ref 0–0.9)
MONOCYTES NFR BLD AUTO: 8 % — SIGNIFICANT CHANGE UP (ref 2–14)
NEUTROPHILS # BLD AUTO: 6.2 K/UL — SIGNIFICANT CHANGE UP (ref 1.8–7.4)
NEUTROPHILS NFR BLD AUTO: 73.9 % — SIGNIFICANT CHANGE UP (ref 43–77)
PCO2 BLDV: 51 MMHG — SIGNIFICANT CHANGE UP (ref 42–55)
PH BLDV: 7.37 — SIGNIFICANT CHANGE UP (ref 7.32–7.43)
PLATELET # BLD AUTO: 223 K/UL — SIGNIFICANT CHANGE UP (ref 150–400)
PO2 BLDV: 51 MMHG — HIGH (ref 25–45)
POTASSIUM BLDV-SCNC: 3.5 MMOL/L — SIGNIFICANT CHANGE UP (ref 3.5–5.1)
POTASSIUM SERPL-MCNC: 3.6 MMOL/L — SIGNIFICANT CHANGE UP (ref 3.5–5.3)
POTASSIUM SERPL-SCNC: 3.6 MMOL/L — SIGNIFICANT CHANGE UP (ref 3.5–5.3)
PROT SERPL-MCNC: 6.8 G/DL — SIGNIFICANT CHANGE UP (ref 6.6–8.7)
RBC # BLD: 5.19 M/UL — SIGNIFICANT CHANGE UP (ref 4.2–5.8)
RBC # FLD: 15.8 % — HIGH (ref 10.3–14.5)
SAO2 % BLDV: 84.7 % — SIGNIFICANT CHANGE UP
SODIUM SERPL-SCNC: 139 MMOL/L — SIGNIFICANT CHANGE UP (ref 135–145)
WBC # BLD: 8.38 K/UL — SIGNIFICANT CHANGE UP (ref 3.8–10.5)
WBC # FLD AUTO: 8.38 K/UL — SIGNIFICANT CHANGE UP (ref 3.8–10.5)

## 2023-05-16 PROCEDURE — 82947 ASSAY GLUCOSE BLOOD QUANT: CPT

## 2023-05-16 PROCEDURE — 82435 ASSAY OF BLOOD CHLORIDE: CPT

## 2023-05-16 PROCEDURE — 82330 ASSAY OF CALCIUM: CPT

## 2023-05-16 PROCEDURE — 82803 BLOOD GASES ANY COMBINATION: CPT

## 2023-05-16 PROCEDURE — 84295 ASSAY OF SERUM SODIUM: CPT

## 2023-05-16 PROCEDURE — 84132 ASSAY OF SERUM POTASSIUM: CPT

## 2023-05-16 PROCEDURE — 80053 COMPREHEN METABOLIC PANEL: CPT

## 2023-05-16 PROCEDURE — 99284 EMERGENCY DEPT VISIT MOD MDM: CPT

## 2023-05-16 PROCEDURE — 85014 HEMATOCRIT: CPT

## 2023-05-16 PROCEDURE — 83605 ASSAY OF LACTIC ACID: CPT

## 2023-05-16 PROCEDURE — 36415 COLL VENOUS BLD VENIPUNCTURE: CPT

## 2023-05-16 PROCEDURE — 85018 HEMOGLOBIN: CPT

## 2023-05-16 PROCEDURE — 83735 ASSAY OF MAGNESIUM: CPT

## 2023-05-16 PROCEDURE — 70450 CT HEAD/BRAIN W/O DYE: CPT | Mod: MA

## 2023-05-16 PROCEDURE — 85025 COMPLETE CBC W/AUTO DIFF WBC: CPT

## 2023-05-16 PROCEDURE — 70450 CT HEAD/BRAIN W/O DYE: CPT | Mod: 26,MA

## 2023-05-16 RX ORDER — SODIUM CHLORIDE 9 MG/ML
1000 INJECTION INTRAMUSCULAR; INTRAVENOUS; SUBCUTANEOUS ONCE
Refills: 0 | Status: COMPLETED | OUTPATIENT
Start: 2023-05-16 | End: 2023-05-16

## 2023-05-16 RX ORDER — ACETAMINOPHEN 500 MG
650 TABLET ORAL ONCE
Refills: 0 | Status: COMPLETED | OUTPATIENT
Start: 2023-05-16 | End: 2023-05-16

## 2023-05-16 RX ADMIN — Medication 650 MILLIGRAM(S): at 07:49

## 2023-05-16 RX ADMIN — SODIUM CHLORIDE 1000 MILLILITER(S): 9 INJECTION INTRAMUSCULAR; INTRAVENOUS; SUBCUTANEOUS at 01:42

## 2023-05-16 NOTE — ED PROVIDER NOTE - PATIENT PORTAL LINK FT
You can access the FollowMyHealth Patient Portal offered by [facility] by registering at the following website: [email or web address]. By joining [employer]’s FollowMyHealth portal, you will also be able to view your health information using other applications (apps) compatible with our system.

## 2023-05-16 NOTE — ED ADULT NURSE NOTE - NSFALLRISKINTERV_ED_ALL_ED

## 2023-05-16 NOTE — ED PROVIDER NOTE - OBJECTIVE STATEMENT
57-year-old male with history of TBI and hypertension presenting  status post diffuse body spasm that occurred tonight while at rest.  Patient states he has been generally well without and these sick symptoms, change in medication, change in diet, new supplements, new activities.  Patient states while at rest he developed spasms sensation in his fingers hands arms radiate to his chest down his abdomen and into his legs.  Symptoms lasted for a few minutes prompting call to 911 since patient lives alone and 8 is not supposed to see the patient to later today.  Patient gradually had symptoms resolved and is resting comfortably at this time.  Patient states he does not know his previous episodes

## 2023-05-16 NOTE — ED PROVIDER NOTE - CLINICAL SUMMARY MEDICAL DECISION MAKING FREE TEXT BOX
Patient with episode of spasm prompting presentation to the ED. No acute findings at this time. Patient to f/u with PMD.

## 2023-05-16 NOTE — ED PROVIDER NOTE - CROS ED [facility] ALL NEG
Referral order placed. Call placed to OBGYN to schedule patient in 1-2 weeks, they will call patient with appointment time.    negative...
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
## 2023-05-18 DIAGNOSIS — G47.33 OBSTRUCTIVE SLEEP APNEA (ADULT) (PEDIATRIC): ICD-10-CM

## 2023-05-18 DIAGNOSIS — Z79.899 OTHER LONG TERM (CURRENT) DRUG THERAPY: ICD-10-CM

## 2023-05-18 DIAGNOSIS — F32.A DEPRESSION, UNSPECIFIED: ICD-10-CM

## 2023-05-18 DIAGNOSIS — M62.838 OTHER MUSCLE SPASM: ICD-10-CM

## 2023-05-18 DIAGNOSIS — R25.2 CRAMP AND SPASM: ICD-10-CM

## 2023-05-18 DIAGNOSIS — Z99.89 DEPENDENCE ON OTHER ENABLING MACHINES AND DEVICES: ICD-10-CM

## 2023-05-18 DIAGNOSIS — I10 ESSENTIAL (PRIMARY) HYPERTENSION: ICD-10-CM

## 2023-05-24 ENCOUNTER — RX RENEWAL (OUTPATIENT)
Age: 58
End: 2023-05-24

## 2023-05-24 ENCOUNTER — APPOINTMENT (OUTPATIENT)
Dept: ORTHOPEDIC SURGERY | Facility: CLINIC | Age: 58
End: 2023-05-24

## 2023-06-07 NOTE — DISCUSSION/SUMMARY
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
## 2023-06-08 ENCOUNTER — APPOINTMENT (OUTPATIENT)
Dept: PULMONOLOGY | Facility: CLINIC | Age: 58
End: 2023-06-08

## 2023-06-12 ENCOUNTER — APPOINTMENT (OUTPATIENT)
Dept: OTOLARYNGOLOGY | Facility: CLINIC | Age: 58
End: 2023-06-12

## 2023-06-21 ENCOUNTER — RX RENEWAL (OUTPATIENT)
Age: 58
End: 2023-06-21

## 2023-06-23 ENCOUNTER — RX RENEWAL (OUTPATIENT)
Age: 58
End: 2023-06-23

## 2023-07-06 ENCOUNTER — RX RENEWAL (OUTPATIENT)
Age: 58
End: 2023-07-06

## 2023-07-10 ENCOUNTER — RX RENEWAL (OUTPATIENT)
Age: 58
End: 2023-07-10

## 2023-07-18 RX ORDER — CHLORHEXIDINE GLUCONATE 4 %
5 LIQUID (ML) TOPICAL
Qty: 30 | Refills: 0 | Status: ACTIVE | COMMUNITY
Start: 2021-10-20

## 2023-07-19 ENCOUNTER — RX RENEWAL (OUTPATIENT)
Age: 58
End: 2023-07-19

## 2023-07-31 ENCOUNTER — APPOINTMENT (OUTPATIENT)
Dept: UROLOGY | Facility: CLINIC | Age: 58
End: 2023-07-31

## 2023-08-07 ENCOUNTER — APPOINTMENT (OUTPATIENT)
Dept: UROLOGY | Facility: CLINIC | Age: 58
End: 2023-08-07
Payer: MEDICARE

## 2023-08-07 DIAGNOSIS — R39.15 URGENCY OF URINATION: ICD-10-CM

## 2023-08-07 LAB
BILIRUB UR QL STRIP: NORMAL
CLARITY UR: CLEAR
COLLECTION METHOD: NORMAL
GLUCOSE UR-MCNC: NORMAL
HCG UR QL: 1 EU/DL
HGB UR QL STRIP.AUTO: ABNORMAL
KETONES UR-MCNC: NORMAL
LEUKOCYTE ESTERASE UR QL STRIP: ABNORMAL
NITRITE UR QL STRIP: NORMAL
PH UR STRIP: 7
PROT UR STRIP-MCNC: ABNORMAL
SP GR UR STRIP: 1.02

## 2023-08-07 PROCEDURE — 81003 URINALYSIS AUTO W/O SCOPE: CPT | Mod: QW

## 2023-08-07 PROCEDURE — 99213 OFFICE O/P EST LOW 20 MIN: CPT

## 2023-08-07 RX ORDER — DOXYCYCLINE 100 MG/1
100 TABLET, FILM COATED ORAL
Qty: 14 | Refills: 0 | Status: DISCONTINUED | COMMUNITY
Start: 2023-04-12 | End: 2023-08-07

## 2023-08-07 RX ORDER — QUETIAPINE FUMARATE 50 MG/1
50 TABLET ORAL
Qty: 90 | Refills: 0 | Status: ACTIVE | COMMUNITY
Start: 2023-01-12

## 2023-08-07 RX ORDER — FLUTICASONE PROPIONATE 50 UG/1
50 SPRAY, METERED NASAL TWICE DAILY
Qty: 1 | Refills: 1 | Status: DISCONTINUED | COMMUNITY
Start: 2023-04-12 | End: 2023-08-07

## 2023-08-07 RX ORDER — COVID-19 MOLECULAR TEST ASSAY
KIT MISCELLANEOUS
Qty: 4 | Refills: 0 | Status: DISCONTINUED | COMMUNITY
Start: 2022-07-11 | End: 2023-08-07

## 2023-08-07 RX ORDER — BACLOFEN 10 MG/20ML
10 INJECTION INTRATHECAL
Qty: 20 | Refills: 0 | Status: ACTIVE | COMMUNITY
Start: 2023-05-03

## 2023-08-07 NOTE — LETTER BODY
[Dear  ___] : Dear  [unfilled], [Courtesy Letter:] : I had the pleasure of seeing your patient, [unfilled], in my office today. [Please see my note below.] : Please see my note below. [Sincerely,] : Sincerely, [FreeTextEntry3] : Ed  Abraham Lee MD [facility]  of Urology [employer] at [facility]

## 2023-08-07 NOTE — HISTORY OF PRESENT ILLNESS
[FreeTextEntry1] : The patient has been having significant urgency rare incontinence. no dysuria or hematuria. no nocturia.   No fevers or chills.

## 2023-08-14 ENCOUNTER — RX RENEWAL (OUTPATIENT)
Age: 58
End: 2023-08-14

## 2023-08-14 DIAGNOSIS — N39.0 URINARY TRACT INFECTION, SITE NOT SPECIFIED: ICD-10-CM

## 2023-08-14 LAB — BACTERIA UR CULT: ABNORMAL

## 2023-08-14 RX ORDER — AMOXICILLIN AND CLAVULANATE POTASSIUM 875; 125 MG/1; MG/1
875-125 TABLET, COATED ORAL
Qty: 10 | Refills: 0 | Status: ACTIVE | COMMUNITY
Start: 2023-08-14 | End: 1900-01-01

## 2023-08-14 RX ORDER — NITROFURANTOIN (MONOHYDRATE/MACROCRYSTALS) 25; 75 MG/1; MG/1
100 CAPSULE ORAL
Qty: 10 | Refills: 0 | Status: ACTIVE | COMMUNITY
Start: 2023-08-14 | End: 1900-01-01

## 2023-08-24 ENCOUNTER — RX RENEWAL (OUTPATIENT)
Age: 58
End: 2023-08-24

## 2023-09-18 ENCOUNTER — RX RENEWAL (OUTPATIENT)
Age: 58
End: 2023-09-18

## 2023-09-19 ENCOUNTER — RX RENEWAL (OUTPATIENT)
Age: 58
End: 2023-09-19

## 2023-09-19 RX ORDER — MAGNESIUM HYDROXIDE 400 MG/5ML
400 SUSPENSION ORAL
Qty: 240 | Refills: 0 | Status: ACTIVE | COMMUNITY
Start: 2023-09-19 | End: 1900-01-01

## 2023-10-02 ENCOUNTER — RX RENEWAL (OUTPATIENT)
Age: 58
End: 2023-10-02

## 2023-10-03 ENCOUNTER — RX RENEWAL (OUTPATIENT)
Age: 58
End: 2023-10-03

## 2023-10-08 ENCOUNTER — RX RENEWAL (OUTPATIENT)
Age: 58
End: 2023-10-08

## 2023-10-12 ENCOUNTER — RX RENEWAL (OUTPATIENT)
Age: 58
End: 2023-10-12

## 2023-10-17 RX ORDER — ACETAMINOPHEN 500 MG/1
500 TABLET ORAL
Qty: 180 | Refills: 0 | Status: ACTIVE | COMMUNITY
Start: 2023-04-12 | End: 1900-01-01

## 2023-10-23 ENCOUNTER — RX RENEWAL (OUTPATIENT)
Age: 58
End: 2023-10-23

## 2023-10-26 ENCOUNTER — RX RENEWAL (OUTPATIENT)
Age: 58
End: 2023-10-26

## 2023-11-02 ENCOUNTER — APPOINTMENT (OUTPATIENT)
Dept: FAMILY MEDICINE | Facility: CLINIC | Age: 58
End: 2023-11-02

## 2023-11-10 ENCOUNTER — RX RENEWAL (OUTPATIENT)
Age: 58
End: 2023-11-10

## 2023-11-17 ENCOUNTER — RX RENEWAL (OUTPATIENT)
Age: 58
End: 2023-11-17

## 2023-11-21 ENCOUNTER — APPOINTMENT (OUTPATIENT)
Dept: FAMILY MEDICINE | Facility: CLINIC | Age: 58
End: 2023-11-21

## 2023-12-01 ENCOUNTER — APPOINTMENT (OUTPATIENT)
Dept: PULMONOLOGY | Facility: CLINIC | Age: 58
End: 2023-12-01

## 2023-12-01 DIAGNOSIS — G47.33 OBSTRUCTIVE SLEEP APNEA (ADULT) (PEDIATRIC): ICD-10-CM

## 2023-12-03 ENCOUNTER — EMERGENCY (EMERGENCY)
Facility: HOSPITAL | Age: 58
LOS: 1 days | Discharge: HOME | End: 2023-12-03
Attending: EMERGENCY MEDICINE
Payer: MEDICARE

## 2023-12-03 VITALS
SYSTOLIC BLOOD PRESSURE: 150 MMHG | OXYGEN SATURATION: 98 % | HEART RATE: 78 BPM | TEMPERATURE: 99 F | RESPIRATION RATE: 20 BRPM | DIASTOLIC BLOOD PRESSURE: 78 MMHG

## 2023-12-03 VITALS
OXYGEN SATURATION: 94 % | HEART RATE: 83 BPM | RESPIRATION RATE: 18 BRPM | DIASTOLIC BLOOD PRESSURE: 88 MMHG | SYSTOLIC BLOOD PRESSURE: 148 MMHG | TEMPERATURE: 98 F

## 2023-12-03 DIAGNOSIS — Z98.890 OTHER SPECIFIED POSTPROCEDURAL STATES: Chronic | ICD-10-CM

## 2023-12-03 DIAGNOSIS — S06.9X9A UNSPECIFIED INTRACRANIAL INJURY WITH LOSS OF CONSCIOUSNESS OF UNSPECIFIED DURATION, INITIAL ENCOUNTER: Chronic | ICD-10-CM

## 2023-12-03 DIAGNOSIS — Z89.022 ACQUIRED ABSENCE OF LEFT FINGER(S): Chronic | ICD-10-CM

## 2023-12-03 PROCEDURE — 99284 EMERGENCY DEPT VISIT MOD MDM: CPT

## 2023-12-03 PROCEDURE — 73600 X-RAY EXAM OF ANKLE: CPT

## 2023-12-03 PROCEDURE — 73600 X-RAY EXAM OF ANKLE: CPT | Mod: 26,RT

## 2023-12-03 RX ORDER — IBUPROFEN 200 MG
600 TABLET ORAL ONCE
Refills: 0 | Status: COMPLETED | OUTPATIENT
Start: 2023-12-03 | End: 2023-12-03

## 2023-12-03 RX ADMIN — Medication 600 MILLIGRAM(S): at 07:41

## 2023-12-03 NOTE — ED PROVIDER NOTE - NS ED ROS FT
Review of Systems:  	•	CONSTITUTIONAL - no fever or chills. No weight loss  	•	EYES - no eye pain, no blurred vision  	•	ENT - no sore throat, no rhinorrhea   	•	RESPIRATORY - no shortness of breath, no cough  	•	CARDIAC - no chest pain, no palpitations  	•	GI - no abd pain, no nausea, no vomiting, no diarrhea, no constipation, no bleeding  	•	GENITO-URINARY - No dysuria, frequency, or hematuria  	•	MUSCULOSKELETAL - PAIN  	•	NEUROLOGIC - WEAKNESS, CHRONIC   	•	SKIN - No abrasions, no lacerations, no rashes  	•	PSYCH - no anxiety, non suicidal, non homicidal, no hallucination, no depression

## 2023-12-03 NOTE — ED ADULT NURSE NOTE - NSFALLRISKINTERV_ED_ALL_ED
Assistance OOB with selected safe patient handling equipment if applicable/Assistance with ambulation/Communicate fall risk and risk factors to all staff, patient, and family/Monitor gait and stability/Provide patient with walking aids/Provide visual cue: yellow wristband, yellow gown, etc/Reinforce activity limits and safety measures with patient and family/Call bell, personal items and telephone in reach/Instruct patient to call for assistance before getting out of bed/chair/stretcher/Non-slip footwear applied when patient is off stretcher/[place] to call system/Physically safe environment - no spills, clutter or unnecessary equipment/Purposeful Proactive Rounding/Room/bathroom lighting operational, light cord in reach Assistance OOB with selected safe patient handling equipment if applicable/Assistance with ambulation/Communicate fall risk and risk factors to all staff, patient, and family/Monitor gait and stability/Provide patient with walking aids/Provide visual cue: yellow wristband, yellow gown, etc/Reinforce activity limits and safety measures with patient and family/Call bell, personal items and telephone in reach/Instruct patient to call for assistance before getting out of bed/chair/stretcher/Non-slip footwear applied when patient is off stretcher/[place] to call system/Physically safe environment - no spills, clutter or unnecessary equipment/Purposeful Proactive Rounding/Room/bathroom lighting operational, light cord in reach Assistance OOB with selected safe patient handling equipment if applicable/Assistance with ambulation/Communicate fall risk and risk factors to all staff, patient, and family/Monitor gait and stability/Provide patient with walking aids/Provide visual cue: yellow wristband, yellow gown, etc/Reinforce activity limits and safety measures with patient and family/Call bell, personal items and telephone in reach/Instruct patient to call for assistance before getting out of bed/chair/stretcher/Non-slip footwear applied when patient is off stretcher/[place] to call system/Physically safe environment - no spills, clutter or unnecessary equipment/Purposeful Proactive Rounding/Room/bathroom lighting operational, light cord in reach
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
## 2023-12-03 NOTE — ED PROVIDER NOTE - CLINICAL SUMMARY MEDICAL DECISION MAKING FREE TEXT BOX
58-year-old male patient presents the ED complaining of ingestion.  EMS was called for ingestion of alcohol which patient denies.  Patient is clinically sober, GCS 15, neurologically intact.  Complaining of mild left ankle pain atraumatic.  patient states that he feels safe at home, denies any other complaints, no suicidal or homicidal ideations, no illicit drug use.  Will give patient Motrin for ankle pain, safe for discharge home with ambulate.  Patient educated on alcohol use, with patient complying to education 58-year-old male patient presents the ED complaining of ingestion.  EMS was called for ingestion of alcohol which patient denies.  Patient is clinically sober, GCS 15, neurologically intact.  Complaining of mild left ankle pain atraumatic.  patient states that he feels safe at home, denies any other complaints, no suicidal or homicidal ideations, no illicit drug use.  Will give patient Motrin for ankle pain, xray to r/o fx, if negative safe for discharge home with ambulance ride home. Patient educated on alcohol use, safety and fall education, with patient complying to education

## 2023-12-03 NOTE — ED PROVIDER NOTE - ATTENDING CONTRIBUTION TO CARE
58-year-old male patient with a history of TBI, paraplegia, presents to the ED complaining of ingestion.  pt with fall hurting rt ankle pain; pt denies head or neck injury; pe awake alert heent ncat neck supple cor s1s2 lungs clear abd soft nontender neuro nonfocal dx ankle pain
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
## 2023-12-03 NOTE — ED ADULT TRIAGE NOTE - CHIEF COMPLAINT QUOTE
patient BIBA from home, admits to drinking alcohol tonight. per EMS he hit life alert button multiple times tonight. hx paraplegia, has wheelchair at home. arrives to ED awake and alert, cooperative, denying any medical complaints.

## 2023-12-03 NOTE — ED ADULT NURSE NOTE - OBJECTIVE STATEMENT
pt presents to ed after using life alert, pt unsure why he wanted to be brought to ed. pt provides no complaints at this time. pt admits to alcohol use, calm/cooperative. pt hx paraplegic

## 2023-12-03 NOTE — ED PROVIDER NOTE - PATIENT PORTAL LINK FT
You can access the FollowMyHealth Patient Portal offered by [facility] by registering at the following website: [email or web address]. By joining [employer]’s FollowMyHealth portal, you will also be able to view your health information using other applications (apps) compatible with our system. You can access the FollowMyHealth Patient Portal offered by [facility] by registering at the following website: [email or web address]. By joining [employer]’s FollowMyHealth portal, you will also be able to view your health information using other applications (apps) compatible with our system. You can access the FollowMyHealth Patient Portal offered by [facility] by registering at the following website: [email or web address]. By joining [employer]’s FollowMyHealth portal, you will also be able to view your health information using other applications (apps) compatible with our system. You can access the FollowMyHealth Patient Portal offered by [facility] by registering at the following website: [email or web address]. By joining [employer]’s FollowMyHealth portal, you will also be able to view your health information using other applications (apps) compatible with our system. You can access the FollowMyHealth Patient Portal offered by [facility] by registering at the following website: [email or web address]. By joining [employer]’s FollowMyHealth portal, you will also be able to view your health information using other applications (apps) compatible with our system. You can access the FollowMyHealth Patient Portal offered by [facility] by registering at the following website: [email or web address]. By joining [employer]’s FollowMyHealth portal, you will also be able to view your health information using other applications (apps) compatible with our system.
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
## 2023-12-03 NOTE — ED PROVIDER NOTE - PHYSICAL EXAMINATION
Const: Pt is well appearing. Awake, alert and oriented to person, place, & time. In no acute distress.   HEENT: NC/AT. Moist mucous membranes.  Eyes: PERRLA. No scleral icterus. EOMI.  Neck:. Soft and supple. Full ROM without pain. No cervical midline tenderness.   Cardiac: Regular rate and regular rhythm. +S1/S2. Peripheral pulses 2+ and symmetric. No LE edema.  Resp: Speaking in full sentences, breath sounds equal and clear bilaterally. No wheezes, rales or rhonchi.  Abd: Soft, non-tender, non-distended. Normal bowel sounds in all 4 quadrants. No guarding or rebound.  MSK:  no ankle tenderness bilaterally.  Mild edema to bilateral lower extremities, chronic.  PT and DP pulses 2+ bilaterally.  no external signs of trauma  Skin: No rashes, abrasions or lacerations.  Neuro:  paraplegia, full range of motion of bilateral upper extremities.  Cranial nerves II through XII intact

## 2023-12-03 NOTE — ED PROVIDER NOTE - NSFOLLOWUPINSTRUCTIONS_ED_ALL_ED_FT
-Please follow-up with your primary care doctor in the next 2 days.  Please call tomorrow for an appointment.  If you cannot follow-up with your primary care doctor please return to the ED for any urgent issues.  - If you have any worsening of symptoms or any other concerns please return to the ED immediately.  - Please continue taking your home medications as directed.

## 2023-12-07 ENCOUNTER — EMERGENCY (EMERGENCY)
Facility: HOSPITAL | Age: 58
LOS: 1 days | Discharge: HOME | End: 2023-12-07
Attending: STUDENT IN AN ORGANIZED HEALTH CARE EDUCATION/TRAINING PROGRAM
Payer: MEDICARE

## 2023-12-07 VITALS
TEMPERATURE: 98 F | SYSTOLIC BLOOD PRESSURE: 129 MMHG | DIASTOLIC BLOOD PRESSURE: 91 MMHG | RESPIRATION RATE: 18 BRPM | HEART RATE: 104 BPM | OXYGEN SATURATION: 100 %

## 2023-12-07 DIAGNOSIS — Z89.022 ACQUIRED ABSENCE OF LEFT FINGER(S): Chronic | ICD-10-CM

## 2023-12-07 DIAGNOSIS — Z98.890 OTHER SPECIFIED POSTPROCEDURAL STATES: Chronic | ICD-10-CM

## 2023-12-07 DIAGNOSIS — S06.9X9A UNSPECIFIED INTRACRANIAL INJURY WITH LOSS OF CONSCIOUSNESS OF UNSPECIFIED DURATION, INITIAL ENCOUNTER: Chronic | ICD-10-CM

## 2023-12-07 PROCEDURE — 70450 CT HEAD/BRAIN W/O DYE: CPT | Mod: MA

## 2023-12-07 PROCEDURE — 72125 CT NECK SPINE W/O DYE: CPT | Mod: 26,MA

## 2023-12-07 PROCEDURE — 72125 CT NECK SPINE W/O DYE: CPT | Mod: MA

## 2023-12-07 PROCEDURE — 70450 CT HEAD/BRAIN W/O DYE: CPT | Mod: 26,MA

## 2023-12-07 PROCEDURE — 99284 EMERGENCY DEPT VISIT MOD MDM: CPT | Mod: 25

## 2023-12-07 PROCEDURE — 99284 EMERGENCY DEPT VISIT MOD MDM: CPT

## 2023-12-07 NOTE — ED ADULT TRIAGE NOTE - CHIEF COMPLAINT QUOTE
Pt BIBEMS from home for having a fall out of his bed and was stuck between night stand, pt is a parapelegic wheel chair bound and denies hitting head or LOC. Pt offers no complaints.
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
## 2023-12-07 NOTE — ED PROVIDER NOTE - NSCAREINITIATED _GEN_ER
"Anesthesia Transfer of Care Note    Patient: Marleny Guzman    Procedure(s) Performed: Procedure(s) (LRB):  COLONOSCOPY (N/A)    Patient location: [facility]    Anesthesia Type: general    Transport from OR: Transported from OR on 6-10 L/min O2 by face mask with adequate spontaneous ventilation    Post pain: adequate analgesia    Post assessment: no apparent anesthetic complications    Post vital signs: stable    Level of consciousness: awake    Nausea/Vomiting: no nausea/vomiting    Complications: none    Transfer of care protocol was followed      Last vitals:   Visit Vitals  /65 (BP Location: Left arm, Patient Position: Lying)   Pulse 62   Temp 36.6 °C (97.9 °F) (Temporal)   Resp 18   Ht 5' 10" (1.778 m)   Wt 90.3 kg (199 lb)   SpO2 99%   Breastfeeding No   BMI 28.55 kg/m²     "
Jonny Banks(Resident)

## 2023-12-07 NOTE — ED PROVIDER NOTE - ATTENDING CONTRIBUTION TO CARE
Pt with fall out of bed this morning. Pt with no complaints. unable to get up so pressed life alert button.      physical - rrr. ctab. abd - soft, nt. no edema. no rash.    plan - CTs neg. Pt feels well and wants to go home. will d/c with return and f/up instructions.

## 2023-12-07 NOTE — ED ADULT NURSE NOTE - OBJECTIVE STATEMENT
patient reports that he fell from his bed, denies head strike, loc or any other complaints at this time. patient states that ems brought him to the hospital because they felt he would not be able to take care of himself, pt paraplegic.

## 2023-12-07 NOTE — ED PROVIDER NOTE - CLINICAL SUMMARY MEDICAL DECISION MAKING FREE TEXT BOX
Pt is a 59 yo male with PMH of paraplegia s/p TBI, HLD presenting with fall.  This morning, pt was at home when he slipped from his bed onto the floor. Pt pressed the Alert button and EMS brought the pt here. Pt denies any pain, weakness, LOC, headache, head trauma. Denies alcohol or recreational drug use.

## 2023-12-07 NOTE — CHART NOTE - NSCHARTNOTEFT_GEN_A_CORE
SWNote: per pt's ED MD (Dr Vogt) pt ready for d/c, immanuel needed . Pt lives alone ,WC bound pt's WC at home . Worker emailed pt's info to SW clerical for immanuel arrangement. NEAF/billing letter given to pt's RN . No other Sw needs reported at this moment.

## 2023-12-07 NOTE — ED PROVIDER NOTE - PATIENT PORTAL LINK FT
You can access the FollowMyHealth Patient Portal offered by [facility] by registering at the following website: [email or web address]. By joining [employer]’s FollowMyHealth portal, you will also be able to view your health information using other applications (apps) compatible with our system. You can access the FollowMyHealth Patient Portal offered by [facility] by registering at the following website: [email or web address]. By joining [employer]’s FollowMyHealth portal, you will also be able to view your health information using other applications (apps) compatible with our system.

## 2023-12-07 NOTE — ED PROVIDER NOTE - MUSCULOSKELETAL, MLM
Spine appears normal, range of motion in BLE is limited at baseline, no muscle or joint tenderness, no midline tenderness in back or neck

## 2023-12-07 NOTE — ED ADULT NURSE NOTE - NSFALLHARMRISKINTERV_ED_ALL_ED
Assistance OOB with selected safe patient handling equipment if applicable/Communicate risk of Fall with Harm to all staff, patient, and family/Monitor gait and stability/Provide patient with walking aids/Provide visual cue: red socks, yellow wristband, yellow gown, etc/Reinforce activity limits and safety measures with patient and family/Bed in lowest position, wheels locked, appropriate side rails in place/Call bell, personal items and telephone in reach/Instruct patient to call for assistance before getting out of bed/chair/stretcher/Non-slip footwear applied when patient is off stretcher/[place] to call system/Physically safe environment - no spills, clutter or unnecessary equipment/Purposeful Proactive Rounding/Room/bathroom lighting operational, light cord in reach Assistance OOB with selected safe patient handling equipment if applicable/Communicate risk of Fall with Harm to all staff, patient, and family/Monitor gait and stability/Provide patient with walking aids/Provide visual cue: red socks, yellow wristband, yellow gown, etc/Reinforce activity limits and safety measures with patient and family/Bed in lowest position, wheels locked, appropriate side rails in place/Call bell, personal items and telephone in reach/Instruct patient to call for assistance before getting out of bed/chair/stretcher/Non-slip footwear applied when patient is off stretcher/[place] to call system/Physically safe environment - no spills, clutter or unnecessary equipment/Purposeful Proactive Rounding/Room/bathroom lighting operational, light cord in reach

## 2023-12-13 ENCOUNTER — RX RENEWAL (OUTPATIENT)
Age: 58
End: 2023-12-13

## 2023-12-14 ENCOUNTER — RX RENEWAL (OUTPATIENT)
Age: 58
End: 2023-12-14

## 2023-12-21 ENCOUNTER — APPOINTMENT (OUTPATIENT)
Dept: FAMILY MEDICINE | Facility: CLINIC | Age: 58
End: 2023-12-21

## 2023-12-26 ENCOUNTER — RX RENEWAL (OUTPATIENT)
Age: 58
End: 2023-12-26

## 2023-12-26 RX ORDER — DOCUSATE SODIUM 100 MG/1
100 CAPSULE, LIQUID FILLED ORAL TWICE DAILY
Qty: 60 | Refills: 1 | Status: ACTIVE | COMMUNITY
Start: 2023-09-19 | End: 1900-01-01

## 2023-12-27 ENCOUNTER — RX RENEWAL (OUTPATIENT)
Age: 58
End: 2023-12-27

## 2024-01-03 ENCOUNTER — RX RENEWAL (OUTPATIENT)
Age: 59
End: 2024-01-03

## 2024-01-07 ENCOUNTER — RX RENEWAL (OUTPATIENT)
Age: 59
End: 2024-01-07

## 2024-01-07 RX ORDER — HYDROCHLOROTHIAZIDE 25 MG/1
25 TABLET ORAL
Qty: 90 | Refills: 1 | Status: ACTIVE | COMMUNITY
Start: 2022-08-16 | End: 1900-01-01

## 2024-01-22 ENCOUNTER — RX RENEWAL (OUTPATIENT)
Age: 59
End: 2024-01-22

## 2024-01-23 ENCOUNTER — RX RENEWAL (OUTPATIENT)
Age: 59
End: 2024-01-23

## 2024-01-30 ENCOUNTER — APPOINTMENT (OUTPATIENT)
Dept: FAMILY MEDICINE | Facility: CLINIC | Age: 59
End: 2024-01-30

## 2024-02-06 ENCOUNTER — APPOINTMENT (OUTPATIENT)
Dept: FAMILY MEDICINE | Facility: CLINIC | Age: 59
End: 2024-02-06

## 2024-02-12 ENCOUNTER — APPOINTMENT (OUTPATIENT)
Dept: FAMILY MEDICINE | Facility: CLINIC | Age: 59
End: 2024-02-12

## 2024-02-12 DIAGNOSIS — L21.9 SEBORRHEIC DERMATITIS, UNSPECIFIED: ICD-10-CM

## 2024-02-12 RX ORDER — KETOCONAZOLE 20.5 MG/ML
2 SHAMPOO, SUSPENSION TOPICAL
Qty: 1 | Refills: 1 | Status: ACTIVE | COMMUNITY
Start: 2024-02-12 | End: 1900-01-01

## 2024-02-16 ENCOUNTER — APPOINTMENT (OUTPATIENT)
Dept: FAMILY MEDICINE | Facility: CLINIC | Age: 59
End: 2024-02-16

## 2024-02-16 ENCOUNTER — RX RENEWAL (OUTPATIENT)
Age: 59
End: 2024-02-16

## 2024-02-16 DIAGNOSIS — S06.6XAA TRAUMATIC SUBARACHNOID HEMORRHAGE WITH LOSS OF CONSCIOUSNESS STATUS UNKNOWN, INITIAL ENCOUNTER: ICD-10-CM

## 2024-02-16 RX ORDER — DOCUSATE SODIUM 100 MG/1
100 CAPSULE, LIQUID FILLED ORAL TWICE DAILY
Qty: 60 | Refills: 0 | Status: ACTIVE | COMMUNITY
Start: 2024-02-16 | End: 1900-01-01

## 2024-02-23 ENCOUNTER — OUTPATIENT (OUTPATIENT)
Dept: OUTPATIENT SERVICES | Facility: HOSPITAL | Age: 59
LOS: 1 days | End: 2024-02-23
Payer: MEDICARE

## 2024-02-23 DIAGNOSIS — Z98.890 OTHER SPECIFIED POSTPROCEDURAL STATES: Chronic | ICD-10-CM

## 2024-02-23 DIAGNOSIS — Z89.022 ACQUIRED ABSENCE OF LEFT FINGER(S): Chronic | ICD-10-CM

## 2024-02-23 DIAGNOSIS — G47.33 OBSTRUCTIVE SLEEP APNEA (ADULT) (PEDIATRIC): ICD-10-CM

## 2024-02-23 DIAGNOSIS — S06.9X9A UNSPECIFIED INTRACRANIAL INJURY WITH LOSS OF CONSCIOUSNESS OF UNSPECIFIED DURATION, INITIAL ENCOUNTER: Chronic | ICD-10-CM

## 2024-02-23 PROCEDURE — 95811 POLYSOM 6/>YRS CPAP 4/> PARM: CPT | Mod: 26

## 2024-02-23 PROCEDURE — 95811 POLYSOM 6/>YRS CPAP 4/> PARM: CPT

## 2024-03-05 ENCOUNTER — APPOINTMENT (OUTPATIENT)
Dept: FAMILY MEDICINE | Facility: CLINIC | Age: 59
End: 2024-03-05

## 2024-03-07 ENCOUNTER — RX RENEWAL (OUTPATIENT)
Age: 59
End: 2024-03-07

## 2024-03-12 ENCOUNTER — RX RENEWAL (OUTPATIENT)
Age: 59
End: 2024-03-12

## 2024-03-20 ENCOUNTER — RX RENEWAL (OUTPATIENT)
Age: 59
End: 2024-03-20

## 2024-03-20 RX ORDER — QUETIAPINE FUMARATE 100 MG/1
100 TABLET ORAL
Qty: 90 | Refills: 0 | Status: ACTIVE | COMMUNITY
Start: 2020-05-21 | End: 1900-01-01

## 2024-03-21 ENCOUNTER — RX RENEWAL (OUTPATIENT)
Age: 59
End: 2024-03-21

## 2024-03-26 RX ORDER — SERTRALINE HYDROCHLORIDE 100 MG/1
100 TABLET, FILM COATED ORAL
Qty: 90 | Refills: 0 | Status: ACTIVE | COMMUNITY
Start: 2021-05-06

## 2024-04-10 RX ORDER — AMOXICILLIN 500 MG/1
500 TABLET, FILM COATED ORAL
Qty: 14 | Refills: 0 | Status: ACTIVE | COMMUNITY
Start: 2024-04-10 | End: 1900-01-01

## 2024-04-17 PROBLEM — S06.9X9A UNSPECIFIED INTRACRANIAL INJURY WITH LOSS OF CONSCIOUSNESS OF UNSPECIFIED DURATION, INITIAL ENCOUNTER: Chronic | Status: ACTIVE | Noted: 2018-12-10

## 2024-04-27 ENCOUNTER — EMERGENCY (EMERGENCY)
Facility: HOSPITAL | Age: 59
LOS: 1 days | Discharge: HOME | End: 2024-04-27
Attending: EMERGENCY MEDICINE
Payer: MEDICARE

## 2024-04-27 VITALS
SYSTOLIC BLOOD PRESSURE: 128 MMHG | OXYGEN SATURATION: 98 % | RESPIRATION RATE: 18 BRPM | HEART RATE: 67 BPM | WEIGHT: 199.96 LBS | TEMPERATURE: 98 F | DIASTOLIC BLOOD PRESSURE: 67 MMHG

## 2024-04-27 DIAGNOSIS — Z98.890 OTHER SPECIFIED POSTPROCEDURAL STATES: Chronic | ICD-10-CM

## 2024-04-27 DIAGNOSIS — S06.9X9A UNSPECIFIED INTRACRANIAL INJURY WITH LOSS OF CONSCIOUSNESS OF UNSPECIFIED DURATION, INITIAL ENCOUNTER: Chronic | ICD-10-CM

## 2024-04-27 DIAGNOSIS — Z89.022 ACQUIRED ABSENCE OF LEFT FINGER(S): Chronic | ICD-10-CM

## 2024-04-27 PROCEDURE — 73590 X-RAY EXAM OF LOWER LEG: CPT | Mod: 26,LT

## 2024-04-27 PROCEDURE — 99285 EMERGENCY DEPT VISIT HI MDM: CPT | Mod: 1L

## 2024-04-27 PROCEDURE — 73562 X-RAY EXAM OF KNEE 3: CPT | Mod: 26,LT

## 2024-04-27 RX ORDER — KETOROLAC TROMETHAMINE 30 MG/ML
15 SYRINGE (ML) INJECTION ONCE
Refills: 0 | Status: DISCONTINUED | OUTPATIENT
Start: 2024-04-27 | End: 2024-04-27

## 2024-04-27 RX ORDER — OXYCODONE HYDROCHLORIDE 5 MG/1
5 TABLET ORAL ONCE
Refills: 0 | Status: DISCONTINUED | OUTPATIENT
Start: 2024-04-27 | End: 2024-04-27

## 2024-04-27 RX ORDER — ACETAMINOPHEN 500 MG
650 TABLET ORAL ONCE
Refills: 0 | Status: COMPLETED | OUTPATIENT
Start: 2024-04-27 | End: 2024-04-27

## 2024-04-27 RX ORDER — LIDOCAINE 4 G/100G
1 CREAM TOPICAL ONCE
Refills: 0 | Status: COMPLETED | OUTPATIENT
Start: 2024-04-27 | End: 2024-04-27

## 2024-04-27 RX ADMIN — OXYCODONE HYDROCHLORIDE 5 MILLIGRAM(S): 5 TABLET ORAL at 20:54

## 2024-04-27 RX ADMIN — Medication 15 MILLIGRAM(S): at 20:55

## 2024-04-27 RX ADMIN — Medication 15 MILLIGRAM(S): at 18:59

## 2024-04-27 RX ADMIN — Medication 650 MILLIGRAM(S): at 20:54

## 2024-04-27 RX ADMIN — LIDOCAINE 1 PATCH: 4 CREAM TOPICAL at 20:55

## 2024-04-27 NOTE — ED ADULT NURSE NOTE - NSICDXPASTSURGICALHX_GEN_ALL_CORE_FT
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
## 2024-04-27 NOTE — ED PROVIDER NOTE - ATTENDING CONTRIBUTION TO CARE
Fall from wheelchair found to have comminuted distal femur fracture. No other injuries noted one exam. Orthopedics consulted for management recommendations.

## 2024-04-27 NOTE — ED PROVIDER NOTE - PROGRESS NOTE DETAILS
Pt received in sign out. distal femur fx on xray. ortho applied splint. NWB LLE, straight leg, DVTppx w/ lovenox per ortho. Pt placed in observation for case management as his current wheelchair cannot accommodate straight leg and he will need outpatient lovenox. -Monica

## 2024-04-27 NOTE — ED PROVIDER NOTE - PHYSICAL EXAMINATION
General: Awake, alert, lying in bed in NAD  HEENT: Normocephalic, atraumatic. No scleral icterus or conjunctival injection. EOMI. Moist mucous membranes. Oropharynx clear.   Neck:. Soft and supple.  Cardiac: RRR, Peripheral pulses 2+ and symmetric. No LE edema.  Resp: Lungs CTAB. No accessory muscle use  Abd: Soft, non-tender, non-distended. No guarding, rebound, or rigidity.  MSK: L knee with effusion, decreased ROM 2/2 pain  Skin: L knee with superficial abrasion.  Neuro: AO x 4. Decreased bilateral lower extremity strength (at baseline per pt).   Psych: Appropriate mood and affect

## 2024-04-27 NOTE — ED PROVIDER NOTE - OBJECTIVE STATEMENT
58-year-old male history of paraplegia, TBI presents with left knee pain after a fall today.  Patient states he was outside with his aide and rolled over a rock in his wheelchair. He fell forward from his wheelchair and hit his left knee on the pavement. Denies head strike, pain in other extremities, LOC. Not on AC. Took Tylenol PTA
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
## 2024-04-27 NOTE — ED ADULT NURSE NOTE - NSFALLRISKINTERV_ED_ALL_ED
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
## 2024-04-27 NOTE — ED PROVIDER NOTE - CLINICAL SUMMARY MEDICAL DECISION MAKING FREE TEXT BOX
58-year-old male history of paraplegia, TBI, wheelchair-bound presents with left knee pain and swelling after fall from his wheelchair today.  Will obtain x-ray imaging to evaluate for acute bony fracture, symptom control pending results, reassess
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
## 2024-04-28 LAB
ANION GAP SERPL CALC-SCNC: 13 MMOL/L — SIGNIFICANT CHANGE UP (ref 5–17)
APTT BLD: 33 SEC — SIGNIFICANT CHANGE UP (ref 24.5–35.6)
BLD GP AB SCN SERPL QL: SIGNIFICANT CHANGE UP
BUN SERPL-MCNC: 13.2 MG/DL — SIGNIFICANT CHANGE UP (ref 8–20)
CALCIUM SERPL-MCNC: 9.3 MG/DL — SIGNIFICANT CHANGE UP (ref 8.4–10.5)
CHLORIDE SERPL-SCNC: 101 MMOL/L — SIGNIFICANT CHANGE UP (ref 96–108)
CO2 SERPL-SCNC: 28 MMOL/L — SIGNIFICANT CHANGE UP (ref 22–29)
CREAT SERPL-MCNC: 0.65 MG/DL — SIGNIFICANT CHANGE UP (ref 0.5–1.3)
EGFR: 111 ML/MIN/1.73M2 — SIGNIFICANT CHANGE UP
GLUCOSE SERPL-MCNC: 97 MG/DL — SIGNIFICANT CHANGE UP (ref 70–99)
HCT VFR BLD CALC: 43.8 % — SIGNIFICANT CHANGE UP (ref 39–50)
HGB BLD-MCNC: 14.8 G/DL — SIGNIFICANT CHANGE UP (ref 13–17)
INR BLD: 0.99 RATIO — SIGNIFICANT CHANGE UP (ref 0.85–1.18)
MCHC RBC-ENTMCNC: 29.5 PG — SIGNIFICANT CHANGE UP (ref 27–34)
MCHC RBC-ENTMCNC: 33.8 GM/DL — SIGNIFICANT CHANGE UP (ref 32–36)
MCV RBC AUTO: 87.3 FL — SIGNIFICANT CHANGE UP (ref 80–100)
PLATELET # BLD AUTO: 187 K/UL — SIGNIFICANT CHANGE UP (ref 150–400)
POTASSIUM SERPL-MCNC: 3.5 MMOL/L — SIGNIFICANT CHANGE UP (ref 3.5–5.3)
POTASSIUM SERPL-SCNC: 3.5 MMOL/L — SIGNIFICANT CHANGE UP (ref 3.5–5.3)
PROTHROM AB SERPL-ACNC: 11 SEC — SIGNIFICANT CHANGE UP (ref 9.5–13)
RBC # BLD: 5.02 M/UL — SIGNIFICANT CHANGE UP (ref 4.2–5.8)
RBC # FLD: 14 % — SIGNIFICANT CHANGE UP (ref 10.3–14.5)
SODIUM SERPL-SCNC: 142 MMOL/L — SIGNIFICANT CHANGE UP (ref 135–145)
WBC # BLD: 11.54 K/UL — HIGH (ref 3.8–10.5)
WBC # FLD AUTO: 11.54 K/UL — HIGH (ref 3.8–10.5)

## 2024-04-28 PROCEDURE — 73700 CT LOWER EXTREMITY W/O DYE: CPT | Mod: 26,LT,MC

## 2024-04-28 PROCEDURE — 99223 1ST HOSP IP/OBS HIGH 75: CPT | Mod: 1L

## 2024-04-28 PROCEDURE — 93010 ELECTROCARDIOGRAM REPORT: CPT

## 2024-04-28 RX ORDER — QUETIAPINE FUMARATE 200 MG/1
50 TABLET, FILM COATED ORAL ONCE
Refills: 0 | Status: COMPLETED | OUTPATIENT
Start: 2024-04-28 | End: 2024-04-28

## 2024-04-28 RX ORDER — QUETIAPINE FUMARATE 200 MG/1
75 TABLET, FILM COATED ORAL ONCE
Refills: 0 | Status: COMPLETED | OUTPATIENT
Start: 2024-04-28 | End: 2024-04-28

## 2024-04-28 RX ORDER — TAMSULOSIN HYDROCHLORIDE 0.4 MG/1
0.4 CAPSULE ORAL AT BEDTIME
Refills: 0 | Status: DISCONTINUED | OUTPATIENT
Start: 2024-04-28 | End: 2024-05-05

## 2024-04-28 RX ORDER — QUETIAPINE FUMARATE 200 MG/1
125 TABLET, FILM COATED ORAL AT BEDTIME
Refills: 0 | Status: DISCONTINUED | OUTPATIENT
Start: 2024-04-29 | End: 2024-05-05

## 2024-04-28 RX ORDER — OXYCODONE HYDROCHLORIDE 5 MG/1
10 TABLET ORAL EVERY 6 HOURS
Refills: 0 | Status: DISCONTINUED | OUTPATIENT
Start: 2024-04-28 | End: 2024-04-30

## 2024-04-28 RX ORDER — DANTROLENE SODIUM 20 MG/60ML
100 INJECTION INTRAVENOUS EVERY 12 HOURS
Refills: 0 | Status: DISCONTINUED | OUTPATIENT
Start: 2024-04-28 | End: 2024-05-05

## 2024-04-28 RX ORDER — ENOXAPARIN SODIUM 100 MG/ML
40 INJECTION SUBCUTANEOUS ONCE
Refills: 0 | Status: COMPLETED | OUTPATIENT
Start: 2024-04-28 | End: 2024-04-28

## 2024-04-28 RX ORDER — MORPHINE SULFATE 50 MG/1
2 CAPSULE, EXTENDED RELEASE ORAL ONCE
Refills: 0 | Status: DISCONTINUED | OUTPATIENT
Start: 2024-04-28 | End: 2024-04-28

## 2024-04-28 RX ORDER — OXYCODONE HYDROCHLORIDE 5 MG/1
5 TABLET ORAL EVERY 6 HOURS
Refills: 0 | Status: DISCONTINUED | OUTPATIENT
Start: 2024-04-28 | End: 2024-04-30

## 2024-04-28 RX ORDER — SENNA PLUS 8.6 MG/1
2 TABLET ORAL AT BEDTIME
Refills: 0 | Status: DISCONTINUED | OUTPATIENT
Start: 2024-04-28 | End: 2024-05-05

## 2024-04-28 RX ORDER — ACETAMINOPHEN 500 MG
650 TABLET ORAL EVERY 6 HOURS
Refills: 0 | Status: DISCONTINUED | OUTPATIENT
Start: 2024-04-28 | End: 2024-05-05

## 2024-04-28 RX ORDER — ATORVASTATIN CALCIUM 80 MG/1
20 TABLET, FILM COATED ORAL AT BEDTIME
Refills: 0 | Status: DISCONTINUED | OUTPATIENT
Start: 2024-04-28 | End: 2024-05-05

## 2024-04-28 RX ORDER — SERTRALINE 25 MG/1
100 TABLET, FILM COATED ORAL DAILY
Refills: 0 | Status: DISCONTINUED | OUTPATIENT
Start: 2024-04-28 | End: 2024-05-05

## 2024-04-28 RX ORDER — ENOXAPARIN SODIUM 100 MG/ML
40 INJECTION SUBCUTANEOUS EVERY 24 HOURS
Refills: 0 | Status: DISCONTINUED | OUTPATIENT
Start: 2024-04-29 | End: 2024-05-05

## 2024-04-28 RX ORDER — BACLOFEN 100 %
20 POWDER (GRAM) MISCELLANEOUS EVERY 12 HOURS
Refills: 0 | Status: DISCONTINUED | OUTPATIENT
Start: 2024-04-28 | End: 2024-05-05

## 2024-04-28 RX ORDER — MORPHINE SULFATE 50 MG/1
4 CAPSULE, EXTENDED RELEASE ORAL ONCE
Refills: 0 | Status: DISCONTINUED | OUTPATIENT
Start: 2024-04-28 | End: 2024-04-28

## 2024-04-28 RX ORDER — POLYETHYLENE GLYCOL 3350 17 G/17G
17 POWDER, FOR SOLUTION ORAL DAILY
Refills: 0 | Status: DISCONTINUED | OUTPATIENT
Start: 2024-04-28 | End: 2024-05-05

## 2024-04-28 RX ORDER — ASPIRIN/CALCIUM CARB/MAGNESIUM 324 MG
81 TABLET ORAL DAILY
Refills: 0 | Status: DISCONTINUED | OUTPATIENT
Start: 2024-04-28 | End: 2024-05-05

## 2024-04-28 RX ORDER — QUETIAPINE FUMARATE 200 MG/1
125 TABLET, FILM COATED ORAL AT BEDTIME
Refills: 0 | Status: DISCONTINUED | OUTPATIENT
Start: 2024-04-28 | End: 2024-04-28

## 2024-04-28 RX ADMIN — ATORVASTATIN CALCIUM 20 MILLIGRAM(S): 80 TABLET, FILM COATED ORAL at 21:48

## 2024-04-28 RX ADMIN — DANTROLENE SODIUM 100 MILLIGRAM(S): 20 INJECTION INTRAVENOUS at 17:47

## 2024-04-28 RX ADMIN — Medication 15 MILLIGRAM(S): at 00:51

## 2024-04-28 RX ADMIN — Medication 650 MILLIGRAM(S): at 13:47

## 2024-04-28 RX ADMIN — MORPHINE SULFATE 2 MILLIGRAM(S): 50 CAPSULE, EXTENDED RELEASE ORAL at 02:30

## 2024-04-28 RX ADMIN — OXYCODONE HYDROCHLORIDE 5 MILLIGRAM(S): 5 TABLET ORAL at 00:51

## 2024-04-28 RX ADMIN — Medication 81 MILLIGRAM(S): at 12:11

## 2024-04-28 RX ADMIN — SENNA PLUS 2 TABLET(S): 8.6 TABLET ORAL at 21:48

## 2024-04-28 RX ADMIN — OXYCODONE HYDROCHLORIDE 10 MILLIGRAM(S): 5 TABLET ORAL at 21:51

## 2024-04-28 RX ADMIN — ENOXAPARIN SODIUM 40 MILLIGRAM(S): 100 INJECTION SUBCUTANEOUS at 06:26

## 2024-04-28 RX ADMIN — Medication 650 MILLIGRAM(S): at 00:51

## 2024-04-28 RX ADMIN — MORPHINE SULFATE 4 MILLIGRAM(S): 50 CAPSULE, EXTENDED RELEASE ORAL at 05:34

## 2024-04-28 RX ADMIN — MORPHINE SULFATE 2 MILLIGRAM(S): 50 CAPSULE, EXTENDED RELEASE ORAL at 05:34

## 2024-04-28 RX ADMIN — MORPHINE SULFATE 4 MILLIGRAM(S): 50 CAPSULE, EXTENDED RELEASE ORAL at 00:49

## 2024-04-28 RX ADMIN — DANTROLENE SODIUM 100 MILLIGRAM(S): 20 INJECTION INTRAVENOUS at 09:21

## 2024-04-28 RX ADMIN — Medication 20 MILLIGRAM(S): at 17:47

## 2024-04-28 RX ADMIN — OXYCODONE HYDROCHLORIDE 5 MILLIGRAM(S): 5 TABLET ORAL at 09:16

## 2024-04-28 RX ADMIN — POLYETHYLENE GLYCOL 3350 17 GRAM(S): 17 POWDER, FOR SOLUTION ORAL at 12:11

## 2024-04-28 RX ADMIN — TAMSULOSIN HYDROCHLORIDE 0.4 MILLIGRAM(S): 0.4 CAPSULE ORAL at 21:48

## 2024-04-28 RX ADMIN — SERTRALINE 100 MILLIGRAM(S): 25 TABLET, FILM COATED ORAL at 12:11

## 2024-04-28 RX ADMIN — OXYCODONE HYDROCHLORIDE 10 MILLIGRAM(S): 5 TABLET ORAL at 16:06

## 2024-04-28 RX ADMIN — QUETIAPINE FUMARATE 50 MILLIGRAM(S): 200 TABLET, FILM COATED ORAL at 16:06

## 2024-04-28 RX ADMIN — OXYCODONE HYDROCHLORIDE 10 MILLIGRAM(S): 5 TABLET ORAL at 06:26

## 2024-04-28 RX ADMIN — QUETIAPINE FUMARATE 75 MILLIGRAM(S): 200 TABLET, FILM COATED ORAL at 21:52

## 2024-04-28 RX ADMIN — Medication 20 MILLIGRAM(S): at 09:16
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
## 2024-04-28 NOTE — ED CDU PROVIDER INITIAL DAY NOTE - OBJECTIVE STATEMENT
58-year-old male history of paraplegia, TBI ,  hypertension, hyperlipidemia,  sleep issue presents with left knee pain after a fall today.  Patient states he was outside with his aide and rolled over a rock while he was on wheelchair. He fell forward from his wheelchair and hit his left knee on the pavement. Denies head strike, pain in other extremities, LOC. Not on AC. Took Tylenol PTA. patient denies any history of GI bleeding

## 2024-04-28 NOTE — ED CDU PROVIDER INITIAL DAY NOTE - PHYSICAL EXAMINATION
General: Awake, alert, lying in bed in NAD  HEENT: Normocephalic, atraumatic.   Neck:. Soft and supple.  Cardiac: RRR, Peripheral pulses 2+ and symmetric. B/l LE edema none pitting noted   Resp: Lungs CTAB. No accessory muscle use  Abd: Soft, non-tender, non-distended.   MSK: L knee with effusion, on the knee immobilizer- unable to move his LE  secondary to  paraplegia the bedside   Skin: L knee with superficial abrasion.  Neuro: AO x 3. Decreased bilateral lower extremity strength (at baseline per pt).   Psych: Appropriate mood and affect

## 2024-04-28 NOTE — ED CDU PROVIDER INITIAL DAY NOTE - NSICDXFAMILYHX_GEN_ALL_CORE_FT
FAMILY HISTORY:  FH: type 2 diabetes    Father  Still living? Unknown  FH: colon cancer, Age at diagnosis: Age Unknown

## 2024-04-28 NOTE — ED CDU PROVIDER INITIAL DAY NOTE - PROGRESS NOTE DETAILS
Discussed case with CM. Visiting nurse to be arranged. RN to provide Lovenox injection teaching in AM. Likely dispo tomorrow after pt demonstrates competency in self injection. Received during sign out. Patient in NAD. Respirations even and unlabored. Pending AM reassessment for dispo.

## 2024-04-29 PROCEDURE — 99232 SBSQ HOSP IP/OBS MODERATE 35: CPT | Mod: 1L

## 2024-04-29 RX ORDER — ENOXAPARIN SODIUM 100 MG/ML
40 INJECTION SUBCUTANEOUS
Qty: 11.2 | Refills: 0
Start: 2024-04-29 | End: 2024-05-28

## 2024-04-29 RX ADMIN — Medication 81 MILLIGRAM(S): at 11:23

## 2024-04-29 RX ADMIN — QUETIAPINE FUMARATE 125 MILLIGRAM(S): 200 TABLET, FILM COATED ORAL at 22:34

## 2024-04-29 RX ADMIN — DANTROLENE SODIUM 100 MILLIGRAM(S): 20 INJECTION INTRAVENOUS at 17:38

## 2024-04-29 RX ADMIN — SERTRALINE 100 MILLIGRAM(S): 25 TABLET, FILM COATED ORAL at 11:23

## 2024-04-29 RX ADMIN — ENOXAPARIN SODIUM 40 MILLIGRAM(S): 100 INJECTION SUBCUTANEOUS at 17:36

## 2024-04-29 RX ADMIN — Medication 20 MILLIGRAM(S): at 05:14

## 2024-04-29 RX ADMIN — DANTROLENE SODIUM 100 MILLIGRAM(S): 20 INJECTION INTRAVENOUS at 05:14

## 2024-04-29 RX ADMIN — ENOXAPARIN SODIUM 40 MILLIGRAM(S): 100 INJECTION SUBCUTANEOUS at 05:13

## 2024-04-29 RX ADMIN — Medication 20 MILLIGRAM(S): at 17:39

## 2024-04-29 RX ADMIN — OXYCODONE HYDROCHLORIDE 5 MILLIGRAM(S): 5 TABLET ORAL at 20:17

## 2024-04-29 RX ADMIN — ATORVASTATIN CALCIUM 20 MILLIGRAM(S): 80 TABLET, FILM COATED ORAL at 22:34

## 2024-04-29 RX ADMIN — POLYETHYLENE GLYCOL 3350 17 GRAM(S): 17 POWDER, FOR SOLUTION ORAL at 11:23

## 2024-04-29 RX ADMIN — OXYCODONE HYDROCHLORIDE 5 MILLIGRAM(S): 5 TABLET ORAL at 01:57

## 2024-04-29 RX ADMIN — OXYCODONE HYDROCHLORIDE 10 MILLIGRAM(S): 5 TABLET ORAL at 00:03

## 2024-04-29 RX ADMIN — SENNA PLUS 2 TABLET(S): 8.6 TABLET ORAL at 22:35

## 2024-04-29 RX ADMIN — OXYCODONE HYDROCHLORIDE 10 MILLIGRAM(S): 5 TABLET ORAL at 09:04

## 2024-04-29 RX ADMIN — TAMSULOSIN HYDROCHLORIDE 0.4 MILLIGRAM(S): 0.4 CAPSULE ORAL at 22:35

## 2024-04-29 NOTE — ED CDU PROVIDER DISPOSITION NOTE - SPECIALTY CARE
Discharge instructions given to pt. Pt is alert and orientated x4.         Bee Claire RN  04/14/21 [id number]
Orthopedic Surgery

## 2024-04-29 NOTE — ED CDU PROVIDER DISPOSITION NOTE - CLINICAL COURSE
59 y/o M with femur fracture - ortho saw and advised 4 weeks lovenox - pt demonstrated ability to administer

## 2024-04-29 NOTE — CHART NOTE - NSCHARTNOTEFT_GEN_A_CORE
Fran: pt ready for d/c , requesting transportation assistance >pt states he usually goes by lette, however, pt unable to bend the leg. Will need immanuel, pt calling his aide or a family member to be at home upon his arrival (immanuel unable to leave pt without someone accepting him). SW to follow. 17:39SWNote: per pt's RN, pt ready for d/c , requesting transportation assistance. Pt states he usually goes by lette, however, pt unable to bend the leg. Will need immanuel, pt calling his aide or a family member to be at home upon his arrival (immanuel unable to leave pt without someone accepting him). SW to follow.  18:05 Pt called his aide office closed , unable to resume services this evening . Per RN (Cedric) pt to stay overnight, will need  aide services set up . Case will be left in hand off for f/u in the am.

## 2024-04-30 ENCOUNTER — APPOINTMENT (OUTPATIENT)
Dept: FAMILY MEDICINE | Facility: CLINIC | Age: 59
End: 2024-04-30

## 2024-04-30 VITALS
OXYGEN SATURATION: 94 % | DIASTOLIC BLOOD PRESSURE: 76 MMHG | HEART RATE: 105 BPM | SYSTOLIC BLOOD PRESSURE: 117 MMHG | RESPIRATION RATE: 17 BRPM | TEMPERATURE: 99 F

## 2024-04-30 PROCEDURE — 80048 BASIC METABOLIC PNL TOTAL CA: CPT

## 2024-04-30 PROCEDURE — 99239 HOSP IP/OBS DSCHRG MGMT >30: CPT

## 2024-04-30 PROCEDURE — 36415 COLL VENOUS BLD VENIPUNCTURE: CPT

## 2024-04-30 PROCEDURE — 86900 BLOOD TYPING SEROLOGIC ABO: CPT

## 2024-04-30 PROCEDURE — 85730 THROMBOPLASTIN TIME PARTIAL: CPT

## 2024-04-30 PROCEDURE — 86901 BLOOD TYPING SEROLOGIC RH(D): CPT

## 2024-04-30 PROCEDURE — 94660 CPAP INITIATION&MGMT: CPT

## 2024-04-30 PROCEDURE — 85610 PROTHROMBIN TIME: CPT

## 2024-04-30 PROCEDURE — 85027 COMPLETE CBC AUTOMATED: CPT

## 2024-04-30 PROCEDURE — 93005 ELECTROCARDIOGRAM TRACING: CPT

## 2024-04-30 PROCEDURE — 96375 TX/PRO/DX INJ NEW DRUG ADDON: CPT | Mod: XU

## 2024-04-30 PROCEDURE — 86850 RBC ANTIBODY SCREEN: CPT

## 2024-04-30 PROCEDURE — 73590 X-RAY EXAM OF LOWER LEG: CPT

## 2024-04-30 PROCEDURE — 73562 X-RAY EXAM OF KNEE 3: CPT

## 2024-04-30 PROCEDURE — 27520 TREAT KNEECAP FRACTURE: CPT | Mod: LT

## 2024-04-30 PROCEDURE — 96374 THER/PROPH/DIAG INJ IV PUSH: CPT | Mod: XU

## 2024-04-30 PROCEDURE — 96376 TX/PRO/DX INJ SAME DRUG ADON: CPT | Mod: XU

## 2024-04-30 PROCEDURE — G0378: CPT

## 2024-04-30 PROCEDURE — 99285 EMERGENCY DEPT VISIT HI MDM: CPT | Mod: 25

## 2024-04-30 PROCEDURE — 73700 CT LOWER EXTREMITY W/O DYE: CPT | Mod: MC

## 2024-04-30 RX ORDER — OXYCODONE HYDROCHLORIDE 5 MG/1
1 TABLET ORAL
Qty: 12 | Refills: 0
Start: 2024-04-30 | End: 2024-05-02

## 2024-04-30 RX ADMIN — Medication 20 MILLIGRAM(S): at 05:44

## 2024-04-30 RX ADMIN — Medication 81 MILLIGRAM(S): at 11:29

## 2024-04-30 RX ADMIN — POLYETHYLENE GLYCOL 3350 17 GRAM(S): 17 POWDER, FOR SOLUTION ORAL at 11:28

## 2024-04-30 RX ADMIN — DANTROLENE SODIUM 100 MILLIGRAM(S): 20 INJECTION INTRAVENOUS at 05:41

## 2024-04-30 RX ADMIN — SERTRALINE 100 MILLIGRAM(S): 25 TABLET, FILM COATED ORAL at 11:29

## 2024-04-30 RX ADMIN — DANTROLENE SODIUM 100 MILLIGRAM(S): 20 INJECTION INTRAVENOUS at 17:25

## 2024-04-30 RX ADMIN — OXYCODONE HYDROCHLORIDE 5 MILLIGRAM(S): 5 TABLET ORAL at 06:43

## 2024-04-30 RX ADMIN — Medication 20 MILLIGRAM(S): at 17:24

## 2024-04-30 RX ADMIN — OXYCODONE HYDROCHLORIDE 10 MILLIGRAM(S): 5 TABLET ORAL at 17:31

## 2024-04-30 RX ADMIN — OXYCODONE HYDROCHLORIDE 10 MILLIGRAM(S): 5 TABLET ORAL at 09:43

## 2024-04-30 NOTE — ED CDU PROVIDER DISPOSITION NOTE - ATTENDING CONTRIBUTION TO CARE
[de-identified] : I have reviewed the most recent MRI 2/21/24 at [facility] which shows tiny enhancing lesion along the anterior superior aspect of the left porous acusticus measuring 4 mm x 2 mm may reflect a schwannoma. Differential consideration includes a tiny meningioma. 
58-year-old male history of paraplegia wheelchair-bound, TBI, hypertension, dyslipidemia presents with a fall out of wheelchair found to have distal femur fracture.  Ortho consulted, splint applied, no surgical intervention.  Lovenox for 4 weeks.   Case management consulted,  home care resumed.  Ambulance arranged.

## 2024-04-30 NOTE — ED CDU PROVIDER DISPOSITION NOTE - PATIENT PORTAL LINK FT
You can access the FollowMyHealth Patient Portal offered by [facility] by registering at the following website: [email or web address]. By joining [facility]’s FollowMyHealth portal, you will also be able to view your health information using other applications (apps) compatible with our system.
You can access the FollowMyHealth Patient Portal offered by [facility] by registering at the following website: [email or web address]. By joining [facility]’s FollowMyHealth portal, you will also be able to view your health information using other applications (apps) compatible with our system.

## 2024-04-30 NOTE — ED CDU PROVIDER SUBSEQUENT DAY NOTE - HISTORY
No events. No pertinent interval history. Vital signs remained stable overnight. Received no calls by RN overnight.
No acute events overnight

## 2024-04-30 NOTE — CHART NOTE - NSCHARTNOTEFT_GEN_A_CORE
CM following for dc plan.  Aide is now available for resumption 5/1 @7am.  Timmy arranged for 5 pm, transport form given.  TERI arranged for lovenox teaching.  NO copay.

## 2024-04-30 NOTE — ED ADULT NURSE REASSESSMENT NOTE - COMFORT CARE
darkened lights/side rails up/warm blanket provided
plan of care explained/repositioned/side rails up/warm blanket provided
plan of care explained/po fluids offered/warm blanket provided
meal provided/plan of care explained

## 2024-04-30 NOTE — ED ADULT NURSE REASSESSMENT NOTE - MUSCULOSKELETAL ASSISTIVE DEVICES
-- DO NOT REPLY / DO NOT REPLY ALL --  -- Message is from the [facility]--    COVID-19 Universal Screening: Positive    General Patient Message      Reason for Call: Complaints of non-productive coughing after getting covid vaccine times three weeks ago and unsure if its okay to keep her appointment tomorrow. Please call patient advise. Patient will be going to [facility] today for her coughing.    Caller Information       Type Contact Phone    03/23/2021 11:04 AM CDT Phone (Incoming) Anna Schreiber (Self) [phone] (M)          Alternative phone number: n/a    Turnaround time given to caller:   \"This message will be sent to [state Provider's name]. The clinical team will fulfill your request as soon as they review your message.\"     knee brace, left

## 2024-04-30 NOTE — ED CDU PROVIDER SUBSEQUENT DAY NOTE - NSICDXFAMILYHX_GEN_ALL_CORE_FT
FAMILY HISTORY:  FH: type 2 diabetes    Father  Still living? Unknown  FH: colon cancer, Age at diagnosis: Age Unknown    
FAMILY HISTORY:  FH: type 2 diabetes    Father  Still living? Unknown  FH: colon cancer, Age at diagnosis: Age Unknown

## 2024-04-30 NOTE — ED ADULT NURSE REASSESSMENT NOTE - NURSING MUSC STRENGTH
limitations in strength
hand grasp, leg strength strong and equal bilaterally
limitations in strength
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
## 2024-04-30 NOTE — ED CDU PROVIDER SUBSEQUENT DAY NOTE - PHYSICAL EXAMINATION
General: Awake, alert, lying in bed in NAD  HEENT: Normocephalic, atraumatic.   Neck:. Soft and supple.  Cardiac: RRR, Peripheral pulses 2+ and symmetric. B/l LE edema none pitting noted   Resp: Lungs CTAB. No accessory muscle use  Abd: Soft, non-tender, non-distended.   MSK: L knee with effusion, on the knee immobilizer- unable to move his LE  secondary to  paraplegia the bedside   Skin: L knee splinted and in knee immbolizer  Neuro: AO x 3. Decreased bilateral lower extremity strength (at baseline per pt).   Psych: Appropriate mood and affect
General: Awake, alert, lying in bed in NAD  HEENT: Normocephalic, atraumatic.   Neck:. Soft and supple.  Cardiac: RRR, Peripheral pulses 2+ and symmetric. B/l LE edema none pitting noted   Resp: Lungs CTAB. No accessory muscle use  Abd: Soft, non-tender, non-distended.   MSK: L knee with effusion, on the knee immobilizer- unable to move his LE  secondary to  paraplegia the bedside   Skin: L knee splinted and in knee immbolizer  Neuro: AO x 3. Decreased bilateral lower extremity strength (at baseline per pt).   Psych: Appropriate mood and affect

## 2024-04-30 NOTE — ED CDU PROVIDER DISPOSITION NOTE - NSFOLLOWUPINSTRUCTIONS_ED_ALL_ED_FT
take 40mg lovenox once a day at 6pm x 4 weeks.
- Remain in bulky wrap and knee immobilizer, Non-weightbearing to left lower extremity   - Use Lovenox as directed  - Follow-up with Dr. Pelaez within 2-4 weeks    Fracture    A fracture is a break in one of your bones. This can occur from a variety of injuries, especially traumatic ones. Symptoms include pain, bruising, or swelling. Do not use the injured limb. If a fracture is in one of the bones below your waist, do not put weight on that limb unless instructed to do so by your healthcare provider. Crutches or a cane may have been provided. A splint or cast may have been applied by your health care provider. Make sure to keep it dry and follow up with an orthopedist as instructed.    SEEK IMMEDIATE MEDICAL CARE IF YOU HAVE ANY OF THE FOLLOWING SYMPTOMS: numbness, tingling, increasing pain, or weakness in any part of the injured limb.
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
## 2024-04-30 NOTE — ED ADULT NURSE REASSESSMENT NOTE - GENERAL PATIENT STATE
comfortable appearance/resting/sleeping
comfortable appearance/resting/sleeping
comfortable appearance/cooperative
comfortable appearance

## 2024-04-30 NOTE — ED CDU PROVIDER SUBSEQUENT DAY NOTE - NS ED ATTENDING STATEMENT MOD
This was a shared visit with the MIRA. I reviewed and verified the documentation.
This was a shared visit with the MIRA. I reviewed and verified the documentation.

## 2024-04-30 NOTE — ED ADULT NURSE REASSESSMENT NOTE - NS ED NURSE REASSESS COMMENT FT1
Pt A&Ox4, rr even and unlabored. Pt requested medication for pain. Medicated per orders
Pt care assumed @0715. Pt resting in bed. VSS. Resp even and unlabored. Awaiting SW at this time. Pt updated on plan of care and verbalizes understanding.
REC PT. FROM DAY SHIFT. PT. IS AAOX4. STATES HE HAS KNEE PAIN, MD TO ORDER PAIN MEDS. SAFETY MAINTAINED.
Report given to Cedric JOYCE. Pt moved to obs. VSS. Resp even and unlabored. NAD at this time.
ortho at bedside putting on brace for left distal femur fx.
pt given bed bath, perineal care performed, and repositioned in bed. pt comfortable appearing and in NAD. pt informed of poc and safety maintained
pt. is awake, states he cannot sleep. no acute resp distress noted, appears comfortable. safety maintained.
pt. is awake, vitals done. lovenox teaching done with pt. pt. able to inject himself but doesn't need know how to pull back with safety lock. further teaching needed. vss. safety maintained.
rec pt. from day shift. pt. is aaox4, hx of tbi. states he still has pain at fx site. fx site wrapped in ace bandage by ortho. denies chest pain, no acute resp distress noted. safety maintained.
pt resting comfortably in bed in NAD, equal chest rise and fall noted. pt safety maintained with bed locked in lowest position. pt remains on CPAP.
assumed care of ISIAH BAXTER at 1915. pt resting in bed, in NAD, resps even and unlabored. pt denies any chest pain, sob, weakness, headaches, chills, diaphoresis. pt currently c/o of pain to left leg and pain meds given as per provider orders. pt informed of poc and verbalizes understanding. safety maintained.
Received pt from previous shift. Pt AOx4, VSS, no acute distress at this time. Denies any pain/discomfort. Brace placed on left knee. Safety precautions in place. Plan of care ongoing
Assumed care of pt at 1248 as stated in report from RN BETH. Charting as noted. Patient A&O x4, denies pain/discomfort, denies CP/SOB. Updated on the plan of care. Call bell within reach, bed locked in lowest position. IV site flushed w/ NS. No redness, swelling or pain noted to site. No signs of acute distress noted, safety maintained.
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
## 2024-04-30 NOTE — ED CDU PROVIDER DISPOSITION NOTE - CARE PROVIDERS DIRECT ADDRESSES
[email or web address]
[email or web address]

## 2024-04-30 NOTE — ED CDU PROVIDER DISPOSITION NOTE - DISPOSITION TYPE
Chemo today  Flu shot today    In 2 weeks, RTC to see me and labs and next chemo    
DISCHARGE
DISCHARGE

## 2024-04-30 NOTE — ED ADULT NURSE REASSESSMENT NOTE - NURSING MUSC JOINTS
joint limitation left...
no pain, swelling or deformity of joints
no pain, swelling or deformity of joints

## 2024-04-30 NOTE — ED CDU PROVIDER SUBSEQUENT DAY NOTE - PROGRESS NOTE DETAILS
pt evaluated on AM rounds, resting comfortably. pending CM intervention to have pt accepted back with current aide agency. knee immobilizer in place. pt feels comfortable self-administering lovenox
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
## 2024-04-30 NOTE — ED CDU PROVIDER DISPOSITION NOTE - CARE PROVIDER_API CALL
Jaxon Pelaez  Orthopaedic Surgery  [address] [place] [id number]  Phone: [phone]  Fax: [phone]  Follow Up Time:   
Jaxon Pelaez  Orthopaedic Surgery  [address] [place] [id number]  Phone: [phone]  Fax: [phone]  Follow Up Time:

## 2024-06-01 ENCOUNTER — RX RENEWAL (OUTPATIENT)
Age: 59
End: 2024-06-01

## 2024-06-04 ENCOUNTER — APPOINTMENT (OUTPATIENT)
Dept: PULMONOLOGY | Facility: CLINIC | Age: 59
End: 2024-06-04

## 2024-06-05 ENCOUNTER — RX RENEWAL (OUTPATIENT)
Age: 59
End: 2024-06-05

## 2024-06-05 RX ORDER — ASPIRIN 81 MG/1
81 TABLET, COATED ORAL DAILY
Qty: 90 | Refills: 0 | Status: ACTIVE | COMMUNITY
Start: 2021-11-04 | End: 1900-01-01

## 2024-06-21 ENCOUNTER — RX RENEWAL (OUTPATIENT)
Age: 59
End: 2024-06-21

## 2024-06-21 RX ORDER — TAMSULOSIN HYDROCHLORIDE 0.4 MG/1
0.4 CAPSULE ORAL
Qty: 90 | Refills: 0 | Status: ACTIVE | COMMUNITY
Start: 2022-11-16 | End: 1900-01-01

## 2024-07-14 PROBLEM — M79.605 PAIN OF LEFT LOWER EXTREMITY: Status: ACTIVE | Noted: 2024-07-14

## 2024-07-14 PROBLEM — M79.604 PAIN OF RIGHT LOWER EXTREMITY: Status: ACTIVE | Noted: 2024-07-14

## 2024-07-15 ENCOUNTER — APPOINTMENT (OUTPATIENT)
Dept: ORTHOPEDIC SURGERY | Facility: CLINIC | Age: 59
End: 2024-07-15

## 2024-07-15 VITALS
HEIGHT: 72 IN | HEART RATE: 60 BPM | WEIGHT: 230 LBS | BODY MASS INDEX: 31.15 KG/M2 | SYSTOLIC BLOOD PRESSURE: 138 MMHG | DIASTOLIC BLOOD PRESSURE: 80 MMHG

## 2024-07-28 PROBLEM — M25.562 LEFT KNEE PAIN, UNSPECIFIED CHRONICITY: Status: ACTIVE | Noted: 2024-07-28

## 2024-07-29 ENCOUNTER — APPOINTMENT (OUTPATIENT)
Dept: ORTHOPEDIC SURGERY | Facility: CLINIC | Age: 59
End: 2024-07-29

## 2024-08-14 ENCOUNTER — APPOINTMENT (OUTPATIENT)
Dept: ORTHOPEDIC SURGERY | Facility: CLINIC | Age: 59
End: 2024-08-14

## 2024-08-23 ENCOUNTER — APPOINTMENT (OUTPATIENT)
Dept: VASCULAR SURGERY | Facility: CLINIC | Age: 59
End: 2024-08-23

## 2024-09-17 ENCOUNTER — NON-APPOINTMENT (OUTPATIENT)
Age: 59
End: 2024-09-17

## 2024-09-18 DIAGNOSIS — I73.9 PERIPHERAL VASCULAR DISEASE, UNSPECIFIED: ICD-10-CM

## 2024-09-18 DIAGNOSIS — S06.9XAA UNSPECIFIED INTRACRANIAL INJURY WITH LOSS OF CONSCIOUSNESS STATUS UNKNOWN, INITIAL ENCOUNTER: ICD-10-CM

## 2024-09-18 DIAGNOSIS — M20.42 OTHER HAMMER TOE(S) (ACQUIRED), LEFT FOOT: ICD-10-CM

## 2024-09-18 DIAGNOSIS — B35.1 TINEA UNGUIUM: ICD-10-CM

## 2024-10-03 ENCOUNTER — APPOINTMENT (OUTPATIENT)
Age: 59
End: 2024-10-03

## 2024-10-30 ENCOUNTER — APPOINTMENT (OUTPATIENT)
Dept: ORTHOPEDIC SURGERY | Facility: CLINIC | Age: 59
End: 2024-10-30
Payer: MEDICARE

## 2024-10-30 VITALS
BODY MASS INDEX: 29.8 KG/M2 | DIASTOLIC BLOOD PRESSURE: 79 MMHG | WEIGHT: 220 LBS | HEART RATE: 85 BPM | SYSTOLIC BLOOD PRESSURE: 112 MMHG | HEIGHT: 72 IN

## 2024-10-30 DIAGNOSIS — S72.492A OTHER FRACTURE OF LOWER END OF LEFT FEMUR, INITIAL ENCOUNTER FOR CLOSED FRACTURE: ICD-10-CM

## 2024-10-30 PROCEDURE — 99214 OFFICE O/P EST MOD 30 MIN: CPT | Mod: 57

## 2024-10-30 PROCEDURE — 73562 X-RAY EXAM OF KNEE 3: CPT | Mod: LT

## 2024-10-30 PROCEDURE — 27501 TREATMENT OF THIGH FRACTURE: CPT | Mod: LT

## 2024-11-12 NOTE — ED ADULT NURSE NOTE - NS ED PATIENT SAFETY CONCERN
NAME: Simón Acevedo     ID:[id number]     :1954    Location: PO[facility]    Procedure: Cerivcal Epidural Steroid Injection Under Fluoroscopic Imaging    Pre-op Diagnosis: 1. Cervical Degenerative Disc Disease. 2. Cervical Radicular Pain    Post-op Diagnosis: Same    Anesthesia: Local only     Complications: None    After confirming written and informed consent and discussing the risk, benefits and alternatives for the procedure, the patient had the correct site marked by the physician performing the procedure. The specific risks of bleeding and infection were discussed. The patient was taken to the fluoroscopy suite.    The patient was then placed in the prone position. A pulse oximeter was placed, and verbal and visual monitoring were maintained throughout the procedure. The skin overlying the cervical and thoracic spine was then prepped with chlorhexidine gluconate and a sterile drape was aced. Appropriate sterile attire was worn,  including sterile gloves. A time out was then performed involving the physician, radiation technologist and the patient.    Fluoroscopy was then used to identify the anatomy of the cervical and thoracic spine. The skin overlying the C5/6 interspace was then anesthetized with 3 ml of 1% lidocaine using a 25G 1.5 inch needle. Next, an 20 G 9 cm Tuohy needle was then advanced through the skin, underlying subcutaneous fat and into the supraspinous ligament using intermittent fluoroscopy. After contacting ligament, a contralateral oblique view of the interspace was obtained with fluoroscopy to determine the depth of the Tuohy needle. Proper medial-lateral location of the needle was obtained using anterior-posterior fluoroscopic views. A loss of resistance to air technique was then used to traverse the ligamentum flavum into the epidural space. 2 ml of Omnipaque-240 was then used to confirm the location of the needle tip in the epidural space, excluding vascular or intrathecal placement of  No

## 2024-11-27 ENCOUNTER — APPOINTMENT (OUTPATIENT)
Dept: ORTHOPEDIC SURGERY | Facility: CLINIC | Age: 59
End: 2024-11-27

## 2024-12-11 ENCOUNTER — APPOINTMENT (OUTPATIENT)
Dept: ORTHOPEDIC SURGERY | Facility: CLINIC | Age: 59
End: 2024-12-11

## 2024-12-18 ENCOUNTER — APPOINTMENT (OUTPATIENT)
Dept: ORTHOPEDIC SURGERY | Facility: CLINIC | Age: 59
End: 2024-12-18

## 2024-12-30 ENCOUNTER — APPOINTMENT (OUTPATIENT)
Dept: ORTHOPEDIC SURGERY | Facility: CLINIC | Age: 59
End: 2024-12-30
Payer: MEDICARE

## 2024-12-30 DIAGNOSIS — M81.8 OTHER OSTEOPOROSIS W/OUT CURRENT PATHOLOGICAL FRACTURE: ICD-10-CM

## 2024-12-30 DIAGNOSIS — S72.492D OTHER FRACTURE OF LOWER END OF LEFT FEMUR, SUBSEQUENT ENCOUNTER FOR CLOSED FRACTURE WITH ROUTINE HEALING: ICD-10-CM
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
## 2024-12-30 PROCEDURE — 99442: CPT

## 2024-12-30 PROCEDURE — ZZZZZ: CPT

## 2025-01-10 PROBLEM — S72.492D OTHER CLOSED FRACTURE OF DISTAL END OF LEFT FEMUR WITH ROUTINE HEALING, SUBSEQUENT ENCOUNTER: Status: ACTIVE | Noted: 2025-01-10

## 2025-03-12 ENCOUNTER — TRANSCRIPTION ENCOUNTER (OUTPATIENT)
Age: 60
End: 2025-03-12

## 2025-05-13 ENCOUNTER — APPOINTMENT (OUTPATIENT)
Dept: ORTHOPEDIC SURGERY | Facility: CLINIC | Age: 60
End: 2025-05-13
Payer: MEDICARE

## 2025-05-13 VITALS — WEIGHT: 220 LBS | BODY MASS INDEX: 29.84 KG/M2

## 2025-05-13 DIAGNOSIS — M23.92 UNSPECIFIED INTERNAL DERANGEMENT OF LEFT KNEE: ICD-10-CM

## 2025-05-13 DIAGNOSIS — M79.18 MYALGIA, OTHER SITE: ICD-10-CM

## 2025-05-13 DIAGNOSIS — M25.562 PAIN IN LEFT KNEE: ICD-10-CM

## 2025-05-13 DIAGNOSIS — G89.29 PAIN IN LEFT KNEE: ICD-10-CM

## 2025-05-13 DIAGNOSIS — S89.92XA UNSPECIFIED INJURY OF LEFT LOWER LEG, INITIAL ENCOUNTER: ICD-10-CM

## 2025-05-13 PROCEDURE — 73564 X-RAY EXAM KNEE 4 OR MORE: CPT | Mod: LT

## 2025-05-13 PROCEDURE — 99204 OFFICE O/P NEW MOD 45 MIN: CPT

## 2025-05-21 ENCOUNTER — APPOINTMENT (OUTPATIENT)
Dept: ORTHOPEDIC SURGERY | Facility: CLINIC | Age: 60
End: 2025-05-21
Payer: MEDICARE

## 2025-05-21 VITALS
HEART RATE: 99 BPM | WEIGHT: 222 LBS | DIASTOLIC BLOOD PRESSURE: 81 MMHG | SYSTOLIC BLOOD PRESSURE: 149 MMHG | HEIGHT: 72 IN | BODY MASS INDEX: 30.07 KG/M2

## 2025-05-21 DIAGNOSIS — M81.8 OTHER OSTEOPOROSIS W/OUT CURRENT PATHOLOGICAL FRACTURE: ICD-10-CM

## 2025-05-21 PROCEDURE — G2211 COMPLEX E/M VISIT ADD ON: CPT

## 2025-05-21 PROCEDURE — 99214 OFFICE O/P EST MOD 30 MIN: CPT

## 2025-05-23 ENCOUNTER — EMERGENCY (EMERGENCY)
Facility: HOSPITAL | Age: 60
LOS: 1 days | End: 2025-05-23
Attending: STUDENT IN AN ORGANIZED HEALTH CARE EDUCATION/TRAINING PROGRAM
Payer: MEDICARE

## 2025-05-23 VITALS
TEMPERATURE: 99 F | RESPIRATION RATE: 20 BRPM | SYSTOLIC BLOOD PRESSURE: 120 MMHG | DIASTOLIC BLOOD PRESSURE: 72 MMHG | HEART RATE: 110 BPM | OXYGEN SATURATION: 93 %

## 2025-05-23 VITALS
SYSTOLIC BLOOD PRESSURE: 131 MMHG | DIASTOLIC BLOOD PRESSURE: 86 MMHG | TEMPERATURE: 98 F | HEART RATE: 86 BPM | RESPIRATION RATE: 18 BRPM | OXYGEN SATURATION: 95 % | WEIGHT: 222.01 LBS

## 2025-05-23 DIAGNOSIS — Z98.890 OTHER SPECIFIED POSTPROCEDURAL STATES: Chronic | ICD-10-CM

## 2025-05-23 DIAGNOSIS — S06.9X9A UNSPECIFIED INTRACRANIAL INJURY WITH LOSS OF CONSCIOUSNESS OF UNSPECIFIED DURATION, INITIAL ENCOUNTER: Chronic | ICD-10-CM

## 2025-05-23 DIAGNOSIS — Z89.022 ACQUIRED ABSENCE OF LEFT FINGER(S): Chronic | ICD-10-CM

## 2025-05-23 PROCEDURE — 99283 EMERGENCY DEPT VISIT LOW MDM: CPT

## 2025-05-23 PROCEDURE — 99284 EMERGENCY DEPT VISIT MOD MDM: CPT

## 2025-05-23 NOTE — ED ADULT NURSE REASSESSMENT NOTE - NS ED NURSE REASSESS COMMENT FT1
pt upset he has to go back to the same facility, explained the administration will work on getting him to another facility, pt agrees  ambo here to transfer pt

## 2025-05-23 NOTE — ED PROVIDER NOTE - PROGRESS NOTE DETAILS
patient seen by AMOR. Accepted back to [facility]. will set up transportation back  Samer Slim Mckoy MD
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
## 2025-05-23 NOTE — ED PROVIDER NOTE - CLINICAL SUMMARY MEDICAL DECISION MAKING FREE TEXT BOX
On arrival HD stable, afebrile and well appearing. Patient answering questions appropriately. Calm and cooperative in the ED. Will consult SW for further eval. Hold off on labs pending further recs.
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
## 2025-05-23 NOTE — CHART NOTE - NSCHARTNOTEFT_GEN_A_CORE
SW Note: Notified by ED provider that pts NH voiced concerns about pt returning. Per MD pt is medically cleared and does not require any medical interventions. Called [facility] [phone], unable to locate any staff available to answer phone call. Msg left for RN office. Reached out to  Director Promise Guzman who called [facility] and stated pt can return and that  can set up transport. Promise Guzman stated she spoke ovidio Dyson from the [facility]. [facility] and [facility] letter provided. Notified Dr. Husain of d/c back to NH None

## 2025-05-23 NOTE — ED PROVIDER NOTE - ATTENDING CONTRIBUTION TO CARE
I have personally performed a history and physical examination of the patient and discussed management with the resident as well as the patient.  I reviewed the resident's note and agree with the documented findings and plan of care.  I have authored and modified critical sections of the Provider Note, including but not limited to HPI, Physical Exam and MDM.    59 year old male w/PMHx paraplegia and TBI presents to the ED for eval. [facility] Came to the ED for new placement.  Patient asymptomatic at this time.  Will discuss case with social work for disposition planning.

## 2025-05-23 NOTE — ED PROVIDER NOTE - NSFOLLOWUPINSTRUCTIONS_ED_ALL_ED_FT
- Please follow-up with your primary care doctor in the next 1-2 days.  Please call tomorrow for an appointment.  If you cannot follow-up with your primary care doctor please return to the ED for any urgent issues.  - If you have any worsening of symptoms or any other concerns please return to the ED immediately.  - Please continue taking your home medications as directed.

## 2025-05-23 NOTE — ED ADULT NURSE NOTE - CAS EDN DISCHARGE ASSESSMENT
Alert and oriented to person, place and time
I have personally seen and examined this patient. I have fully participated in the care of this patient. I have reviewed all pertinent clinical information, including history physical exam, plan and the Resident's note and agree except as noted
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
## 2025-05-23 NOTE — ED PROVIDER NOTE - PATIENT PORTAL LINK FT
You can access the FollowMyHealth Patient Portal offered by [facility] by registering at the following website: [email or web address]. By joining [employer]’s FollowMyHealth portal, you will also be able to view your health information using other applications (apps) compatible with our system.

## 2025-05-28 DIAGNOSIS — Z87.820 PERSONAL HISTORY OF TRAUMATIC BRAIN INJURY: ICD-10-CM

## 2025-05-28 DIAGNOSIS — R45.1 RESTLESSNESS AND AGITATION: ICD-10-CM

## 2025-05-28 DIAGNOSIS — G82.20 PARAPLEGIA, UNSPECIFIED: ICD-10-CM

## 2025-05-30 ENCOUNTER — APPOINTMENT (OUTPATIENT)
Dept: ORTHOPEDIC SURGERY | Facility: CLINIC | Age: 60
End: 2025-05-30

## 2025-06-03 ENCOUNTER — APPOINTMENT (OUTPATIENT)
Dept: ORTHOPEDIC SURGERY | Facility: CLINIC | Age: 60
End: 2025-06-03

## 2025-06-06 ENCOUNTER — APPOINTMENT (OUTPATIENT)
Dept: ORTHOPEDIC SURGERY | Facility: CLINIC | Age: 60
End: 2025-06-06

## 2025-06-24 ENCOUNTER — APPOINTMENT (OUTPATIENT)
Dept: ORTHOPEDIC SURGERY | Facility: CLINIC | Age: 60
End: 2025-06-24
Payer: MEDICARE

## 2025-06-24 PROCEDURE — 99214 OFFICE O/P EST MOD 30 MIN: CPT

## 2025-07-08 ENCOUNTER — APPOINTMENT (OUTPATIENT)
Dept: ORTHOPEDIC SURGERY | Facility: CLINIC | Age: 60
End: 2025-07-08
Payer: MEDICARE

## 2025-07-08 VITALS
HEIGHT: 72 IN | SYSTOLIC BLOOD PRESSURE: 113 MMHG | WEIGHT: 222 LBS | HEART RATE: 80 BPM | DIASTOLIC BLOOD PRESSURE: 81 MMHG | BODY MASS INDEX: 30.07 KG/M2

## 2025-07-08 PROCEDURE — 99215 OFFICE O/P EST HI 40 MIN: CPT

## 2025-07-18 ENCOUNTER — APPOINTMENT (OUTPATIENT)
Dept: ORTHOPEDIC SURGERY | Facility: CLINIC | Age: 60
End: 2025-07-18
Payer: MEDICARE

## 2025-07-18 PROBLEM — M18.0 PRIMARY OSTEOARTHRITIS OF BOTH FIRST CARPOMETACARPAL JOINTS: Status: ACTIVE | Noted: 2025-07-18

## 2025-07-18 PROBLEM — M18.0 PRIMARY OSTEOARTHRITIS OF BOTH FIRST CARPOMETACARPAL JOINTS: Status: RESOLVED | Noted: 2025-07-18 | Resolved: 2025-07-18

## 2025-07-18 PROBLEM — M19.049 CMC ARTHRITIS: Status: RESOLVED | Noted: 2021-09-16 | Resolved: 2025-07-18

## 2025-07-18 PROBLEM — M19.049 CMC ARTHRITIS: Status: ACTIVE | Noted: 2025-07-18

## 2025-07-18 PROCEDURE — 99203 OFFICE O/P NEW LOW 30 MIN: CPT

## 2025-07-18 PROCEDURE — 73130 X-RAY EXAM OF HAND: CPT | Mod: 50

## 2025-07-22 ENCOUNTER — APPOINTMENT (OUTPATIENT)
Dept: ORTHOPEDIC SURGERY | Facility: CLINIC | Age: 60
End: 2025-07-22

## 2025-07-28 ENCOUNTER — APPOINTMENT (OUTPATIENT)
Dept: ORTHOPEDIC SURGERY | Facility: CLINIC | Age: 60
End: 2025-07-28

## 2025-07-30 ENCOUNTER — APPOINTMENT (OUTPATIENT)
Dept: ORTHOPEDIC SURGERY | Facility: CLINIC | Age: 60
End: 2025-07-30

## 2025-09-19 ENCOUNTER — APPOINTMENT (OUTPATIENT)
Dept: ORTHOPEDIC SURGERY | Facility: CLINIC | Age: 60
End: 2025-09-19
Payer: MEDICARE

## 2025-09-19 VITALS — WEIGHT: 222 LBS | BODY MASS INDEX: 30.07 KG/M2 | HEIGHT: 72 IN

## 2025-09-19 DIAGNOSIS — M18.0 BILATERAL PRIMARY OSTEOARTHRITIS OF FIRST CARPOMETACARPAL JOINTS: ICD-10-CM

## 2025-09-19 PROCEDURE — 20605 DRAIN/INJ JOINT/BURSA W/O US: CPT | Mod: 50

## 2025-09-19 PROCEDURE — 99213 OFFICE O/P EST LOW 20 MIN: CPT | Mod: 25

## (undated) DEVICE — GLV 8.5 PROTEXIS (WHITE)

## (undated) DEVICE — DRAPE MAYO STAND 30"

## (undated) DEVICE — NDL SPINAL 18G X 3.5" (PINK)

## (undated) DEVICE — MEDICATION LABELS W MARKER

## (undated) DEVICE — WARMING BLANKET UPPER ADULT

## (undated) DEVICE — DRAPE 3/4 SHEET W REINFORCEMENT 56X77"

## (undated) DEVICE — DRAPE MINOR PROCEDURE

## (undated) DEVICE — GLV 8 PROTEXIS (WHITE)

## (undated) DEVICE — POSITIONER FOAM EGG CRATE ULNAR 2PCS (PINK)

## (undated) DEVICE — SPECIMEN CONTAINER 100ML

## (undated) DEVICE — GLV 6.5 PROTEXIS (WHITE)

## (undated) DEVICE — WARMING BLANKET LOWER ADULT

## (undated) DEVICE — MARKING PEN W RULER

## (undated) DEVICE — GLV 7 PROTEXIS (WHITE)

## (undated) DEVICE — DRAPE TOWEL BLUE 17" X 24"

## (undated) DEVICE — GOWN TRIMAX LG

## (undated) DEVICE — GLV 7.5 PROTEXIS (WHITE)

## (undated) DEVICE — VENODYNE/SCD SLEEVE CALF LARGE

## (undated) DEVICE — SOL IRR POUR H2O 250ML

## (undated) DEVICE — SOL IRR POUR NS 0.9% 500ML

## (undated) DEVICE — PREP CHLORAPREP HI-LITE ORANGE 26ML

## (undated) DEVICE — MEDTRONIC CATHETER PASSER 38CM